# Patient Record
Sex: FEMALE | Race: WHITE | NOT HISPANIC OR LATINO | Employment: FULL TIME | URBAN - METROPOLITAN AREA
[De-identification: names, ages, dates, MRNs, and addresses within clinical notes are randomized per-mention and may not be internally consistent; named-entity substitution may affect disease eponyms.]

---

## 2017-06-05 ENCOUNTER — OFFICE VISIT (OUTPATIENT)
Dept: OBSTETRICS AND GYNECOLOGY | Facility: CLINIC | Age: 37
End: 2017-06-05
Attending: OBSTETRICS & GYNECOLOGY
Payer: COMMERCIAL

## 2017-06-05 VITALS
WEIGHT: 149.5 LBS | DIASTOLIC BLOOD PRESSURE: 70 MMHG | BODY MASS INDEX: 25.52 KG/M2 | HEIGHT: 64 IN | SYSTOLIC BLOOD PRESSURE: 98 MMHG

## 2017-06-05 DIAGNOSIS — N94.10 DYSPAREUNIA DUE TO NON-PSYCHOGENIC CAUSE IN FEMALE: Primary | ICD-10-CM

## 2017-06-05 PROCEDURE — 99999 PR PBB SHADOW E&M-EST. PATIENT-LVL II: CPT | Mod: PBBFAC,,, | Performed by: OBSTETRICS & GYNECOLOGY

## 2017-06-05 PROCEDURE — 99204 OFFICE O/P NEW MOD 45 MIN: CPT | Mod: S$GLB,,, | Performed by: OBSTETRICS & GYNECOLOGY

## 2017-06-05 RX ORDER — AMOXICILLIN AND CLAVULANATE POTASSIUM 875; 125 MG/1; MG/1
TABLET, FILM COATED ORAL
Refills: 0 | COMMUNITY
Start: 2017-05-31 | End: 2017-06-15

## 2017-06-05 RX ORDER — DEXTROAMPHETAMINE SACCHARATE, AMPHETAMINE ASPARTATE, DEXTROAMPHETAMINE SULFATE AND AMPHETAMINE SULFATE 7.5; 7.5; 7.5; 7.5 MG/1; MG/1; MG/1; MG/1
30 TABLET ORAL
COMMUNITY
Start: 2016-09-16 | End: 2018-08-30

## 2017-06-05 NOTE — PROGRESS NOTES
Subjective:       Patient ID: Cynthia Dailey is a 36 y.o. female.    Chief Complaint:  Pelvic Pain (hurts to wear tampons, sexual disfuction)      History of Present Illness  HPI  Pt is 36 y.o. with Patient's last menstrual period was 2017. here for further evaluation of her pain.  Sent by Stefanie Cobb.  [place] [facility] [place] reviewed.  Since the surgery, she has had terrible pain with intercourse.  Can't even wear a tampon without pain.  And finally, cannot achieve an orgasm.  It is easier for her to urinate sitting upright.    Pt can insert the tampon into the vagina.  But when she does, it pushes up on her bladder to where she can't urinate.    Does also have vaginal dryness.  Does not use lubricant.    Previously, was able to have an orgasm.  She has tried to mastrubate but could not achieve an orgasm.  No arousal either.    GYN & OB History  Patient's last menstrual period was 2017.   Date of Last Pap: No result found    OB History    Para Term  AB Living   0 0 0 0 0 0   SAB TAB Ectopic Multiple Live Births   0 0 0 0 0             Review of Systems  Review of Systems   Constitutional: Negative for activity change, appetite change and fatigue.   HENT: Negative.  Negative for tinnitus.    Eyes: Negative.    Respiratory: Negative for cough and shortness of breath.    Cardiovascular: Negative for chest pain and palpitations.   Gastrointestinal: Negative.  Negative for abdominal pain, blood in stool, constipation, diarrhea and nausea.   Endocrine: Negative.  Negative for hot flashes.   Genitourinary: Positive for dyspareunia. Negative for dysuria, frequency, menstrual problem, pelvic pain, vaginal discharge, dysmenorrhea, urinary incontinence and postcoital bleeding.   Musculoskeletal: Negative for back pain and joint swelling.   Skin:  Negative for rash.   Neurological: Negative.  Negative for headaches.   Hematological: Negative.  Does not bruise/bleed easily.    Psychiatric/Behavioral: The patient is not nervous/anxious.    Breast: negative.  Negative for breast mass, nipple discharge and skin changes          Objective:    Physical Exam:   Constitutional: She is oriented to person, place, and time. She appears well-developed and well-nourished. No distress.    HENT:   Head: Normocephalic and atraumatic.    Eyes: Conjunctivae and lids are normal. Pupils are equal, round, and reactive to light.    Neck: Normal range of motion and full passive range of motion without pain. Neck supple.    Cardiovascular: Normal rate and regular rhythm.  Exam reveals no edema.     Pulmonary/Chest: Effort normal and breath sounds normal. She has no wheezes.        Abdominal: Soft. Normal appearance and bowel sounds are normal. She exhibits no distension. There is no tenderness. There is no rebound and no guarding.     Genitourinary: Uterus normal. Pelvic exam was performed with patient supine. There is no tenderness or lesion on the right labia. There is no tenderness or lesion on the left labia. Uterus is not deviated, not fixed and not hosting fibroids. Cervix is normal. Right adnexum displays no mass and no tenderness. Left adnexum displays no mass and no tenderness. There is tenderness in the vagina. No rectocele, cystocele or unspecified prolapse of vaginal walls in the vagina. No vaginal discharge found. Labial bartholins normal.Cervix exhibits no motion tenderness and no discharge.   Genitourinary Comments: Tenderness of left ischial spine.  Loss of left side anal wink.  Normal rectal tone.  Vaginal shelf beyond the hymenal ring.           Musculoskeletal: Normal range of motion and moves all extremeties.       Neurological: She is alert and oriented to person, place, and time. She has normal strength.    Skin: Skin is warm and dry.    Psychiatric: She has a normal mood and affect. Her speech is normal and behavior is normal. Thought content normal. Her mood appears not anxious. She  does not exhibit a depressed mood. She expresses no suicidal plans and no homicidal plans.          Assessment:        1. Dyspareunia due to non-psychogenic cause in female                Plan:      Cynthia was seen today for pelvic pain.    Diagnoses and all orders for this visit:    Dyspareunia due to non-psychogenic cause in female  -     LIDOCAINE 2 %, VALIUM 5 MG, BACLOFEN 4 % SUPPOSITORY; Place 1 suppository vaginally 2 (two) times daily.  We had a long discussion about the source of her discomfort with intercourse.  Likely due to the shelf posteriorly (after the rectopexy).  Also has some pudenal neuralgia.  She has tried pelvic floor PT without much help.  We discussed how to use vaginal dilators to help her stretch and desensitize her pelvis.  Explained how she can engage her partner to help.  Also given some vaginal valium.  F/u 3 months for a pain re-assessment.

## 2017-06-15 ENCOUNTER — OFFICE VISIT (OUTPATIENT)
Dept: FAMILY MEDICINE | Facility: CLINIC | Age: 37
End: 2017-06-15
Payer: COMMERCIAL

## 2017-06-15 VITALS
HEIGHT: 64 IN | SYSTOLIC BLOOD PRESSURE: 98 MMHG | BODY MASS INDEX: 25.14 KG/M2 | DIASTOLIC BLOOD PRESSURE: 58 MMHG | WEIGHT: 147.25 LBS | TEMPERATURE: 98 F

## 2017-06-15 DIAGNOSIS — K59.01 CONSTIPATION BY DELAYED COLONIC TRANSIT: ICD-10-CM

## 2017-06-15 DIAGNOSIS — R10.9 ABDOMINAL CRAMPS: ICD-10-CM

## 2017-06-15 DIAGNOSIS — Q43.8 REDUNDANT COLON: Primary | ICD-10-CM

## 2017-06-15 PROCEDURE — 99999 PR PBB SHADOW E&M-EST. PATIENT-LVL III: CPT | Mod: PBBFAC,,, | Performed by: FAMILY MEDICINE

## 2017-06-15 PROCEDURE — 99214 OFFICE O/P EST MOD 30 MIN: CPT | Mod: S$GLB,,, | Performed by: FAMILY MEDICINE

## 2017-06-15 RX ORDER — BACLOFEN 10 MG/1
10 TABLET ORAL DAILY
COMMUNITY
End: 2017-09-18

## 2017-06-15 RX ORDER — DICYCLOMINE HYDROCHLORIDE 10 MG/1
10 CAPSULE ORAL 3 TIMES DAILY PRN
Qty: 40 CAPSULE | Refills: 0 | Status: SHIPPED | OUTPATIENT
Start: 2017-06-15 | End: 2017-07-15

## 2017-06-15 NOTE — PROGRESS NOTES
Subjective:       Patient ID: Cynthia Dailey is a 36 y.o. female.    Chief Complaint: Abdominal Pain (Abd pain, varies each side, x 1 wk. )    She is complaining of moderate to severe abdominal cramping pain for the past several days.  Some of the pain is in the right lower quadrant but some is in the left middle and left lower quadrant.  She has a complicated history.  She has a redundant colon and had rectal surgery for prolapsed rectum.  She feels that she is had increased problems with her bowels since the surgery.  She had severe constipation with bloating and distention.  She tried multiple laxatives including Dulcolax and MiraLAX and magnesium citrate.  There was initially not much result but then she had copious bowel movements starting last week.  She has not had hardly any bowel movement since Sunday but has had decreased appetite.  There has been no fever or weight loss.  She has also been having pelvic and vaginal pain and dyspareunia and had an evaluation 10 days ago.  She is seeing Dahiana Boudoin and for depression.  She had a recent ketamine infusion with good result.      Abdominal Pain   This is a recurrent problem. The current episode started 1 to 4 weeks ago. The onset quality is undetermined. The problem occurs constantly. The problem has been waxing and waning. The pain is located in the LLQ, RLQ, right flank and rectum. The pain is at a severity of 9/10. The pain is severe. The quality of the pain is aching, cramping, sharp and tearing. Associated symptoms include anorexia, constipation, myalgias and nausea. Pertinent negatives include no arthralgias, belching, diarrhea, dysuria, fever, flatus, frequency, headaches, hematochezia, hematuria, melena, vomiting or weight loss. The pain is aggravated by being still and certain positions. The pain is relieved by nothing. Her past medical history is significant for abdominal surgery, gallstones, GERD and pancreatitis. There is no history of colon cancer,  "Crohn's disease, irritable bowel syndrome, PUD or ulcerative colitis. Patient's medical history includes UTI. Patient's medical history does not include kidney stones.     Review of Systems   Constitutional: Positive for appetite change. Negative for fever, unexpected weight change and weight loss.   Gastrointestinal: Positive for abdominal pain, anorexia, constipation and nausea. Negative for blood in stool, diarrhea, flatus, hematochezia, melena and vomiting.   Genitourinary: Positive for pelvic pain and vaginal pain. Negative for dysuria, frequency and hematuria.   Musculoskeletal: Positive for myalgias. Negative for arthralgias.   Neurological: Negative for headaches.       Objective:     Blood pressure (!) 98/58, temperature 98.3 °F (36.8 °C), temperature source Oral, height 5' 4" (1.626 m), weight 66.8 kg (147 lb 4.3 oz), last menstrual period 05/30/2017.      Physical Exam   Constitutional: She appears well-developed and well-nourished. She appears distressed.   She is unable to sit up straight because of her abdominal pain   Cardiovascular: Normal rate and regular rhythm.    Pulmonary/Chest: Effort normal and breath sounds normal. No respiratory distress.   Abdominal: Soft.   Active bowel sounds.  She has some epigastric tenderness but mostly right lower quadrant and left lower quadrant tenderness.  There is no distention or mass.  Hollow sounds to percussion.  No CVA tenderness   Neurological: She is alert.   Psychiatric:   Tearful at times       Assessment:       1. Redundant colon    2. Constipation by delayed colonic transit    3. Abdominal cramps        Plan:       Abdominal CT scan without contrast.  She has a history of anaphylaxis due to IV contrast and there is some mention of a problem with oral contrast.  Bentyl 10 mg 3 times a day when necessary abdominal cramps.  Colon and rectal consult with Dr. Hernandes.  Call me back if she is having trouble controlling her symptoms.     "

## 2017-06-20 ENCOUNTER — HOSPITAL ENCOUNTER (OUTPATIENT)
Dept: RADIOLOGY | Facility: HOSPITAL | Age: 37
Discharge: HOME | End: 2017-06-20
Attending: FAMILY MEDICINE
Payer: COMMERCIAL

## 2017-06-20 ENCOUNTER — TELEPHONE (OUTPATIENT)
Dept: FAMILY MEDICINE | Facility: CLINIC | Age: 37
End: 2017-06-20

## 2017-06-20 DIAGNOSIS — Q43.8 REDUNDANT COLON: ICD-10-CM

## 2017-06-20 PROCEDURE — 74176 CT ABD & PELVIS W/O CONTRAST: CPT | Mod: TC,PO

## 2017-06-20 PROCEDURE — 74176 CT ABD & PELVIS W/O CONTRAST: CPT | Mod: 26,,, | Performed by: RADIOLOGY

## 2017-06-20 NOTE — TELEPHONE ENCOUNTER
----- Message from Drake Phelps sent at 6/20/2017  2:41 PM CDT -----  Contact: self   Placed call to pod, patient want to speak with a nurse regarding test results please call back at [phone]

## 2017-06-20 NOTE — TELEPHONE ENCOUNTER
Pt read results on [facility] and called to schedule an appointment to discuss further. Pt appt scheduled

## 2017-06-21 ENCOUNTER — OFFICE VISIT (OUTPATIENT)
Dept: FAMILY MEDICINE | Facility: CLINIC | Age: 37
End: 2017-06-21
Payer: COMMERCIAL

## 2017-06-21 VITALS
DIASTOLIC BLOOD PRESSURE: 75 MMHG | OXYGEN SATURATION: 99 % | BODY MASS INDEX: 25.2 KG/M2 | SYSTOLIC BLOOD PRESSURE: 118 MMHG | TEMPERATURE: 98 F | HEIGHT: 64 IN | WEIGHT: 147.63 LBS | HEART RATE: 57 BPM

## 2017-06-21 DIAGNOSIS — M54.50 RIGHT-SIDED LOW BACK PAIN WITHOUT SCIATICA, UNSPECIFIED CHRONICITY: ICD-10-CM

## 2017-06-21 DIAGNOSIS — M87.052 AVASCULAR NECROSIS OF BONES OF BOTH HIPS: ICD-10-CM

## 2017-06-21 DIAGNOSIS — K59.00 CONSTIPATION, UNSPECIFIED CONSTIPATION TYPE: Primary | ICD-10-CM

## 2017-06-21 DIAGNOSIS — M87.051 AVASCULAR NECROSIS OF BONES OF BOTH HIPS: ICD-10-CM

## 2017-06-21 PROCEDURE — 99999 PR PBB SHADOW E&M-EST. PATIENT-LVL III: CPT | Mod: PBBFAC,,, | Performed by: FAMILY MEDICINE

## 2017-06-21 PROCEDURE — 99214 OFFICE O/P EST MOD 30 MIN: CPT | Mod: S$GLB,,, | Performed by: FAMILY MEDICINE

## 2017-06-21 RX ORDER — NAPROXEN 500 MG/1
500 TABLET ORAL 2 TIMES DAILY PRN
Qty: 40 TABLET | Refills: 2 | Status: SHIPPED | OUTPATIENT
Start: 2017-06-21 | End: 2017-07-21

## 2017-06-21 RX ORDER — POLYETHYLENE GLYCOL 3350 17 G/17G
25 POWDER, FOR SOLUTION ORAL DAILY
Qty: 1530 G | Refills: 5 | Status: SHIPPED | OUTPATIENT
Start: 2017-06-21 | End: 2018-08-30

## 2017-06-21 NOTE — PROGRESS NOTES
"Subjective:       Patient ID: Cynthia Dailey is a 36 y.o. female.    Chief Complaint: CT scan Results    She is here for follow-up of severe constipation from a redundant colon.  She was having significant abdominal cramps last week.  They have improved with the Bentyl.  She is having a bowel movement every day to every other day.  They are formed.  Her abdominal CT scan does show persistent large amount of stool as of yesterday.  She also complains of right iliac crest pain with right sacral pain and some left lower quadrant pain.  The CT scan did show bilateral avascular necrosis of both femoral necks.  She relates previous marathon running and had to stop because of hip pain.  She does relate the back and pelvic pain worse with certain positions and movements.      Review of Systems   Constitutional: Negative for fever and unexpected weight change.   Respiratory: Negative for shortness of breath and stridor.    Gastrointestinal: Positive for abdominal pain and constipation. Negative for diarrhea and nausea.       Objective:     Blood pressure 118/75, pulse (!) 57, temperature 97.9 °F (36.6 °C), temperature source Oral, height 5' 4" (1.626 m), weight 67 kg (147 lb 9.6 oz), last menstrual period 05/30/2017, SpO2 99 %.      Physical Exam   Constitutional: She appears well-developed and well-nourished. No distress (significantly more comfortable than last week.).   Pulmonary/Chest: Effort normal and breath sounds normal.   Abdominal: Soft. Bowel sounds are normal. She exhibits no distension and no mass. There is no tenderness.   Musculoskeletal:   She can flex her lumbar spine to 100°.  Mild pain.  More pain with left side bend.  She does have mild lumbosacral tenderness.  Hip range of motion is very good but she does have pain with full abduction.  There is also some pain with internal rotation.  She is tender over the right iliac crest.   Neurological: She is alert.   Psychiatric: She has a normal mood and affect.    "    Assessment:       1. Constipation, unspecified constipation type    2. Avascular necrosis of bones of both hips    3. Right-sided low back pain without sciatica, unspecified chronicity        Plan:       Naprosyn for musculoskeletal pain.  Increase MiraLAX to 1.5 Daily.  She does have a consultation appointment with Dr. Hernandes.  I will also have her consult with orthopedics.

## 2017-06-26 ENCOUNTER — TELEPHONE (OUTPATIENT)
Dept: ORTHOPEDICS | Facility: CLINIC | Age: 37
End: 2017-06-26

## 2017-06-26 NOTE — TELEPHONE ENCOUNTER
----- Message from Pritesh Cuadra MD sent at 6/25/2017  8:22 PM CDT -----  This patient needs to see Dr. Thompson. Not me.

## 2017-06-27 DIAGNOSIS — M25.551 BILATERAL HIP PAIN: Primary | ICD-10-CM

## 2017-06-27 DIAGNOSIS — M25.552 BILATERAL HIP PAIN: Primary | ICD-10-CM

## 2017-06-27 RX ORDER — OMEPRAZOLE 40 MG/1
CAPSULE, DELAYED RELEASE ORAL
Qty: 90 CAPSULE | Refills: 3 | Status: SHIPPED | OUTPATIENT
Start: 2017-06-27 | End: 2018-02-22 | Stop reason: SDUPTHER

## 2017-06-29 ENCOUNTER — OFFICE VISIT (OUTPATIENT)
Dept: ORTHOPEDICS | Facility: CLINIC | Age: 37
End: 2017-06-29
Payer: COMMERCIAL

## 2017-06-29 ENCOUNTER — HOSPITAL ENCOUNTER (OUTPATIENT)
Dept: RADIOLOGY | Facility: HOSPITAL | Age: 37
Discharge: HOME | End: 2017-06-29
Attending: ORTHOPAEDIC SURGERY
Payer: COMMERCIAL

## 2017-06-29 VITALS — BODY MASS INDEX: 25.1 KG/M2 | HEIGHT: 64 IN | WEIGHT: 147 LBS

## 2017-06-29 DIAGNOSIS — M25.552 BILATERAL HIP PAIN: ICD-10-CM

## 2017-06-29 DIAGNOSIS — M25.551 BILATERAL HIP PAIN: ICD-10-CM

## 2017-06-29 DIAGNOSIS — M87.052 AVASCULAR NECROSIS OF BONES OF BOTH HIPS: Primary | ICD-10-CM

## 2017-06-29 DIAGNOSIS — M87.051 AVASCULAR NECROSIS OF BONES OF BOTH HIPS: Primary | ICD-10-CM

## 2017-06-29 PROCEDURE — 99999 PR PBB SHADOW E&M-EST. PATIENT-LVL II: CPT | Mod: PBBFAC,,, | Performed by: ORTHOPAEDIC SURGERY

## 2017-06-29 PROCEDURE — 99243 OFF/OP CNSLTJ NEW/EST LOW 30: CPT | Mod: S$GLB,,, | Performed by: ORTHOPAEDIC SURGERY

## 2017-06-29 NOTE — LETTER
June 29, 2017      Praveen Saldana MD  [address]  [facility] [address]           [facility] [address] [facility] [address] [place] [address]  Phone: [phone]          Patient: Cynthia Dailey   MR Number: [id number]   YOB: 1980   Date of Visit: 6/29/2017       Dear Dr. Praveen Saldana:    Thank you for referring Cynthia Dailey to me for evaluation. Attached you will find relevant portions of my assessment and plan of care.    If you have questions, please do not hesitate to call me. I look forward to following Cynthia Dailey along with you.    Sincerely,    Ean Thompson MD    Enclosure  CC:  No Recipients    If you would like to receive this communication electronically, please contact [email or web address] or [phone] to request more information on [facility] access.    For providers and/or their staff who would like to refer a patient to [facility], please contact us through our one-stop-shop provider referral line, [facility] , at [phone].    If you feel you have received this communication in error or would no longer like to receive these types of communications, please e-mail [email or web address]

## 2017-06-29 NOTE — PROGRESS NOTES
This is a 36-year-old female seen as a consult from Dr. Kramer complaining of back and hip pain she's had now for years.  She recently had a CAT scan which showed avascular necrosis of bilateral femoral heads.  She also has back pain as well as: Pain she has a long-standing history of problems with: Does have surgery in the past.  She is very upset today and crying in pain.  She wants some formal relief.*    Exam shows that she is tender about the hips no signs of infection internal and extraocular joint hips reproduces her symptoms.*    CT scan shows avascular necrosis    Assessment AVN bilateral femoral heads*    Plan MRI bilateral hips follow-up to discuss the possibility of core decompression    Further History  Aching pain  Worse with activity  Relieved with rest  No other associated symptoms  No other radiation    Further Exam  Alert and oriented  Pleasant  Contralateral limb has appropriate range of motion for age and condition  Contralateral limb has appropriate strength for age and condition  Contralateral limb has appropriate stability  for age and condition  No adenopathy  Pulses are appropriate for current condition  Skin is intact        Chief Complaint    Chief Complaint   Patient presents with    Right Hip - Pain    Left Hip - Pain    Hip Pain     bialt hip        HPI  Cynthia Dailey is a 36 y.o.  female who presents with       Past Medical History  Past Medical History:   Diagnosis Date    ADD (attention deficit disorder)     Anxiety     Asthma     Corns and callosities     Depression     GERD (gastroesophageal reflux disease)     Rectal prolapse     Wears glasses        Past Surgical History  Past Surgical History:   Procedure Laterality Date    CHOLECYSTECTOMY      COLON SURGERY  Jan 2015    Rectal Prolaps    MANDIBLE FRACTURE SURGERY         Medications  Current Outpatient Prescriptions   Medication Sig    albuterol (PROVENTIL) 2.5 mg /3 mL (0.083 %) nebulizer solution Take 3 mLs (2.5  mg total) by nebulization every 4 (four) hours as needed for Wheezing.    baclofen (LIORESAL) 10 MG tablet Take 10 mg by mouth once daily.    dextroamphetamine-amphetamine (ADDERALL) 30 mg Tab Take 30 mg by mouth 5 (five) times daily.     dicyclomine (BENTYL) 10 MG capsule Take 1 capsule (10 mg total) by mouth 3 (three) times daily as needed (abdominal cramps).    LIDOCAINE 2 %, VALIUM 5 MG, BACLOFEN 4 % SUPPOSITORY Place 1 suppository vaginally 2 (two) times daily.    naproxen (NAPROSYN) 500 MG tablet Take 1 tablet (500 mg total) by mouth 2 (two) times daily as needed.    omeprazole (PRILOSEC) 40 MG capsule Take 1 capsule by mouth  once a day    polyethylene glycol (GLYCOLAX) 17 gram/dose powder Take 25 g by mouth once daily.    QVAR 40 mcg/actuation Aero Inhale 2 puffs into the  lungs once daily    sodium chloride 0.9% 0.9 % SolP 50 mL with ketamine 100 mg/mL Soln 10 mg/mL Inject 2,267 mcg/kg/min into the vein every 30 days.    triamcinolone acetonide 0.1% (KENALOG) 0.1 % cream Apply topically 2 (two) times daily.    vitamin D 1000 units Tab Take 1,000 Units by mouth once daily.      No current facility-administered medications for this visit.        Allergies  Review of patient's allergies indicates:   Allergen Reactions    Iodinated contrast media - oral and iv dye Anaphylaxis    Ciprofloxacin Rash       Family History  Family History   Problem Relation Age of Onset    Cancer Maternal Grandmother     Cancer Paternal Grandmother     Clotting disorder Neg Hx     Diabetes Neg Hx     Dislocations Neg Hx     Osteoporosis Neg Hx     Psoriasis Neg Hx     Rashes / Skin problems Neg Hx     Severe sprains Neg Hx     Ulcerative colitis Neg Hx        Social History  Social History     Social History    Marital status: Single     Spouse name: N/A    Number of children: N/A    Years of education: N/A     Occupational History    Not on file.     Social History Main Topics    Smoking status: Former  Smoker     Packs/day: 0.75     Years: 8.00     Types: Cigarettes     Quit date: 11/20/2006    Smokeless tobacco: Never Used    Alcohol use 4.2 oz/week     7 Glasses of wine per week    Drug use:      Types: Benzodiazepines, Marijuana    Sexual activity: Yes     Birth control/ protection: Condom     Other Topics Concern    Not on file     Social History Narrative    No narrative on file               Review of Systems     Constitutional: Negative    HENT: Negative  Eyes: Negative  Respiratory: Negative  Cardiovascular: Negative  Musculoskeletal: HPI  Skin: Negative  Neurological: Negative  Hematological: Negative  Endocrine: Negative                 Physical Exam    There were no vitals filed for this visit.  Body mass index is 25.23 kg/m².  Physical Examination:     General appearance -  well appearing, and in no distress  Mental status - awake  Neck - supple  Chest -  symmetric air entry  Heart - normal rate   Abdomen - soft      Assessment     1. Avascular necrosis of bones of both hips    2. Bilateral hip pain          Plan

## 2017-07-12 ENCOUNTER — OFFICE VISIT (OUTPATIENT)
Dept: SURGERY | Facility: CLINIC | Age: 37
End: 2017-07-12
Payer: COMMERCIAL

## 2017-07-12 VITALS
HEART RATE: 76 BPM | HEIGHT: 64 IN | SYSTOLIC BLOOD PRESSURE: 140 MMHG | DIASTOLIC BLOOD PRESSURE: 76 MMHG | WEIGHT: 151.25 LBS | BODY MASS INDEX: 25.82 KG/M2

## 2017-07-12 DIAGNOSIS — K59.00 CONSTIPATION, UNSPECIFIED CONSTIPATION TYPE: ICD-10-CM

## 2017-07-12 DIAGNOSIS — R10.9 ABDOMINAL PAIN, UNSPECIFIED LOCATION: Primary | ICD-10-CM

## 2017-07-12 DIAGNOSIS — K59.04 CHRONIC IDIOPATHIC CONSTIPATION: Primary | ICD-10-CM

## 2017-07-12 PROCEDURE — 99214 OFFICE O/P EST MOD 30 MIN: CPT | Mod: S$GLB,,, | Performed by: COLON & RECTAL SURGERY

## 2017-07-12 PROCEDURE — 99999 PR PBB SHADOW E&M-EST. PATIENT-LVL II: CPT | Mod: PBBFAC,,, | Performed by: COLON & RECTAL SURGERY

## 2017-07-12 NOTE — PROGRESS NOTES
"Chronic constipation and abdominal pain since rectopexy.  Reports unable to take linzess (non sweating)or colace (vegan).    Nerve injury per pt - unable to orgasm since surgery.  BM q 9 days if no miralax.  Takes miralax 4 x week to have BM.     Able to urinate.    No recurrence of prolapse.      Past Medical History:   Diagnosis Date    ADD (attention deficit disorder)     Anxiety     Asthma     Corns and callosities     Depression     GERD (gastroesophageal reflux disease)     Rectal prolapse     Wears glasses      Past Surgical History:   Procedure Laterality Date    CHOLECYSTECTOMY      COLON SURGERY  Jan 2015    Rectal Prolaps    MANDIBLE FRACTURE SURGERY       Review of patient's allergies indicates:   Allergen Reactions    Iodinated contrast- oral and iv dye Anaphylaxis    Ciprofloxacin Rash     Family History   Problem Relation Age of Onset    Cancer Maternal Grandmother     Cancer Paternal Grandmother     Clotting disorder Neg Hx     Diabetes Neg Hx     Dislocations Neg Hx     Osteoporosis Neg Hx     Psoriasis Neg Hx     Rashes / Skin problems Neg Hx     Severe sprains Neg Hx     Ulcerative colitis Neg Hx      Social History     Social History    Marital status: Single     Spouse name: N/A    Number of children: N/A    Years of education: N/A     Occupational History    Not on file.     Social History Main Topics    Smoking status: Former Smoker     Packs/day: 0.75     Years: 8.00     Types: Cigarettes     Quit date: 11/20/2006    Smokeless tobacco: Never Used    Alcohol use 4.2 oz/week     7 Glasses of wine per week    Drug use:      Types: Benzodiazepines, Marijuana    Sexual activity: Yes     Birth control/ protection: Condom     Other Topics Concern    Not on file     Social History Narrative    No narrative on file       Young female in NAD  BP (!) 140/76   Pulse 76   Ht 5' 4.02" (1.626 m)   Wt 68.6 kg (151 lb 3.8 oz)   BMI 25.95 kg/m²   Skin anicteric  AT NC " EOMI  Neck supple  Abdomen soft NT ND.  No masses.  No organomegaly  Rectal   Nl appearing anus  Anal wink elicited on both sides of anus  XIANG nl tone, good squeeze and nl relaxation of pelvic floor with Valsalva    Assessment  1.  Rectal prolapse - no recurrence since rectopexy.    2.  Chronic constipation since rectopexy   3.  severe abdominal pain since rectopexy    Recommend  Barium enema to assess rectopexy, rectosigmoid junction  Referral to Dr Cortes for constipation  Increase fluid intake, high fiber diet.    OV 4 weeks

## 2017-07-19 ENCOUNTER — TELEPHONE (OUTPATIENT)
Dept: GASTROENTEROLOGY | Facility: CLINIC | Age: 37
End: 2017-07-19

## 2017-07-19 NOTE — TELEPHONE ENCOUNTER
Attempted to contact patient without success.    Patient was referred by Dr. Hernandes to see Dr. Cortes, This call was regarding scheduling a new patient visit.    Unable to leave a voicemail, voicemail was full.

## 2017-07-26 ENCOUNTER — TELEPHONE (OUTPATIENT)
Dept: GASTROENTEROLOGY | Facility: CLINIC | Age: 37
End: 2017-07-26

## 2017-07-26 NOTE — TELEPHONE ENCOUNTER
Attempted to contact patient without success x2.    This call is regarding to schedule a new patient visit. Will message Dr. Hernandes and let him know, We have reached out and no response.    Left message for patient to return call to the clinic.

## 2017-07-27 ENCOUNTER — PATIENT MESSAGE (OUTPATIENT)
Dept: GASTROENTEROLOGY | Facility: CLINIC | Age: 37
End: 2017-07-27

## 2017-07-27 ENCOUNTER — TELEPHONE (OUTPATIENT)
Dept: GASTROENTEROLOGY | Facility: CLINIC | Age: 37
End: 2017-07-27

## 2017-07-27 NOTE — TELEPHONE ENCOUNTER
Spoke with patient about becoming a new patient.    Patient is scheduled for September and will be placed on the wait list. Patient will be called in if any cancellations.     Appt Slipped mailed.

## 2017-08-09 ENCOUNTER — TELEPHONE (OUTPATIENT)
Dept: GASTROENTEROLOGY | Facility: CLINIC | Age: 37
End: 2017-08-09

## 2017-08-09 ENCOUNTER — OFFICE VISIT (OUTPATIENT)
Dept: SURGERY | Facility: CLINIC | Age: 37
End: 2017-08-09
Payer: COMMERCIAL

## 2017-08-09 VITALS
BODY MASS INDEX: 25.56 KG/M2 | DIASTOLIC BLOOD PRESSURE: 76 MMHG | HEIGHT: 64 IN | WEIGHT: 149.69 LBS | SYSTOLIC BLOOD PRESSURE: 110 MMHG | HEART RATE: 75 BPM

## 2017-08-09 DIAGNOSIS — K59.04 CHRONIC IDIOPATHIC CONSTIPATION: ICD-10-CM

## 2017-08-09 DIAGNOSIS — K59.02 CONSTIPATION DUE TO OUTLET DYSFUNCTION: Primary | ICD-10-CM

## 2017-08-09 DIAGNOSIS — K59.00 CONSTIPATION, UNSPECIFIED CONSTIPATION TYPE: Primary | ICD-10-CM

## 2017-08-09 PROCEDURE — 3008F BODY MASS INDEX DOCD: CPT | Mod: S$GLB,,, | Performed by: COLON & RECTAL SURGERY

## 2017-08-09 PROCEDURE — 99213 OFFICE O/P EST LOW 20 MIN: CPT | Mod: S$GLB,,, | Performed by: COLON & RECTAL SURGERY

## 2017-08-09 PROCEDURE — 99999 PR PBB SHADOW E&M-EST. PATIENT-LVL III: CPT | Mod: PBBFAC,,, | Performed by: COLON & RECTAL SURGERY

## 2017-08-09 NOTE — PATIENT INSTRUCTIONS
Clear liquid diet for 3 days  MiraLAX 4-6 glasses per day ×3 days  When effluent is clear, start full liquid diet and MiraLAX 2 glasses per day  Emilys study  CT scan abdomen and pelvis with non-iodine based rectal contrast

## 2017-08-09 NOTE — TELEPHONE ENCOUNTER
Spoke with patient, patient had to reschedule appt on 9/26 due to school.    Patient rescheduled for 9/18 appt slip mailed.

## 2017-08-09 NOTE — TELEPHONE ENCOUNTER
----- Message from Peg Olivera sent at 8/9/2017 10:04 AM CDT -----  Contact: self - [phone]  nicole giraldos appt for gi dysmotility - please call patient at

## 2017-08-09 NOTE — Clinical Note
Julius Gimenez.  Cynthia continues to suffer from abd pain and severe constipation.  I have ordered a repeat CT scan with rectal contrast as well as a colonic transit study.  Also I recommended that we perform a bowel cleansing prep and get her back to empty so to speak and then try twice daily Miralax.  I will keep you in the loop.  Thanks Ray

## 2017-08-09 NOTE — PROGRESS NOTES
Patient is seen in follow-up of her constipation following rectopexy.  She was scheduled for CT scan of abdomen and pelvis with rectal contrast but this was not performed.  Patient reports that her MiraLAX has helped with having bowel movements but that she has suffered severe cramps after taking it.  No vomiting.    A long discussion with the patient.  I have recommended that we repeat a sits marker test read in addition I have recommended CT scan with rectal contrast and I am concerned that there may be transition point at the rectopexy site    In regards to her symptomatology I have recommended that we place her on a clear liquid diet for 3 days and that we do a slow bowel prep using MiraLAX 4-6 glasses per day to get cleaned out.  With then recommend a full liquid diet and MiraLAX 2 doses per day area this bowel prep will hopefully reduce the column of stool in the pain that the patient experiences    She will return to the office in 2-3 weeks after her CT scan and sits marker study are completed.

## 2017-08-11 ENCOUNTER — PATIENT MESSAGE (OUTPATIENT)
Dept: SURGERY | Facility: CLINIC | Age: 37
End: 2017-08-11

## 2017-08-14 ENCOUNTER — TELEPHONE (OUTPATIENT)
Dept: SURGERY | Facility: CLINIC | Age: 37
End: 2017-08-14

## 2017-08-14 DIAGNOSIS — K59.00 CONSTIPATION, UNSPECIFIED CONSTIPATION TYPE: Primary | ICD-10-CM

## 2017-08-14 NOTE — TELEPHONE ENCOUNTER
Pt cancelled the ct. She says it was scheduled incorrectly. Told pt I spoke to the tech last week about using the contrast without iodine. She says they knew nothing about that. Tried to reschedule but that time was gone. She says she starts school Wed. And she has to start eating. She is unable to concentrate. She says the next time she can schedule anything is in Dec. Told her to call me if something comes up and she has time to schedule.

## 2017-08-29 ENCOUNTER — TELEPHONE (OUTPATIENT)
Dept: SURGERY | Facility: CLINIC | Age: 37
End: 2017-08-29

## 2017-08-29 NOTE — TELEPHONE ENCOUNTER
----- Message from PRISCA Hernandes MD sent at 8/26/2017  2:18 PM CDT -----  Peg please call pt and let me know how she is doing and what her status is with her CT scan and sitz marker study.  Thanks  D

## 2017-09-18 ENCOUNTER — TELEPHONE (OUTPATIENT)
Dept: ENDOSCOPY | Facility: HOSPITAL | Age: 37
End: 2017-09-18

## 2017-09-18 ENCOUNTER — LAB VISIT (OUTPATIENT)
Dept: LAB | Facility: HOSPITAL | Age: 37
End: 2017-09-18
Attending: INTERNAL MEDICINE
Payer: COMMERCIAL

## 2017-09-18 ENCOUNTER — OFFICE VISIT (OUTPATIENT)
Dept: GASTROENTEROLOGY | Facility: CLINIC | Age: 37
End: 2017-09-18
Payer: COMMERCIAL

## 2017-09-18 VITALS
HEART RATE: 66 BPM | SYSTOLIC BLOOD PRESSURE: 107 MMHG | HEIGHT: 64 IN | BODY MASS INDEX: 26.12 KG/M2 | WEIGHT: 153 LBS | DIASTOLIC BLOOD PRESSURE: 74 MMHG

## 2017-09-18 DIAGNOSIS — F41.9 ANXIETY: ICD-10-CM

## 2017-09-18 DIAGNOSIS — R14.0 BLOATING: ICD-10-CM

## 2017-09-18 DIAGNOSIS — R10.9 ABDOMINAL PAIN, UNSPECIFIED LOCATION: ICD-10-CM

## 2017-09-18 DIAGNOSIS — K59.00 CONSTIPATION, UNSPECIFIED CONSTIPATION TYPE: ICD-10-CM

## 2017-09-18 DIAGNOSIS — R10.9 ABDOMINAL PAIN, UNSPECIFIED LOCATION: Primary | ICD-10-CM

## 2017-09-18 DIAGNOSIS — Z12.11 SPECIAL SCREENING FOR MALIGNANT NEOPLASMS, COLON: Primary | ICD-10-CM

## 2017-09-18 DIAGNOSIS — F32.A DEPRESSION, UNSPECIFIED DEPRESSION TYPE: ICD-10-CM

## 2017-09-18 DIAGNOSIS — R68.81 EARLY SATIETY: ICD-10-CM

## 2017-09-18 LAB
ALBUMIN SERPL BCP-MCNC: 4.2 G/DL
ALP SERPL-CCNC: 80 U/L
ALT SERPL W/O P-5'-P-CCNC: 11 U/L
ANION GAP SERPL CALC-SCNC: 14 MMOL/L
AST SERPL-CCNC: 16 U/L
BASOPHILS # BLD AUTO: 0.06 K/UL
BASOPHILS NFR BLD: 0.8 %
BILIRUB SERPL-MCNC: 0.7 MG/DL
BUN SERPL-MCNC: 10 MG/DL
CALCIUM SERPL-MCNC: 9.4 MG/DL
CHLORIDE SERPL-SCNC: 105 MMOL/L
CO2 SERPL-SCNC: 22 MMOL/L
CREAT SERPL-MCNC: 0.8 MG/DL
DIFFERENTIAL METHOD: ABNORMAL
EOSINOPHIL # BLD AUTO: 0.1 K/UL
EOSINOPHIL NFR BLD: 1.4 %
ERYTHROCYTE [DISTWIDTH] IN BLOOD BY AUTOMATED COUNT: 12.8 %
EST. GFR  (AFRICAN AMERICAN): >60 ML/MIN/1.73 M^2
EST. GFR  (NON AFRICAN AMERICAN): >60 ML/MIN/1.73 M^2
GLUCOSE SERPL-MCNC: 80 MG/DL
HCT VFR BLD AUTO: 42.7 %
HGB BLD-MCNC: 14.6 G/DL
IGA SERPL-MCNC: 196 MG/DL
LYMPHOCYTES # BLD AUTO: 3 K/UL
LYMPHOCYTES NFR BLD: 42.4 %
MCH RBC QN AUTO: 32.3 PG
MCHC RBC AUTO-ENTMCNC: 34.2 G/DL
MCV RBC AUTO: 95 FL
MONOCYTES # BLD AUTO: 0.4 K/UL
MONOCYTES NFR BLD: 5 %
NEUTROPHILS # BLD AUTO: 3.6 K/UL
NEUTROPHILS NFR BLD: 50.4 %
PLATELET # BLD AUTO: 239 K/UL
PMV BLD AUTO: 10.6 FL
POTASSIUM SERPL-SCNC: 3.9 MMOL/L
PROT SERPL-MCNC: 7.5 G/DL
RBC # BLD AUTO: 4.52 M/UL
SODIUM SERPL-SCNC: 141 MMOL/L
T4 FREE SERPL-MCNC: 0.95 NG/DL
TSH SERPL DL<=0.005 MIU/L-ACNC: 2.67 UIU/ML
WBC # BLD AUTO: 7.07 K/UL

## 2017-09-18 PROCEDURE — 82784 ASSAY IGA/IGD/IGG/IGM EACH: CPT

## 2017-09-18 PROCEDURE — 80053 COMPREHEN METABOLIC PANEL: CPT

## 2017-09-18 PROCEDURE — 84439 ASSAY OF FREE THYROXINE: CPT

## 2017-09-18 PROCEDURE — 85025 COMPLETE CBC W/AUTO DIFF WBC: CPT

## 2017-09-18 PROCEDURE — 99244 OFF/OP CNSLTJ NEW/EST MOD 40: CPT | Mod: S$GLB,,, | Performed by: INTERNAL MEDICINE

## 2017-09-18 PROCEDURE — 99999 PR PBB SHADOW E&M-EST. PATIENT-LVL III: CPT | Mod: PBBFAC,,, | Performed by: INTERNAL MEDICINE

## 2017-09-18 PROCEDURE — 84443 ASSAY THYROID STIM HORMONE: CPT

## 2017-09-18 PROCEDURE — 83516 IMMUNOASSAY NONANTIBODY: CPT

## 2017-09-18 PROCEDURE — 36415 COLL VENOUS BLD VENIPUNCTURE: CPT

## 2017-09-18 RX ORDER — LORAZEPAM 1 MG/1
TABLET ORAL EVERY 6 HOURS PRN
COMMUNITY
End: 2020-12-07

## 2017-09-18 RX ORDER — POLYETHYLENE GLYCOL 3350, SODIUM SULFATE ANHYDROUS, SODIUM BICARBONATE, SODIUM CHLORIDE, POTASSIUM CHLORIDE 236; 22.74; 6.74; 5.86; 2.97 G/4L; G/4L; G/4L; G/4L; G/4L
4 POWDER, FOR SOLUTION ORAL ONCE
Qty: 4000 ML | Refills: 0 | Status: SHIPPED | OUTPATIENT
Start: 2017-09-18 | End: 2017-09-18

## 2017-09-18 NOTE — LETTER
September 21, 2017      PRISCA Hernandes MD  [address] [facility] [address]           [facility]  [address]  [facility] [address]  Phone: [phone]  Fax: [phone]          Patient: Cynthia Dailey   MR Number: [id number]   YOB: 1980   Date of Visit: 9/18/2017       Dear Dr. PRISCA Hernandes:    Thank you for referring Cynthia Dailey to me for evaluation. Attached you will find relevant portions of my assessment and plan of care.    If you have questions, please do not hesitate to call me. I look forward to following Cynthia Dailey along with you.    Sincerely,        Enclosure  CC:  No Recipients    If you would like to receive this communication electronically, please contact [email or web address] or [phone] to request more information on [employer] Link access.    For providers and/or their staff who would like to refer a patient to [facility], please contact us through our one-stop-shop provider referral line, Angie Kaminski, at [phone].    If you feel you have received this communication in error or would no longer like to receive these types of communications, please e-mail [email or web address]

## 2017-09-20 LAB — TTG IGA SER IA-ACNC: 4 UNITS

## 2017-09-22 NOTE — PROGRESS NOTES
[facility] Consultation Note    Reason for Consult:    Chief Complaint   Patient presents with    Heartburn    Abdominal Pain    Gas    Bloated    Constipation    Rectal Bleeding         PCP:   Praveen Saldana   [address] [facility] [address]    Referring MD:  PRISCA Hernandes Md  [address]  [place] [address]      HPI:  Cynthia Dailey is a 37 y.o. female here for evaluation of the following GI problems:    GERD.  Patient reports heartburn and regurgitation of acidic liquids.  Controlled prilosec 40mg daily.  Symtoms started in 2008.      Nausea. Occasional nausea, no emesis.      Early satiety.  Feels full after eating small meals or prior to eating.       Abdominal pain.  Reports dull and sharp abdominal discomfort.  Located all over the abdomen.    Started 2015.   Occurs almost daily.  In almost constant pain.    Last hours   Worse with fiber, stimulant laxatives.    Improves with BMs.   Nocturnal pain: yes  Some improvement with Bentyl.    Never tired  Levsin, Levbid, IBgurd.   Using narcotic pain medication: no      Gas and bloating. Patient reports bothersome gas and bloating with abdominal distension.  Avoids milk product.  Avoids artificial sugars.      Constipation.  Patient reports lumpy and hard, difficult to pass stools.  Frequent straining during defecation.  Sensation of anorectal blockage.  Never feels like has to go to the bathroom. Has BM 1-2 times per day with miralax. Takes about 1.5 miralax daily.   Symptoms started 1/2015 after rectopexy for rectal prolapse.  Previously had daily BMs.  No BM for a 1.5 when stopped miralax.    Had severe constipation due to pain medication about 10 years ago.  Symptoms resolved.  No problems with constiapation prior to surgery. Rectal prolapse noted about 8 months prior to surgery.     Unable to take Linzess due to inability to sweat  Never tried amitiza in the past   Rectal maneuvers to facilitate  defecation:no    Problems in early childhood: no  History of vaginal delivery: no      Seen by ASHLEY Monte for 6 months without improvement.      Assessment:      Feel that pt has obtained maximal benefit from PT services at this time.  Her issues seem to be stemming from poor distal colon motility and not with outlet dysfunction- she reports no difficulty passing stool once she has the urge to go.                BRBPR. Reports occasional blood on tissues paper.       Denies dysphagia, vomiting, diarrhea, melena, weight loss.       Anxiety, depression, ADD. Taking ketamine infusions for depression.  Tried various antidepressants in the past.  Ativan for anxiety. Followed by a psychiatrist in [place] every three months. Did not like to take antidepressants due to side effects.  Previously followed by psychologist.  Works full time, thirs year at [employer]. Gi problems are causing a lot of anxiety.  Feels that surgery ruined her life.     Unable to orgasm since surgery. Followed by Dr. Patel, who specialized in pelvic floor dysfunction.       Denies dysphagia, GERD, nausea, vomiting, early satiety, abdominal pain, bloating, diarrhea, constipation, BRBPR, melena, weight loss.       Previous Studies:   Colon 11/2014: Redundant colon. Hemorrhoids.   Ct 6/20/17: Lg amt of stool in colon. Sm presacral fluid collection unchanged since 11/2015.  L ovarian cyst.  Avasc necrosis of femur b/l.    Defec 1/7/2016: Minimal contraction of distal colon. Evacuation w repeat straining. + dyssynergia.   Celiac labs - 2009  HA/B/C negative    ROS:  ROS   Constitutional: No fevers, no chills, no night sweats, no weight loss  ENT: + congestion, + rhinorrhea, + chronic sinus problems  CV: No chest pain, no palpitations  Pulm: No cough, no shortness of breath  Ophtho: No blurry vision, no eye redness  GI: see HPI  Derm: No rash  Heme: No lymphadenopathy, + bruising  MSK: No arthritis, no joint swelling, + Raynauds  : No  dysuria, no frequent urination, no blood in urine  Endo: No hot or cold intolerance  Neuro: No dizziness, no syncope, no seizure  Psych: + anxiety, + depression    Medical History:   Past Medical History:   Diagnosis Date    ADD (attention deficit disorder)     Anxiety     Asthma     Corns and callosities     Depression     Dislocated jaw     GERD (gastroesophageal reflux disease)     Rectal prolapse     Wears glasses         Surgical History:   Past Surgical History:   Procedure Laterality Date    CHOLECYSTECTOMY      COLON SURGERY  Jan 2015    Rectal Prolaps    MANDIBLE FRACTURE SURGERY      RECTAL PROLAPSE REPAIR, RECTOPEXY          Family History:   Family History   Problem Relation Age of Onset    Cancer Maternal Grandmother     Cancer Paternal Grandmother     Clotting disorder Neg Hx     Diabetes Neg Hx     Dislocations Neg Hx     Osteoporosis Neg Hx     Psoriasis Neg Hx     Rashes / Skin problems Neg Hx     Severe sprains Neg Hx     Ulcerative colitis Neg Hx     Celiac disease Neg Hx     Cirrhosis Neg Hx     Colon cancer Neg Hx     Colon polyps Neg Hx     Crohn's disease Neg Hx     Cystic fibrosis Neg Hx     Esophageal cancer Neg Hx     Hemochromatosis Neg Hx     Inflammatory bowel disease Neg Hx     Irritable bowel syndrome Neg Hx     Liver cancer Neg Hx     Liver disease Neg Hx     Stomach cancer Neg Hx     Rectal cancer Neg Hx     Lymphoma Neg Hx     Fausto's disease Neg Hx     Tuberculosis Neg Hx     Scleroderma Neg Hx     Multiple sclerosis Neg Hx     Melanoma Neg Hx     Lupus Neg Hx     Rheum arthritis Neg Hx         Social History:   Social History     Social History    Marital status: Single     Spouse name: N/A    Number of children: N/A    Years of education: N/A     Social History Main Topics    Smoking status: Former Smoker     Packs/day: 0.75     Years: 8.00     Types: Cigarettes     Quit date: 11/20/2006    Smokeless tobacco: Never Used     "Alcohol use 4.2 oz/week     7 Glasses of wine per week    Drug use:      Types: Benzodiazepines, Marijuana    Sexual activity: Yes     Birth control/ protection: Condom     Other Topics Concern    None     Social History Narrative    None        Review of patient's allergies indicates:   Allergen Reactions    Iodinated contrast- oral and iv dye Anaphylaxis    Ciprofloxacin Rash       Current Outpatient Prescriptions   Medication Sig Dispense Refill    albuterol (PROVENTIL) 2.5 mg /3 mL (0.083 %) nebulizer solution Take 3 mLs (2.5 mg total) by nebulization every 4 (four) hours as needed for Wheezing. 1 Box 2    dextroamphetamine-amphetamine (ADDERALL) 30 mg Tab Take 30 mg by mouth 5 (five) times daily.       omeprazole (PRILOSEC) 40 MG capsule Take 1 capsule by mouth  once a day 90 capsule 3    polyethylene glycol (GLYCOLAX) 17 gram/dose powder Take 25 g by mouth once daily. 1530 g 5    QVAR 40 mcg/actuation Aero Inhale 2 puffs into the  lungs once daily 17.4 each 12    sodium chloride 0.9% 0.9 % SolP 50 mL with ketamine 100 mg/mL Soln 10 mg/mL Inject 2,267 mcg/kg/min into the vein every 30 days.      vitamin D 1000 units Tab Take 1,000 Units by mouth once daily.       lorazepam (ATIVAN) 1 MG tablet Take 1 mg by mouth every 6 (six) hours as needed for Anxiety.       No current facility-administered medications for this visit.         Objective Findings:  Vital Signs:  /74   Pulse 66   Ht 5' 4" (1.626 m)   Wt 69.4 kg (153 lb)   LMP 08/30/2017   BMI 26.26 kg/m²   Body mass index is 26.26 kg/m².    Physical Exam:  General appearance: alert, cooperative, no distress  HENT: Normocephalic, atraumatic, neck symmetrical, no nasal discharge  Eyes: conjunctivae/corneas clear, PERRL, EOM's intact  Lungs: clear to auscultation bilaterally, no dullness to percussion bilaterally  Heart: regular rate and rhythm without rub; no displacement of the PMI  Abdomen: soft, non-tender; bowel sounds normoactive; no " organomegaly  Extremities: extremities symmetric; no clubbing, cyanosis, or edema  Integument: Skin color, texture, turgor normal; no rashes; hair distrubution normal  Neurologic: Alert and oriented X 3, normal strength, normal coordination and gait  Psychiatric: no pressured speech; anxious and depressed affect, tearful during apt, no evidence of impaired cognition    Labs:  Lab Results   Component Value Date    WBC 7.07 09/18/2017    HGB 14.6 09/18/2017    HCT 42.7 09/18/2017    MCV 95 09/18/2017     09/18/2017     No results found for: FERRITIN  Lab Results   Component Value Date     09/18/2017    K 3.9 09/18/2017     09/18/2017    CO2 22 (L) 09/18/2017    GLU 80 09/18/2017    BUN 10 09/18/2017    CREATININE 0.8 09/18/2017    CALCIUM 9.4 09/18/2017    PROT 7.5 09/18/2017    ALBUMIN 4.2 09/18/2017    BILITOT 0.7 09/18/2017    ALKPHOS 80 09/18/2017    AST 16 09/18/2017    ALT 11 09/18/2017     Lab Results   Component Value Date    TSH 2.665 09/18/2017     Lab Results   Component Value Date    SEDRATE 2 12/27/2008     No results found for: CRP  No results found for: LABA1C, HGBA1C        Assessment and Plan:  Cynthia Dailey is a 37 y.o. female with MDD, anxiety, ADD, asthma, cholecystectomy, rectopexy for rectal prolapse 2015 here for second opinion regarding the following problems:    GERD and regurgitation.  -Cont prilosec 40mg daily     Early satiety. Occasional nausea, no emesis.      Diffuse abdominal pain that improves with BMs. IBS-C  -Bentyl prn  -Will consider Levsin, Levbid, IBgurd.   -EGD w G/D bx (currently consumes gluten)  -Colonoscopy   -start IBguard prn     Gas and bloating.   -Avoids milk product and artificial sugars.   -Discussed the role of diet in management of functional bowel disease.  Priovided handout re low FODMOP diet    Constipation.  Symptoms started 1/2015 while on pain medication after rectopexy for rectal prolapse.   -Unable to take Linzess due to inability to  sweat  -Never tried amitiza  -Seen by ASHLEY Monte, who feels that her issues seem to be stemming from poor distal colon motility and not with outlet dysfunction   -Check anorectal manometry to assess for dyssynergia  -Check sitz markers as ordered by Dr. Hernandes   -Cont 1.5 miralax daily    -Check labs   -Dr. Hernandes ordered CT w rectal contrast to r/o transition point at the rectopexy site  -Will consider acupuncture     BRBPR   -Colonoscopy        Anxiety, depression, ADD. GI symptoms have a significant negative  impact on her QOL.   -Followed by a psychiatrist in [place] every three months. Getting ketamine infusions for depression. Tried various antidepressants in the past.   Did not like to take antidepressants due to side effects.  -On ativan for anxiety.   -Discussed the benefit of psychosocial therapy in management of functional GI disorders.  PT feels that she does not have time to do it due to school and work.  Previously followed by psychologist.  Will continue to discuss at next visit.   -Discussed the role of stress reduction in management of functional GI disorders.  PT will consider midnfulness, yoga, excercise.     Pudenal neuralgia. Anorgasmia since rectopexy. Followed by Dr. Patel likely due to the shelf posteriorly (after the rectopexy).  No improvement w pelvic floor PT.       Return in about 3 months (around 12/18/2017).    1. Abdominal pain, unspecified location    2. Constipation, unspecified constipation type    3. Bloating    4. Early satiety    5. Anxiety    6. Depression, unspecified depression type          Order summary:  Orders Placed This Encounter    Tissue transglutaminase, IgA    IgA    CBC auto differential    Comprehensive metabolic panel    TSH    T4, free    Manometry Anal    Case request GI: Colonoscopy, ESOPHAGOGASTRODUODENOSCOPY (EGD)         Thank you so much for allowing me to participate in the care of Cynthia Cortes MD

## 2017-10-02 ENCOUNTER — HOSPITAL ENCOUNTER (OUTPATIENT)
Dept: RADIOLOGY | Facility: HOSPITAL | Age: 37
Discharge: HOME | End: 2017-10-02
Attending: COLON & RECTAL SURGERY
Payer: COMMERCIAL

## 2017-10-02 DIAGNOSIS — K59.00 CONSTIPATION, UNSPECIFIED CONSTIPATION TYPE: ICD-10-CM

## 2017-10-02 PROCEDURE — 74000 XR ABDOMEN AP 1 VIEW: CPT | Mod: 26,,, | Performed by: RADIOLOGY

## 2017-10-02 PROCEDURE — 74000 XR ABDOMEN AP 1 VIEW: CPT | Mod: TC,PO

## 2017-10-04 ENCOUNTER — HOSPITAL ENCOUNTER (OUTPATIENT)
Dept: RADIOLOGY | Facility: HOSPITAL | Age: 37
Discharge: HOME | End: 2017-10-04
Attending: COLON & RECTAL SURGERY
Payer: COMMERCIAL

## 2017-10-04 DIAGNOSIS — K59.00 CONSTIPATION, UNSPECIFIED CONSTIPATION TYPE: ICD-10-CM

## 2017-10-04 PROCEDURE — 74000 XR ABDOMEN AP 1 VIEW: CPT | Mod: TC,PO

## 2017-10-04 PROCEDURE — 74000 XR ABDOMEN AP 1 VIEW: CPT | Mod: 26,,, | Performed by: RADIOLOGY

## 2017-10-06 ENCOUNTER — HOSPITAL ENCOUNTER (OUTPATIENT)
Dept: RADIOLOGY | Facility: HOSPITAL | Age: 37
Discharge: HOME | End: 2017-10-06
Attending: COLON & RECTAL SURGERY
Payer: COMMERCIAL

## 2017-10-06 DIAGNOSIS — K59.00 CONSTIPATION, UNSPECIFIED CONSTIPATION TYPE: ICD-10-CM

## 2017-10-06 PROCEDURE — 74000 XR ABDOMEN AP 1 VIEW: CPT | Mod: 26,,, | Performed by: RADIOLOGY

## 2017-10-06 PROCEDURE — 74000 XR ABDOMEN AP 1 VIEW: CPT | Mod: TC,PO

## 2017-10-11 ENCOUNTER — TELEPHONE (OUTPATIENT)
Dept: GASTROENTEROLOGY | Facility: CLINIC | Age: 37
End: 2017-10-11

## 2017-10-12 ENCOUNTER — ANESTHESIA EVENT (OUTPATIENT)
Dept: ENDOSCOPY | Facility: HOSPITAL | Age: 37
End: 2017-10-12
Payer: COMMERCIAL

## 2017-10-12 ENCOUNTER — ANESTHESIA (OUTPATIENT)
Dept: ENDOSCOPY | Facility: HOSPITAL | Age: 37
End: 2017-10-12
Payer: COMMERCIAL

## 2017-10-12 ENCOUNTER — TELEPHONE (OUTPATIENT)
Dept: GASTROENTEROLOGY | Facility: CLINIC | Age: 37
End: 2017-10-12

## 2017-10-12 ENCOUNTER — SURGERY (OUTPATIENT)
Age: 37
End: 2017-10-12

## 2017-10-12 ENCOUNTER — HOSPITAL ENCOUNTER (OUTPATIENT)
Facility: HOSPITAL | Age: 37
Discharge: HOME | End: 2017-10-12
Attending: INTERNAL MEDICINE | Admitting: INTERNAL MEDICINE
Payer: COMMERCIAL

## 2017-10-12 VITALS
BODY MASS INDEX: 25.61 KG/M2 | RESPIRATION RATE: 16 BRPM | OXYGEN SATURATION: 99 % | HEART RATE: 63 BPM | HEIGHT: 64 IN | TEMPERATURE: 99 F | DIASTOLIC BLOOD PRESSURE: 63 MMHG | SYSTOLIC BLOOD PRESSURE: 107 MMHG | WEIGHT: 150 LBS

## 2017-10-12 VITALS — RESPIRATION RATE: 19 BRPM

## 2017-10-12 DIAGNOSIS — K62.5 BRBPR (BRIGHT RED BLOOD PER RECTUM): Primary | ICD-10-CM

## 2017-10-12 PROCEDURE — 37000008 HC ANESTHESIA 1ST 15 MINUTES: Performed by: INTERNAL MEDICINE

## 2017-10-12 PROCEDURE — 27201012 HC FORCEPS, HOT/COLD, DISP: Performed by: INTERNAL MEDICINE

## 2017-10-12 PROCEDURE — 43239 EGD BIOPSY SINGLE/MULTIPLE: CPT | Performed by: INTERNAL MEDICINE

## 2017-10-12 PROCEDURE — 43239 EGD BIOPSY SINGLE/MULTIPLE: CPT | Mod: ,,, | Performed by: INTERNAL MEDICINE

## 2017-10-12 PROCEDURE — 25000003 PHARM REV CODE 250: Performed by: INTERNAL MEDICINE

## 2017-10-12 PROCEDURE — 88305 TISSUE EXAM BY PATHOLOGIST: CPT | Performed by: PATHOLOGY

## 2017-10-12 PROCEDURE — 45378 DIAGNOSTIC COLONOSCOPY: CPT | Mod: 51,,, | Performed by: INTERNAL MEDICINE

## 2017-10-12 PROCEDURE — D9220A PRA ANESTHESIA: Mod: ANES,,, | Performed by: ANESTHESIOLOGY

## 2017-10-12 PROCEDURE — 45380 COLONOSCOPY AND BIOPSY: CPT | Performed by: INTERNAL MEDICINE

## 2017-10-12 PROCEDURE — D9220A PRA ANESTHESIA: Mod: CRNA,,, | Performed by: NURSE ANESTHETIST, CERTIFIED REGISTERED

## 2017-10-12 PROCEDURE — 88305 TISSUE EXAM BY PATHOLOGIST: CPT | Mod: 26,,, | Performed by: PATHOLOGY

## 2017-10-12 PROCEDURE — 63600175 PHARM REV CODE 636 W HCPCS: Performed by: NURSE ANESTHETIST, CERTIFIED REGISTERED

## 2017-10-12 PROCEDURE — 37000009 HC ANESTHESIA EA ADD 15 MINS: Performed by: INTERNAL MEDICINE

## 2017-10-12 RX ORDER — PROPOFOL 10 MG/ML
VIAL (ML) INTRAVENOUS
Status: DISCONTINUED | OUTPATIENT
Start: 2017-10-12 | End: 2017-10-12

## 2017-10-12 RX ORDER — SODIUM CHLORIDE 9 MG/ML
INJECTION, SOLUTION INTRAVENOUS CONTINUOUS
Status: DISCONTINUED | OUTPATIENT
Start: 2017-10-12 | End: 2017-10-12 | Stop reason: HOSPADM

## 2017-10-12 RX ORDER — LIDOCAINE HCL/PF 100 MG/5ML
SYRINGE (ML) INTRAVENOUS
Status: DISCONTINUED | OUTPATIENT
Start: 2017-10-12 | End: 2017-10-12

## 2017-10-12 RX ADMIN — PROPOFOL 50 MG: 10 INJECTION, EMULSION INTRAVENOUS at 10:10

## 2017-10-12 RX ADMIN — LIDOCAINE HYDROCHLORIDE 75 MG: 20 INJECTION, SOLUTION INTRAVENOUS at 10:10

## 2017-10-12 RX ADMIN — SODIUM CHLORIDE: 0.9 INJECTION, SOLUTION INTRAVENOUS at 09:10

## 2017-10-12 RX ADMIN — PROPOFOL 100 MG: 10 INJECTION, EMULSION INTRAVENOUS at 10:10

## 2017-10-12 RX ADMIN — PROPOFOL 5 MG: 10 INJECTION, EMULSION INTRAVENOUS at 10:10

## 2017-10-12 NOTE — DISCHARGE INSTRUCTIONS

## 2017-10-12 NOTE — ANESTHESIA PREPROCEDURE EVALUATION
10/12/2017  Cynthia Dailey is a 37 y.o., female.  Pre-operative evaluation for ESOPHAGOGASTRODUODENOSCOPY (EGD) (N/A), Colonoscopy (N/A)    Chief Complaint:abd pain    PMH:   ADD (attention deficit disorder)    Anxiety    Asthma, controlled, last episode 6 weeks ago    Depression    GERD (gastroesophageal reflux disease)    Rectal prolapse         Past Surgical History:   Procedure Laterality Date    CHOLECYSTECTOMY      COLON SURGERY  2015    Rectal Prolaps    MANDIBLE FRACTURE SURGERY      RECTAL PROLAPSE REPAIR, RECTOPEXY           Vital Signs Range (Last 24H):         CBC:     Recent Labs  Lab 17  1225   WBC 7.07   RBC 4.52   HGB 14.6   HCT 42.7      MCV 95   MCH 32.3*   MCHC 34.2       CMP:   Recent Labs  Lab 17  1225      K 3.9      CO2 22*   BUN 10   CREATININE 0.8   GLU 80   CALCIUM 9.4   ALBUMIN 4.2   PROT 7.5   ALKPHOS 80   ALT 11   AST 16   BILITOT 0.7       INR:  No results for input(s): INR, PROTIME, APTT in the last 720 hours.    Invalid input(s): PT      Diagnostic Studies:      EKD Echo:  Anesthesia Evaluation         Review of Systems      Physical Exam  General:  Well nourished    Airway/Jaw/Neck:  Airway Findings: Mouth Opening: Normal Tongue: Normal  General Airway Assessment: Good  Mallampati: I  TM Distance: Normal, at least 6 cm  Jaw/Neck Findings:  Neck ROM: Normal ROM      Dental:  Dental Findings: In tact   Chest/Lungs:  Chest/Lungs Findings: Clear to auscultation, Normal Respiratory Rate     Heart/Vascular:  Heart Findings: Rate: Normal  Rhythm: Regular Rhythm  Sounds: Normal        Mental Status:  Mental Status Findings:  Cooperative, Alert and Oriented         Anesthesia Plan  Type of Anesthesia, risks & benefits discussed:  Anesthesia Type:  general  Patient's Preference:   Intra-op Monitoring Plan:   Intra-op Monitoring Plan  Comments:   Post Op Pain Control Plan:   Post Op Pain Control Plan Comments:   Induction:   IV  Beta Blocker:  Patient is not currently on a Beta-Blocker (No further documentation required).       Informed Consent: Patient understands risks and agrees with Anesthesia plan.  Questions answered. Anesthesia consent signed with patient.  ASA Score: 2     Day of Surgery Review of History & Physical:    H&P update referred to the surgeon.         Ready For Surgery From Anesthesia Perspective.

## 2017-10-12 NOTE — ANESTHESIA POSTPROCEDURE EVALUATION
"Anesthesia Post Evaluation    Patient: Cynthia Dailey    Procedure(s) Performed: Procedure(s) (LRB):  ESOPHAGOGASTRODUODENOSCOPY (EGD) (N/A)  Colonoscopy (N/A)    Final Anesthesia Type: general  Patient location during evaluation: PACU  Patient participation: Yes- Able to Participate  Level of consciousness: awake and alert and oriented  Post-procedure vital signs: reviewed and stable  Pain management: adequate  Airway patency: patent  PONV status at discharge: No PONV  Anesthetic complications: no      Cardiovascular status: hemodynamically stable  Respiratory status: unassisted  Hydration status: euvolemic  Follow-up not needed.        Visit Vitals  BP (!) 114/58   Pulse 75   Temp 37 °C (98.6 °F)   Resp 16   Ht 5' 4" (1.626 m)   Wt 68 kg (150 lb)   LMP 10/02/2017   SpO2 99%   Breastfeeding? No   BMI 25.75 kg/m²       Pain/Tayla Score: Pain Assessment Performed: Yes (10/12/2017  9:25 AM)  Presence of Pain: denies (10/12/2017 11:00 AM)  Tayla Score: 10 (10/12/2017 11:00 AM)      "

## 2017-10-12 NOTE — H&P
Short Stay Endoscopy History and Physical    PCP - Praveen Saldana MD    Procedure - Colonoscopy + EGD  ASA - per anesthesia  Mallampati - per anesthesia  History of Anesthesia problems - no  Family history Anesthesia problems - no   Plan of anesthesia - General / MAC    HPI:  This is a 37 y.o. female here for evaluation of : rectal bleeding, abdominal pain and constipation.    EGD for :  GERD, bloating, abdominal pain.      ROS:  Constitutional: No fevers, chills, No weight loss  CV: No chest pain  Pulm: No cough, No shortness of breath  GI: see HPI  Derm: No rash    Medical History:  has a past medical history of ADD (attention deficit disorder); Anxiety; Asthma; Corns and callosities; Depression; Dislocated jaw; GERD (gastroesophageal reflux disease); Rectal prolapse; and Wears glasses.    Surgical History:  has a past surgical history that includes Cholecystectomy; Mandible fracture surgery; Colon surgery (Jan 2015); and Rectal prolapse repair, rectopexy.    Family History: family history includes Cancer in her maternal grandmother and paternal grandmother.. Otherwise no colon cancer, inflammatory bowel disease, or GI malignancies.    Social History:  reports that she quit smoking about 10 years ago. Her smoking use included Cigarettes. She has a 6.00 pack-year smoking history. She has never used smokeless tobacco. She reports that she drinks about 4.2 oz of alcohol per week . She reports that she uses drugs, including Benzodiazepines and Marijuana.    Review of patient's allergies indicates:   Allergen Reactions    Iodinated contrast- oral and iv dye Anaphylaxis    Ciprofloxacin Rash       Medications:   Prescriptions Prior to Admission   Medication Sig Dispense Refill Last Dose    dextroamphetamine-amphetamine (ADDERALL) 30 mg Tab Take 30 mg by mouth 5 (five) times daily.    Past Week at Unknown time    lorazepam (ATIVAN) 1 MG tablet Take 1 mg by mouth every 6 (six) hours as needed for Anxiety.   Past  Week at Unknown time    omeprazole (PRILOSEC) 40 MG capsule Take 1 capsule by mouth  once a day 90 capsule 3 10/12/2017 at Unknown time    polyethylene glycol (GLYCOLAX) 17 gram/dose powder Take 25 g by mouth once daily. 1530 g 5 Past Week at Unknown time    QVAR 40 mcg/actuation Aero Inhale 2 puffs into the  lungs once daily 17.4 each 12 Past Week at Unknown time    sodium chloride 0.9% 0.9 % SolP 50 mL with ketamine 100 mg/mL Soln 10 mg/mL Inject 2,267 mcg/kg/min into the vein every 30 days.   Past Week at Unknown time    vitamin D 1000 units Tab Take 1,000 Units by mouth once daily.    10/11/2017 at Unknown time    albuterol (PROVENTIL) 2.5 mg /3 mL (0.083 %) nebulizer solution Take 3 mLs (2.5 mg total) by nebulization every 4 (four) hours as needed for Wheezing. 1 Box 2 More than a month at Unknown time         Physical Exam:    Vital Signs:   Vitals:    10/12/17 0933   BP: 114/66   Pulse: 62   Resp: 16   Temp: 97.9 °F (36.6 °C)       General Appearance: Well appearing in no acute distress  Eyes:    No scleral icterus  ENT: Neck supple, Lips, mucosa, and tongue normal  Lungs: CTA bilaterally ; nonlabored  Heart:  S1, S2 normal, no murmurs heard  Abdomen: Soft, non tender, non distended with positive bowel sounds. No hepatosplenomegaly, ascites, or mass.  Extremities: 2+ pulses, no clubbing, cyanosis or edema  Skin: No rash      Labs:  Lab Results   Component Value Date    WBC 7.07 09/18/2017    HGB 14.6 09/18/2017    HCT 42.7 09/18/2017     09/18/2017    ALT 11 09/18/2017    AST 16 09/18/2017     09/18/2017    K 3.9 09/18/2017     09/18/2017    CREATININE 0.8 09/18/2017    BUN 10 09/18/2017    CO2 22 (L) 09/18/2017    TSH 2.665 09/18/2017    INR 1.1 10/06/2010       I have explained the risks and benefits of endoscopy procedures to the patient including but not limited to bleeding, perforation, infection, and death.      Mickey Rubi MD

## 2017-10-12 NOTE — PATIENT INSTRUCTIONS
Discharge Summary/Instructions after an Endoscopic Procedure  Patient Name: Cynthia Dailey  Patient MRN: [id number]  Patient YOB: 1980 Thursday, October 12, 2017  Nerieda Cortes MD  RESTRICTIONS:  During your procedure today, you received medications for sedation.  These   medications may affect your judgment, balance and coordination.  Therefore,   for 24 hours, you have the following restrictions:   - DO NOT drive a car, operate machinery, make legal/financial decisions,   sign important papers or drink alcohol.    ACTIVITY:  The following day: return to full activity including work, except no heavy   lifting, straining or running for 3 days if polyps were removed.  DIET:  Eat and drink normally unless instructed otherwise.     TREATMENT FOR COMMON SIDE EFFECTS:  - Mild abdominal pain, belching, bloating or excessive gas: rest, eat   lightly and use a heating pad.  - Sore Throat: treat with throat lozenges and/or gargle with warm salt   water.  SYMPTOMS TO WATCH FOR AND REPORT TO YOUR PHYSICIAN:  1. Abdominal pain or bloating, other than gas cramps.  2. Chest pain.  3. Back pain.  4. Chills or fever occurring within 24 hours after the procedure.  5. Rectal bleeding, which would show as bright red, maroon, or black stools.   (A tablespoon of blood from the rectum is not serious, especially if   hemorrhoids are present.)  6. Vomiting.  7. Weakness or dizziness.  8. Because air was used during the procedure, expelling large amounts of air   from your rectum or belching is normal.  9. If a bowel prep was taken, you may not have a bowel movement for 1-3   days.  This is normal.  GO DIRECTLY TO THE NEAREST EMERGENCY ROOM IF YOU HAVE ANY OF THE FOLLOWING:      Difficulty breathing  Chills and/or fever over 101 F   Persistent vomiting and/or vomiting blood   Severe abdominal pain   Severe chest pain   Black, tarry stools   Bleeding- more than one tablespoon  Your doctor recommends these additional  instructions:  If any biopsies were taken, your doctors clinic will contact you in 1 to 2   weeks with any results.  We are waiting for your pathology results.   You are being discharged to home.   Resume your previous diet.   Continue your present medications.   The findings and recommendations have been discussed with you.   You have a contact number available for emergencies.  The signs and symptoms   of potential delayed complications were discussed with you.  You may return   to normal activities tomorrow.  Written discharge instructions were   provided to you.  For questions, problems or results please call your physician - Nereida Cortes MD at Work:  [phone].  [facility] [place], EMERGENCY ROOM PHONE NUMBER: [phone]  IF A COMPLICATION OR EMERGENCY SITUATION ARISES AND YOU ARE UNABLE TO REACH   YOUR PHYSICIAN - GO DIRECTLY TO THE EMERGENCY ROOM.  Nereida Cortes MD  10/12/2017 10:25:04 AM  This report has been verified and signed electronically.

## 2017-10-12 NOTE — TRANSFER OF CARE
"Anesthesia Transfer of Care Note    Patient: Cynthia Dailey    Procedure(s) Performed: Procedure(s) (LRB):  ESOPHAGOGASTRODUODENOSCOPY (EGD) (N/A)  Colonoscopy (N/A)    Patient location: PACU    Transport from OR: Transported from OR on room air with adequate spontaneous ventilation    Post pain: adequate analgesia    Post assessment: no apparent anesthetic complications and tolerated procedure well    Post vital signs: stable    Level of consciousness: awake, alert and oriented    Nausea/Vomiting: no nausea/vomiting    Complications: none    Transfer of care protocol was followed      Last vitals:   Visit Vitals  /66   Pulse 62   Temp 36.6 °C (97.9 °F)   Resp 16   Ht 5' 4" (1.626 m)   Wt 68 kg (150 lb)   LMP 10/02/2017   SpO2 100%   Breastfeeding? No   BMI 25.75 kg/m²     "

## 2017-10-12 NOTE — TELEPHONE ENCOUNTER
10/11/2017  7:12 PM    Mrs. Dailey is schedule for an EGD and colonoscopy with Dr. Cortes tomorrow.    She is not tolerating Golytely. At this point she has had 7-8 non-bloody episodes of emesis. She has now put the prep in there freezer to see if this helps but unsure if this will help. She is not sure that she has gotten much down and although she is having more BM than usually stool is not clear at this point in time.    She also states that she had been on Dulcolax for a couple of days and has also continued Miralax.    Other than the emesis, patient denied any shortness of breath, chest pain, or abdominal pain.    Recommendations:  -Try to consume cold prep  -EGD tomorrow +/- colonoscopy (patient asked to keep an eye on her BM just in case they are clear enough by Am)  -Patient concern that she is busy and colon must be done tomorrow because if not she will have to wait till December, I explained that I understood her concern but didn't want her to push herself to the point were she worsened and she needed to go to ED.

## 2017-10-18 ENCOUNTER — TELEPHONE (OUTPATIENT)
Dept: ENDOSCOPY | Facility: HOSPITAL | Age: 37
End: 2017-10-18

## 2017-10-18 NOTE — TELEPHONE ENCOUNTER
Spoke to Cynthia anal rectal manometry scheduled for 11/1/17  at 1:30 with arrival time of 12:30.  Prep instructions given, including NPO two hours prior to procedure start time and completion of two fleet enemas per rectum two hours prior to procedure start time.    Pt verbalizes understanding of above and written instructions given.

## 2017-10-19 ENCOUNTER — TELEPHONE (OUTPATIENT)
Dept: ENDOSCOPY | Facility: HOSPITAL | Age: 37
End: 2017-10-19

## 2017-11-01 ENCOUNTER — SURGERY (OUTPATIENT)
Age: 37
End: 2017-11-01

## 2017-11-01 ENCOUNTER — HOSPITAL ENCOUNTER (OUTPATIENT)
Facility: HOSPITAL | Age: 37
Discharge: HOME | End: 2017-11-01
Attending: INTERNAL MEDICINE | Admitting: INTERNAL MEDICINE
Payer: COMMERCIAL

## 2017-11-01 VITALS
OXYGEN SATURATION: 98 % | WEIGHT: 150 LBS | HEART RATE: 88 BPM | HEIGHT: 64 IN | RESPIRATION RATE: 16 BRPM | DIASTOLIC BLOOD PRESSURE: 84 MMHG | SYSTOLIC BLOOD PRESSURE: 130 MMHG | TEMPERATURE: 98 F | BODY MASS INDEX: 25.61 KG/M2

## 2017-11-01 DIAGNOSIS — K59.00 CONSTIPATION: ICD-10-CM

## 2017-11-01 PROCEDURE — 91122 HC ANORECTAL MANOMETRY: CPT | Performed by: INTERNAL MEDICINE

## 2017-11-01 PROCEDURE — 91122 PR ANAL PRESSURE RECORD: CPT | Mod: 26,,, | Performed by: INTERNAL MEDICINE

## 2017-11-08 ENCOUNTER — TELEPHONE (OUTPATIENT)
Dept: ENDOSCOPY | Facility: HOSPITAL | Age: 37
End: 2017-11-08

## 2017-11-09 ENCOUNTER — TELEPHONE (OUTPATIENT)
Dept: GASTROENTEROLOGY | Facility: CLINIC | Age: 37
End: 2017-11-09

## 2017-11-09 NOTE — TELEPHONE ENCOUNTER
Anorectal manometry   Abnormal study   Normal resting anal sphincter pressure  Normal squeeze  Normal anal relaxation with inadequate rectal pressure, but does not meet criteria for dyssynegia  Normal sensation   Absent RAIR  (Etiologies: possible Hirschsprungs, LAR, full myotomy on analarea, intussusception)    Patient was able to evacuate 50cc balloon.    Please let patient know that anorectal manometry shows:   Abnormal defecatory reflex    I am going to discuss this with Dr. Hernandes     She should follow up with me next available

## 2017-11-10 NOTE — TELEPHONE ENCOUNTER
Spoke with patient and answered her questions she had earlier regarding results.    Patient verbalizes understanding.

## 2017-11-12 ENCOUNTER — NURSE TRIAGE (OUTPATIENT)
Dept: ADMINISTRATIVE | Facility: CLINIC | Age: 37
End: 2017-11-12

## 2017-11-13 ENCOUNTER — TELEPHONE (OUTPATIENT)
Dept: GASTROENTEROLOGY | Facility: CLINIC | Age: 37
End: 2017-11-13

## 2017-11-13 NOTE — TELEPHONE ENCOUNTER
Pls let pt know that I discussed her case with Dr. Hernandes.  It is a very confusing and difficult picture. I would delma to do a flex sig with biopsies of her rectum to rule out Hirschprungs disease (very rare congenital abnormality of the rectum that can cause constipation. This usually happens in children but in

## 2017-11-13 NOTE — TELEPHONE ENCOUNTER
----- Message from PRISCA Hernandes MD sent at 11/12/2017  7:46 AM CST -----  Not sure if this is related to surgery  Prior defecography showed minimal relaxation of pelvic floor/ puborectalis with straining indicative of pelvic outlet obstruction.    Ability to evacuate balloon however argues against both  Hirschsprungs and pelvic dyssynergia so very complex picture  Biopsies may be helpful to sort this out  However, constellation of symptoms - anorgasmia etc - argues for pelvic nerve issue.    Would consider neurology consultation as discussed  John Paul

## 2017-11-13 NOTE — TELEPHONE ENCOUNTER
Reason for Disposition   [1] Difficulty breathing AND [2] not severe    Protocols used: ST SORE THROAT-A-AH

## 2017-11-16 ENCOUNTER — TELEPHONE (OUTPATIENT)
Dept: GASTROENTEROLOGY | Facility: CLINIC | Age: 37
End: 2017-11-16

## 2017-11-16 NOTE — TELEPHONE ENCOUNTER
Spoke with patient and patient was wondering about the consult to neurology?    Patient would like to get everything done before the end of the year.

## 2017-11-17 NOTE — TELEPHONE ENCOUNTER
Please let her know that since this is a little bit complicated I am still trying to find the best neurologist for her.  I got few recommendations and have reached out to the neurologists to see if they feel that they can help her

## 2017-11-27 RX ORDER — ALBUTEROL SULFATE 0.83 MG/ML
2.5 SOLUTION RESPIRATORY (INHALATION) EVERY 4 HOURS PRN
Qty: 1 BOX | Refills: 3 | Status: SHIPPED | OUTPATIENT
Start: 2017-11-27 | End: 2017-11-29 | Stop reason: SDUPTHER

## 2017-11-27 NOTE — TELEPHONE ENCOUNTER
----- Message from Jeff Grijalva sent at 11/27/2017  8:58 AM CST -----  Contact: same  Patient called in and is requesting a refill on her Albuterol 2.5 mg/3 ml.  90 day supply with 3 refills.    [facility] - [address]  [address]  [facility] [address]  Phone: [phone] Fax: [phone]    Patient call back number is [phone]

## 2017-11-28 ENCOUNTER — OFFICE VISIT (OUTPATIENT)
Dept: URGENT CARE | Facility: CLINIC | Age: 37
End: 2017-11-28
Payer: COMMERCIAL

## 2017-11-28 VITALS
DIASTOLIC BLOOD PRESSURE: 80 MMHG | HEIGHT: 64 IN | BODY MASS INDEX: 26.66 KG/M2 | HEART RATE: 102 BPM | SYSTOLIC BLOOD PRESSURE: 113 MMHG | WEIGHT: 156.13 LBS | TEMPERATURE: 100 F | OXYGEN SATURATION: 99 %

## 2017-11-28 DIAGNOSIS — R05.9 COUGH: ICD-10-CM

## 2017-11-28 DIAGNOSIS — J32.0 MAXILLARY SINUSITIS, UNSPECIFIED CHRONICITY: Primary | ICD-10-CM

## 2017-11-28 LAB
CTP QC/QA: YES
FLUAV AG NPH QL: NEGATIVE
FLUBV AG NPH QL: NEGATIVE

## 2017-11-28 PROCEDURE — 99213 OFFICE O/P EST LOW 20 MIN: CPT | Mod: 25,S$GLB,, | Performed by: INTERNAL MEDICINE

## 2017-11-28 PROCEDURE — 87804 INFLUENZA ASSAY W/OPTIC: CPT | Mod: 59,QW,S$GLB, | Performed by: INTERNAL MEDICINE

## 2017-11-28 PROCEDURE — 96372 THER/PROPH/DIAG INJ SC/IM: CPT | Mod: S$GLB,,, | Performed by: INTERNAL MEDICINE

## 2017-11-28 RX ORDER — AMOXICILLIN AND CLAVULANATE POTASSIUM 875; 125 MG/1; MG/1
1 TABLET, FILM COATED ORAL 2 TIMES DAILY
Qty: 20 TABLET | Refills: 0 | Status: SHIPPED | OUTPATIENT
Start: 2017-11-28 | End: 2017-12-04 | Stop reason: ALTCHOICE

## 2017-11-28 RX ORDER — MOMETASONE FUROATE 50 UG/1
2 SPRAY, METERED NASAL DAILY
Qty: 17 G | Refills: 1 | Status: SHIPPED | OUTPATIENT
Start: 2017-11-28 | End: 2018-08-30

## 2017-11-28 RX ORDER — METHYLPREDNISOLONE 4 MG/1
TABLET ORAL
Qty: 1 PACKAGE | Refills: 0 | Status: SHIPPED | OUTPATIENT
Start: 2017-11-28 | End: 2017-12-04 | Stop reason: ALTCHOICE

## 2017-11-28 RX ORDER — BENZONATATE 200 MG/1
200 CAPSULE ORAL 3 TIMES DAILY PRN
Qty: 30 CAPSULE | Refills: 1 | Status: SHIPPED | OUTPATIENT
Start: 2017-11-28 | End: 2017-12-04

## 2017-11-28 RX ORDER — DEXAMETHASONE SODIUM PHOSPHATE 100 MG/10ML
10 INJECTION INTRAMUSCULAR; INTRAVENOUS
Status: COMPLETED | OUTPATIENT
Start: 2017-11-28 | End: 2017-11-28

## 2017-11-28 RX ADMIN — DEXAMETHASONE SODIUM PHOSPHATE 10 MG: 100 INJECTION INTRAMUSCULAR; INTRAVENOUS at 09:11

## 2017-11-28 NOTE — PATIENT INSTRUCTIONS
Sinusitis (Antibiotic Treatment)    The sinuses are air-filled spaces within the bones of the face. They connect to the inside of the nose. Sinusitis is an inflammation of the tissue lining the sinus cavity. Sinus inflammation can occur during a cold. It can also be due to allergies to pollens and other particles in the air. Sinusitis can cause symptoms of sinus congestion and fullness. A sinus infection causes fever, headache and facial pain. There is often green or yellow drainage from the nose or into the back of the throat (post-nasal drip). You have been given antibiotics to treat this condition.  Home care:  · Take the full course of antibiotics as instructed. Do not stop taking them, even if you feel better.  · Drink plenty of water, hot tea, and other liquids. This may help thin mucus. It also may promote sinus drainage.  · Heat may help soothe painful areas of the face. Use a towel soaked in hot water. Or,  the shower and direct the hot spray onto your face. Using a vaporizer along with a menthol rub at night may also help.   · An expectorant containing guaifenesin may help thin the mucus and promote drainage from the sinuses.  · Over-the-counter decongestants may be used unless a similar medicine was prescribed. Nasal sprays work the fastest. Use one that contains phenylephrine or oxymetazoline. First blow the nose gently. Then use the spray. Do not use these medicines more often than directed on the label or symptoms may get worse. You may also use tablets containing pseudoephedrine. Avoid products that combine ingredients, because side effects may be increased. Read labels. You can also ask the pharmacist for help. (NOTE: Persons with high blood pressure should not use decongestants. They can raise blood pressure.)  · Over-the-counter antihistamines may help if allergies contributed to your sinusitis.    · Do not use nasal rinses or irrigation during an acute sinus infection, unless told to by  your health care provider. Rinsing may spread the infection to other sinuses.  · Use acetaminophen or ibuprofen to control pain, unless another pain medicine was prescribed. (If you have chronic liver or kidney disease or ever had a stomach ulcer, talk with your doctor before using these medicines. Aspirin should never be used in anyone under 18 years of age who is ill with a fever. It may cause severe liver damage.)  · Don't smoke. This can worsen symptoms.  Follow-up care  Follow up with your healthcare provider or our staff if you are not improving within the next week.  When to seek medical advice  Call your healthcare provider if any of these occur:  · Facial pain or headache becoming more severe  · Stiff neck  · Unusual drowsiness or confusion  · Swelling of the forehead or eyelids  · Vision problems, including blurred or double vision  · Fever of 100.4ºF (38ºC) or higher, or as directed by your healthcare provider  · Seizure  · Breathing problems  · Symptoms not resolving within 10 days

## 2017-11-28 NOTE — PROGRESS NOTES
"Subjective:       Patient ID: Cynthia Dailey is a 37 y.o. female.    Vitals:  height is 5' 4" (1.626 m) and weight is 70.8 kg (156 lb 1.6 oz). Her oral temperature is 100 °F (37.8 °C). Her blood pressure is 113/80 and her pulse is 102. Her oxygen saturation is 99%.     Chief Complaint: Cough    Pt completed 10 days of Augmentin, the last dose was 3 days ago.  She has a history of cough X 3 weeks.  She got partial relief but now persistent sore throat and ear discomfort.      Cough   This is a new problem. The current episode started in the past 7 days. The problem has been gradually worsening. Associated symptoms include chills, headaches, a sore throat and wheezing. Pertinent negatives include no ear pain, fever, myalgias or shortness of breath. The symptoms are aggravated by animals and lying down.     Review of Systems   Constitution: Positive for chills. Negative for fever and malaise/fatigue.   HENT: Positive for congestion, hoarse voice and sore throat. Negative for ear pain.    Respiratory: Positive for cough, sputum production (sometimes) and wheezing. Negative for shortness of breath.    Musculoskeletal: Negative for myalgias.   Gastrointestinal: Negative for abdominal pain.   Neurological: Positive for headaches.       Objective:      Physical Exam   Constitutional: She is oriented to person, place, and time. She appears well-developed and well-nourished. She is cooperative.  Non-toxic appearance. She does not appear ill. No distress.   Frequently coughing   HENT:   Head: Normocephalic and atraumatic.   Right Ear: Hearing, external ear and ear canal normal. No middle ear effusion.   Left Ear: Hearing, external ear and ear canal normal. A middle ear effusion is present.   Nose: No mucosal edema, rhinorrhea or nasal deformity. No epistaxis. Right sinus exhibits maxillary sinus tenderness. Right sinus exhibits no frontal sinus tenderness. Left sinus exhibits maxillary sinus tenderness. Left sinus exhibits no " frontal sinus tenderness.   Mouth/Throat: Uvula is midline, oropharynx is clear and moist and mucous membranes are normal. No trismus in the jaw. Normal dentition. No uvula swelling. No posterior oropharyngeal erythema.   Eyes: Conjunctivae and lids are normal.   Sclera clear bilat   Neck: Trachea normal, full passive range of motion without pain and phonation normal. Neck supple.   Cardiovascular: Normal rate, regular rhythm, normal heart sounds, intact distal pulses and normal pulses.    Pulmonary/Chest: Effort normal and breath sounds normal. No respiratory distress.   Abdominal: Soft. Normal appearance and bowel sounds are normal.   Musculoskeletal: Normal range of motion. She exhibits no edema or deformity.   Neurological: She is alert and oriented to person, place, and time. She exhibits normal muscle tone. Coordination normal.   Skin: Skin is warm, dry and intact. No rash noted.   Psychiatric: She has a normal mood and affect. Her speech is normal. Cognition and memory are normal.   Nursing note and vitals reviewed.      Assessment:       1. Maxillary sinusitis, unspecified chronicity    2. Cough        Plan:         Maxillary sinusitis, unspecified chronicity  -     amoxicillin-clavulanate 875-125mg (AUGMENTIN) 875-125 mg per tablet; Take 1 tablet by mouth 2 (two) times daily.  Dispense: 20 tablet; Refill: 0  -     dexamethasone injection 10 mg; Inject 1 mL (10 mg total) into the muscle one time.  -     benzonatate (TESSALON) 200 MG capsule; Take 1 capsule (200 mg total) by mouth 3 (three) times daily as needed for Cough.  Dispense: 30 capsule; Refill: 1  -     methylPREDNISolone (MEDROL DOSEPACK) 4 mg tablet; Take all pills on each row once daily  Dispense: 1 Package; Refill: 0  -     mometasone (NASONEX) 50 mcg/actuation nasal spray; 2 sprays by Nasal route once daily.  Dispense: 17 g; Refill: 1  -     POCT Influenza A/B    Cough

## 2017-11-29 RX ORDER — ALBUTEROL SULFATE 0.83 MG/ML
2.5 SOLUTION RESPIRATORY (INHALATION) EVERY 4 HOURS PRN
Qty: 1 BOX | Refills: 11 | Status: SHIPPED | OUTPATIENT
Start: 2017-11-29 | End: 2017-12-04 | Stop reason: SDUPTHER

## 2017-12-01 ENCOUNTER — TELEPHONE (OUTPATIENT)
Dept: URGENT CARE | Facility: CLINIC | Age: 37
End: 2017-12-01

## 2017-12-04 ENCOUNTER — OFFICE VISIT (OUTPATIENT)
Dept: FAMILY MEDICINE | Facility: CLINIC | Age: 37
End: 2017-12-04
Payer: COMMERCIAL

## 2017-12-04 VITALS
TEMPERATURE: 98 F | BODY MASS INDEX: 25.97 KG/M2 | DIASTOLIC BLOOD PRESSURE: 84 MMHG | WEIGHT: 152.13 LBS | HEART RATE: 94 BPM | SYSTOLIC BLOOD PRESSURE: 122 MMHG | RESPIRATION RATE: 18 BRPM | OXYGEN SATURATION: 98 % | HEIGHT: 64 IN

## 2017-12-04 DIAGNOSIS — J45.21 MILD INTERMITTENT ASTHMA WITH EXACERBATION: Primary | ICD-10-CM

## 2017-12-04 DIAGNOSIS — J40 BRONCHITIS: ICD-10-CM

## 2017-12-04 DIAGNOSIS — M87.052 AVASCULAR NECROSIS OF BONES OF BOTH HIPS: ICD-10-CM

## 2017-12-04 DIAGNOSIS — M87.051 AVASCULAR NECROSIS OF BONES OF BOTH HIPS: ICD-10-CM

## 2017-12-04 PROCEDURE — 99999 PR PBB SHADOW E&M-EST. PATIENT-LVL III: CPT | Mod: PBBFAC,,, | Performed by: NURSE PRACTITIONER

## 2017-12-04 PROCEDURE — 99214 OFFICE O/P EST MOD 30 MIN: CPT | Mod: S$GLB,,, | Performed by: NURSE PRACTITIONER

## 2017-12-04 RX ORDER — ALBUTEROL SULFATE 0.83 MG/ML
2.5 SOLUTION RESPIRATORY (INHALATION) EVERY 4 HOURS PRN
Qty: 1 BOX | Refills: 11 | Status: SHIPPED | OUTPATIENT
Start: 2017-12-04 | End: 2023-01-05

## 2017-12-04 RX ORDER — DOXYCYCLINE HYCLATE 100 MG
100 TABLET ORAL EVERY 12 HOURS
Qty: 14 TABLET | Refills: 0 | Status: SHIPPED | OUTPATIENT
Start: 2017-12-04 | End: 2017-12-11

## 2017-12-04 RX ORDER — PROMETHAZINE HYDROCHLORIDE AND DEXTROMETHORPHAN HYDROBROMIDE 6.25; 15 MG/5ML; MG/5ML
5 SYRUP ORAL NIGHTLY PRN
Qty: 118 ML | Refills: 0 | Status: SHIPPED | OUTPATIENT
Start: 2017-12-04 | End: 2017-12-14

## 2017-12-04 NOTE — LETTER
December 4, 2017      [facility] [place]  [address] [place] [address]  Phone: [phone]  Fax: [phone]       Patient: Cynthia Dailey   YOB: 1980  Date of Visit: 12/04/2017    To Whom It May Concern:    Julius Dailey  was at [facility] on 12/04/2017. She may return to  school on 12/11/17 with no restrictions. If you have any questions or concerns, or if I can be of further assistance, please do not hesitate to contact me.    Sincerely,    Kendra Love NP

## 2017-12-05 ENCOUNTER — PATIENT MESSAGE (OUTPATIENT)
Dept: FAMILY MEDICINE | Facility: CLINIC | Age: 37
End: 2017-12-05

## 2017-12-05 NOTE — PROGRESS NOTES
Subjective:       Patient ID: Cynthia Dailey is a 37 y.o. female.    Chief Complaint: Cough (stated that this is 2nd course of antibiotics and steroid); Nasal Congestion; Wheezing; and Fever    Ms. Dailey is a new patient to me. She presents today with complaints of persistent cough. She was treated for sinusitis with 2 rounds of Augmentin, medrol dose pack, nasonex and tessalon pearls. +history avascular necrosis of both hips    Cough   This is a new problem. The current episode started 1 to 4 weeks ago. The problem has been unchanged. The problem occurs every few minutes. The cough is non-productive. Associated symptoms include ear pain, nasal congestion and wheezing. Pertinent negatives include no chest pain, eye redness, fever, hemoptysis, rash, sore throat or shortness of breath. The symptoms are aggravated by dust. She has tried OTC cough suppressant, oral steroids, rest, steroid inhaler and a beta-agonist inhaler for the symptoms. The treatment provided mild relief. Her past medical history is significant for asthma.     Vitals:    12/04/17 1842   BP: 122/84   Pulse: 94   Resp: 18   Temp: 98 °F (36.7 °C)     Review of Systems   Constitutional: Positive for fatigue. Negative for diaphoresis and fever.   HENT: Positive for ear pain. Negative for facial swelling, sore throat and trouble swallowing.    Eyes: Negative.  Negative for discharge and redness.   Respiratory: Positive for cough and wheezing. Negative for hemoptysis and shortness of breath.    Cardiovascular: Negative.  Negative for chest pain and palpitations.   Gastrointestinal: Negative.  Negative for abdominal pain and vomiting.   Genitourinary: Negative.  Negative for difficulty urinating and flank pain.   Musculoskeletal: Negative.  Negative for back pain and neck pain.   Skin: Negative.  Negative for rash and wound.   Neurological: Negative.  Negative for dizziness and light-headedness.   Hematological: Negative.    Psychiatric/Behavioral: Negative.   Negative for confusion. The patient is not nervous/anxious.        Past Medical History:   Diagnosis Date    ADD (attention deficit disorder)     Anxiety     Asthma     Corns and callosities     Depression     Dislocated jaw     GERD (gastroesophageal reflux disease)     Rectal prolapse     Wears glasses      Objective:      Physical Exam   Constitutional: She is oriented to person, place, and time. She does not have a sickly appearance. No distress.   HENT:   Head: Normocephalic.   Right Ear: Hearing normal. Tympanic membrane is bulging.   Left Ear: Hearing normal. Tympanic membrane is bulging.   Nose: Nose normal.   Mouth/Throat: Uvula is midline, oropharynx is clear and moist and mucous membranes are normal.   Eyes: Conjunctivae and lids are normal.   Neck: Trachea normal and normal range of motion. Neck supple. No JVD present. No tracheal deviation present.   Cardiovascular: Normal rate, regular rhythm, S1 normal, S2 normal and normal heart sounds.    Pulmonary/Chest: Effort normal. She has wheezes. She has rhonchi. She exhibits no tenderness.   Abdominal: Normal appearance. She exhibits no distension.   Musculoskeletal: Normal range of motion. She exhibits no edema or deformity.   Neurological: She is alert and oriented to person, place, and time.   Skin: Skin is warm and dry. She is not diaphoretic. No pallor.   Psychiatric: She has a normal mood and affect. Her speech is normal and behavior is normal. Judgment and thought content normal. Cognition and memory are normal.   Nursing note and vitals reviewed.      Assessment:       1. Mild intermittent asthma with exacerbation    2. Bronchitis    3. Avascular necrosis of bones of both hips        Plan:       Mild intermittent asthma with exacerbation  -     doxycycline (VIBRA-TABS) 100 MG tablet; Take 1 tablet (100 mg total) by mouth every 12 (twelve) hours.  Dispense: 14 tablet; Refill: 0  -     albuterol (PROVENTIL) 2.5 mg /3 mL (0.083 %) nebulizer  solution; Take 3 mLs (2.5 mg total) by nebulization every 4 (four) hours as needed for Wheezing.  Dispense: 1 Box; Refill: 11  - Nebs q4h atc  - Qvar compliance     Bronchitis  -     promethazine-dextromethorphan (PROMETHAZINE-DM) 6.25-15 mg/5 mL Syrp; Take 5 mLs by mouth nightly as needed (cough).  Dispense: 118 mL; Refill: 0  -     doxycycline (VIBRA-TABS) 100 MG tablet; Take 1 tablet (100 mg total) by mouth every 12 (twelve) hours.  Dispense: 14 tablet; Refill: 0  -     albuterol (PROVENTIL) 2.5 mg /3 mL (0.083 %) nebulizer solution; Take 3 mLs (2.5 mg total) by nebulization every 4 (four) hours as needed for Wheezing.  Dispense: 1 Box; Refill: 11    Avascular necrosis of bones of both hips   Will avoid further steroids         Return if symptoms worsen or fail to improve.

## 2017-12-06 ENCOUNTER — TELEPHONE (OUTPATIENT)
Dept: GASTROENTEROLOGY | Facility: CLINIC | Age: 37
End: 2017-12-06

## 2017-12-06 NOTE — TELEPHONE ENCOUNTER
Spoke with patient and patient is going to reschedule appointment due to not hearing back from Dr. Cortes about the neurologist.    Will send message to Dr. Cortes and see how to proceed.

## 2017-12-07 ENCOUNTER — TELEPHONE (OUTPATIENT)
Dept: GASTROENTEROLOGY | Facility: CLINIC | Age: 37
End: 2017-12-07

## 2017-12-07 DIAGNOSIS — K59.00 CONSTIPATION, UNSPECIFIED CONSTIPATION TYPE: Primary | ICD-10-CM

## 2017-12-07 DIAGNOSIS — G62.9 NEUROPATHY: ICD-10-CM

## 2017-12-07 NOTE — TELEPHONE ENCOUNTER
Spoke with patient and let her know we are trying to get her in with neurology soon and will get her an appointment with Dr. Cortes after she sees neurology.    Patient verbalizes understanding.

## 2017-12-07 NOTE — TELEPHONE ENCOUNTER
Rosita,   Can you please help me with this pt.     PT has  constipation that started after rectopexy.  She also developed anorgasmia after surgery.  Her anorectal manometry showed absent rectoanal inhibitory reflex RAIR  (Etiologies: possible Hirschsprungs, LAResection, full myotomy of anal area, intussusception).  Otherwise she had normal resting anal sphincter pressure, normal squeeze, normal anal relaxation, normal sensation.  She was able to evacuate 50cc balloon.     I would like to ref her to to neurology for evaluation and possible pudendal EMG.  Two recommended neurologists were Dr.Bridget Leonard and Dr. Praveen Campos.  I send them a msg, but neither responded. Could you check which neurologist is best to evalaute this and arrange an apt w one of them asap     We can also send her to Dr. Keene at [facility]

## 2017-12-07 NOTE — TELEPHONE ENCOUNTER
Plsl let pt know that since I did not receive response from neurology, we are calling them and she should receive an apt w someone soon.  Rosita is taking care of this

## 2018-01-08 ENCOUNTER — TELEPHONE (OUTPATIENT)
Dept: GASTROENTEROLOGY | Facility: CLINIC | Age: 38
End: 2018-01-08

## 2018-01-08 NOTE — TELEPHONE ENCOUNTER
----- Message from Stevenson Ngo RN sent at 1/8/2018  2:40 PM CST -----  Contact: Chalino WADE [facility]  Greetings,    Could someone tell me if either Dr. Stein or Dr. Wade perform pudendal EMGs? I am looking for a provider who is able to complete on behalf of a GI patient who was referred to Neurology. Unfortunately, I have been unable to find any of our providers, [facility] or [facility] who actively performs them. I can be reached at [phone] or [phone]. Thank you.

## 2018-01-09 NOTE — TELEPHONE ENCOUNTER
Spoke with Eve, RN, with neuro who confirmed they are unable to perform pudendal EMGs here.    Spoke to Cynthia, let her know I am reaching out to Dr. Keene's office at [facility] for more information from them.  She verbalizes understanding and appreciates update.

## 2018-02-07 ENCOUNTER — TELEPHONE (OUTPATIENT)
Dept: GASTROENTEROLOGY | Facility: CLINIC | Age: 38
End: 2018-02-07

## 2018-02-07 NOTE — TELEPHONE ENCOUNTER
Spoke with patient and patient called Dr. Keene's office and they have not received the information from Rosita.    Patient was advised I will check on this and help get the information sent over.    Patient verified fax number to the office and it is [phone]

## 2018-02-22 ENCOUNTER — PATIENT MESSAGE (OUTPATIENT)
Dept: FAMILY MEDICINE | Facility: CLINIC | Age: 38
End: 2018-02-22

## 2018-02-22 RX ORDER — ALBUTEROL SULFATE 90 UG/1
2 AEROSOL, METERED RESPIRATORY (INHALATION) EVERY 6 HOURS PRN
Qty: 18 G | Refills: 0 | Status: SHIPPED | OUTPATIENT
Start: 2018-02-22 | End: 2018-08-30

## 2018-02-22 RX ORDER — OMEPRAZOLE 40 MG/1
40 CAPSULE, DELAYED RELEASE ORAL DAILY
Qty: 90 CAPSULE | Refills: 3 | Status: SHIPPED | OUTPATIENT
Start: 2018-02-22 | End: 2018-08-30 | Stop reason: ALTCHOICE

## 2018-02-23 ENCOUNTER — TELEPHONE (OUTPATIENT)
Dept: GASTROENTEROLOGY | Facility: CLINIC | Age: 38
End: 2018-02-23

## 2018-02-23 NOTE — TELEPHONE ENCOUNTER
----- Message from Peg Olivera sent at 2/23/2018 11:18 AM CST -----  Contact: self   nicole - calling re her referral - please call patient at

## 2018-02-23 NOTE — TELEPHONE ENCOUNTER
Spoke with patient and patient reports she never received a call from Dr. Keene's office.    Called and spoke with Dr. Keene's office, resent referral and records to a new fax given by nurse.     Spoke with patient and advised records have been sent and if she did not hear anything by Monday to give us a call.

## 2018-02-28 ENCOUNTER — TELEPHONE (OUTPATIENT)
Dept: GASTROENTEROLOGY | Facility: CLINIC | Age: 38
End: 2018-02-28

## 2018-02-28 NOTE — TELEPHONE ENCOUNTER
Attempted to call patient without success.    Left message for patient to return call to the clinic.    Patient is scheduled with Dr. Keene's office for 8/14

## 2018-03-01 ENCOUNTER — TELEPHONE (OUTPATIENT)
Dept: FAMILY MEDICINE | Facility: CLINIC | Age: 38
End: 2018-03-01

## 2018-03-01 RX ORDER — AZELASTINE HYDROCHLORIDE, FLUTICASONE PROPIONATE 137; 50 UG/1; UG/1
1 SPRAY, METERED NASAL 2 TIMES DAILY PRN
Qty: 23 G | Refills: 12 | Status: SHIPPED | OUTPATIENT
Start: 2018-03-01 | End: 2018-08-30

## 2018-03-01 NOTE — TELEPHONE ENCOUNTER
----- Message from Victor M Hunt sent at 3/1/2018 11:13 AM CST -----  Contact: Wanda with [facility]  Wanda with [employer], [phone], reference number [id number], calling in regards to a prescription received for beclomethasone (QVAR) 40 mcg/actuation Aero. That medication has been discontinued. There's an alternative which is QVAR ready inhaler 40mcg. Please advise. Thanks.

## 2018-03-01 NOTE — TELEPHONE ENCOUNTER
rec'd fax from pharmacy stating the QVAR is not longer being manufactured. Please review and advise

## 2018-04-03 ENCOUNTER — TELEPHONE (OUTPATIENT)
Dept: GASTROENTEROLOGY | Facility: CLINIC | Age: 38
End: 2018-04-03

## 2018-04-03 ENCOUNTER — PATIENT MESSAGE (OUTPATIENT)
Dept: GASTROENTEROLOGY | Facility: CLINIC | Age: 38
End: 2018-04-03

## 2018-04-03 NOTE — TELEPHONE ENCOUNTER
Spoke with patient and patient reports she received a call from Dr. Mark's office ( a pelvic ) about a referral from Dr. Cortes's office.    Patient is not under the impression she needs to see a pelvic  since she has already seen one.    Please advise.

## 2018-04-03 NOTE — TELEPHONE ENCOUNTER
Spoke w pt and clarified that I did not ref her to Dr. Mark.  PT unable to get apt w Dr. Keene until August.  I offered her ref to Dr. Lopez.  I will send her an email with more information about his center.  She will let me know If she wants to be referred.

## 2018-04-03 NOTE — TELEPHONE ENCOUNTER
----- Message from Viviana Oswald sent at 4/3/2018  1:46 PM CDT -----  Contact: Self- [phone]  Sebastian- pt is returning a call to Arabella- states this is her third time calling about the same issue and never received a call back- please call pt back at [phone]

## 2018-08-30 ENCOUNTER — OFFICE VISIT (OUTPATIENT)
Dept: FAMILY MEDICINE | Facility: CLINIC | Age: 38
End: 2018-08-30
Payer: COMMERCIAL

## 2018-08-30 VITALS
HEART RATE: 74 BPM | BODY MASS INDEX: 26.98 KG/M2 | DIASTOLIC BLOOD PRESSURE: 76 MMHG | WEIGHT: 158.06 LBS | HEIGHT: 64 IN | SYSTOLIC BLOOD PRESSURE: 112 MMHG | TEMPERATURE: 99 F

## 2018-08-30 DIAGNOSIS — M87.051 AVASCULAR NECROSIS OF BONES OF BOTH HIPS: ICD-10-CM

## 2018-08-30 DIAGNOSIS — R10.9 ABDOMINAL WALL PAIN IN RIGHT FLANK: ICD-10-CM

## 2018-08-30 DIAGNOSIS — M87.052 AVASCULAR NECROSIS OF BONES OF BOTH HIPS: ICD-10-CM

## 2018-08-30 DIAGNOSIS — K59.00 CONSTIPATION, UNSPECIFIED CONSTIPATION TYPE: Primary | ICD-10-CM

## 2018-08-30 DIAGNOSIS — J45.20 MILD INTERMITTENT CHRONIC ASTHMA WITHOUT COMPLICATION: ICD-10-CM

## 2018-08-30 PROCEDURE — 99214 OFFICE O/P EST MOD 30 MIN: CPT | Mod: S$GLB,,, | Performed by: FAMILY MEDICINE

## 2018-08-30 PROCEDURE — 99999 PR PBB SHADOW E&M-EST. PATIENT-LVL III: CPT | Mod: PBBFAC,,, | Performed by: FAMILY MEDICINE

## 2018-08-30 PROCEDURE — 3008F BODY MASS INDEX DOCD: CPT | Mod: CPTII,S$GLB,, | Performed by: FAMILY MEDICINE

## 2018-08-30 RX ORDER — PANTOPRAZOLE SODIUM 40 MG/1
40 TABLET, DELAYED RELEASE ORAL DAILY
Qty: 90 TABLET | Refills: 3 | Status: SHIPPED | OUTPATIENT
Start: 2018-08-30 | End: 2019-11-26 | Stop reason: SDUPTHER

## 2018-08-30 RX ORDER — PROPRANOLOL HYDROCHLORIDE 40 MG/1
1 TABLET ORAL DAILY
COMMUNITY
Start: 2018-07-06 | End: 2019-05-14

## 2018-08-30 RX ORDER — DEXTROAMPHETAMINE SACCHARATE, AMPHETAMINE ASPARTATE, DEXTROAMPHETAMINE SULFATE AND AMPHETAMINE SULFATE 2.5; 2.5; 2.5; 2.5 MG/1; MG/1; MG/1; MG/1
0.5 TABLET ORAL EVERY OTHER DAY
COMMUNITY
Start: 2018-07-17 | End: 2019-05-14

## 2018-08-30 RX ORDER — FLUTICASONE PROPIONATE 44 UG/1
2 AEROSOL, METERED RESPIRATORY (INHALATION) 2 TIMES DAILY
Qty: 20 G | Refills: 5 | Status: SHIPPED | OUTPATIENT
Start: 2018-08-30 | End: 2020-04-11

## 2018-08-30 RX ORDER — POLYETHYLENE GLYCOL 3350 17 G/17G
25 POWDER, FOR SOLUTION ORAL DAILY
Qty: 1530 G | Refills: 5 | Status: SHIPPED | OUTPATIENT
Start: 2018-08-30 | End: 2019-11-26 | Stop reason: SDUPTHER

## 2018-08-30 NOTE — PROGRESS NOTES
"Subjective:       Patient ID: Cynthia Dailey is a 38 y.o. female.    Chief Complaint: Abdominal Pain    FU Constipation.  She needs a refill of MiraLax.  She was unaware that it was available over the counter but she can get it less expensively through [facility].  She has chronic constipation.  She had a rectopexy for rectal prolapse in 2015 is has had difficulty with her bowels ever sense.  She has seen multiple specialists.  She has been told that she had nerve damage from the surgery.  She has seen a neurologist in [place] and has contemplated out-of-state referrals.  She has done some pelvic floor physical therapy.  Her baseline constipation is every 4 days but she can get it to every 2 days with MiraLax 1.5 capsules daily.  She does have cramping abdominal pain. She also complains of some right flank pain just above her pelvic g.  That can be sharp or dull.  She thinks it is worse after prolonged sitting.  She had a previous slip to rib on the left side.  She is currently going to school as well as working full-time.  She is recently .  Her depression has been controlled with ketamine infusions on a monthly basis.  She sees Dahiana Villaseñor.  Her asthma is stable.  She has allergic rhinitis as well.  She previously liked to use the QVAR intranasally as well as inhalation into the lungs.  She is unable to do that with the current QVAR which is breath activated.      Review of Systems   Constitutional: Negative for fever and unexpected weight change.   Gastrointestinal: Positive for abdominal pain and constipation. Negative for blood in stool.   Musculoskeletal: Positive for arthralgias (Bilateral hip pain. She has a history of avascular necrosis.).       Objective:     Blood pressure 112/76, pulse 74, temperature 98.5 °F (36.9 °C), height 5' 4" (1.626 m), weight 71.7 kg (158 lb 1.1 oz), last menstrual period 07/29/2018.      Physical Exam   Constitutional: She appears well-developed and well-nourished. No " distress.   Cardiovascular: Regular rhythm and normal heart sounds.   Pulmonary/Chest: Effort normal and breath sounds normal.   Abdominal: Soft. Bowel sounds are normal. She exhibits no distension.   She has some mild left lower quadrant tenderness.  She has some right flank tenderness but particularly right lower rib and pelvic rim.  I think that her right flank pain may be related to muscle attachments.   Psychiatric:   Tearful when she discussed all of the trials and tribulations she has gone through over the past 3 years       Assessment:       1. Constipation, unspecified constipation type    2. Avascular necrosis of bones of both hips    3. Mild intermittent chronic asthma without complication    4. Abdominal wall pain in right flank        Plan:       I refilled MiraLax.  Change omeprazole to pantoprazole at her request.  Change QVAR to Flovent.

## 2019-03-21 ENCOUNTER — OFFICE VISIT (OUTPATIENT)
Dept: FAMILY MEDICINE | Facility: CLINIC | Age: 39
End: 2019-03-21
Payer: COMMERCIAL

## 2019-03-21 VITALS
BODY MASS INDEX: 27.27 KG/M2 | SYSTOLIC BLOOD PRESSURE: 118 MMHG | WEIGHT: 159.75 LBS | DIASTOLIC BLOOD PRESSURE: 72 MMHG | OXYGEN SATURATION: 100 % | TEMPERATURE: 98 F | HEART RATE: 64 BPM | HEIGHT: 64 IN

## 2019-03-21 DIAGNOSIS — Z00.00 WELL WOMAN EXAM WITHOUT GYNECOLOGICAL EXAM: ICD-10-CM

## 2019-03-21 DIAGNOSIS — R05.9 COUGH: ICD-10-CM

## 2019-03-21 DIAGNOSIS — R68.89 COLD INTOLERANCE: ICD-10-CM

## 2019-03-21 DIAGNOSIS — R53.83 FATIGUE, UNSPECIFIED TYPE: Primary | ICD-10-CM

## 2019-03-21 PROCEDURE — 3008F PR BODY MASS INDEX (BMI) DOCUMENTED: ICD-10-PCS | Mod: CPTII,S$GLB,, | Performed by: NURSE PRACTITIONER

## 2019-03-21 PROCEDURE — 99214 PR OFFICE/OUTPT VISIT, EST, LEVL IV, 30-39 MIN: ICD-10-PCS | Mod: S$GLB,,, | Performed by: NURSE PRACTITIONER

## 2019-03-21 PROCEDURE — 99999 PR PBB SHADOW E&M-EST. PATIENT-LVL IV: ICD-10-PCS | Mod: PBBFAC,,, | Performed by: NURSE PRACTITIONER

## 2019-03-21 PROCEDURE — 99214 OFFICE O/P EST MOD 30 MIN: CPT | Mod: S$GLB,,, | Performed by: NURSE PRACTITIONER

## 2019-03-21 PROCEDURE — 3008F BODY MASS INDEX DOCD: CPT | Mod: CPTII,S$GLB,, | Performed by: NURSE PRACTITIONER

## 2019-03-21 PROCEDURE — 99999 PR PBB SHADOW E&M-EST. PATIENT-LVL IV: CPT | Mod: PBBFAC,,, | Performed by: NURSE PRACTITIONER

## 2019-03-21 RX ORDER — LORATADINE 10 MG/1
10 TABLET ORAL DAILY
Refills: 0 | COMMUNITY
Start: 2019-03-21 | End: 2019-05-14

## 2019-03-21 RX ORDER — MOMETASONE FUROATE 50 UG/1
2 SPRAY, METERED NASAL DAILY
Qty: 17 G | Refills: 1 | Status: SHIPPED | OUTPATIENT
Start: 2019-03-21 | End: 2021-02-04

## 2019-03-21 NOTE — PROGRESS NOTES
This dictation has been generated using Modal Fluency Dictation some phonetic errors may occur. Please contact author for clarification if needed.     Problem List Items Addressed This Visit     None      Visit Diagnoses     Fatigue, unspecified type    -  Primary    Relevant Orders    CBC auto differential    Comprehensive metabolic panel    TSH    Cough        Cold intolerance        Relevant Orders    TSH    Well woman exam without gynecological exam        Relevant Orders    Lipid panel        Orders Placed This Encounter    CBC auto differential    Comprehensive metabolic panel    TSH    Lipid panel    mometasone (NASONEX) 50 mcg/actuation nasal spray    loratadine (CLARITIN) 10 mg tablet     Fatigue cough cold intol. Needs cbc r/o anemia. cmp check electrolytes liver and renal markers. TSH r/o hypothyroid with cold intol and trouble losing weight.   Cough chronic. Continue gerd med. Consider pnd claritin and nasonex. Follow up if not improved. Chest clear and o2 sat 100% no cxr now.     Follow-up if symptoms worsen or fail to improve.    ________________________________________________________________  ________________________________________________________________      Chief Complaint   Patient presents with    Cough     x2 months    Fatigue    Nasal Congestion     History of present illness  This 38 y.o. presents today for complaint of cough, tired, lethargic, post nasal drip, and sore throat. Symptoms present for 2 months. No meds. No improvement. She is active and has continued current step count of 10-12 k steps per day. Non smoker.   Review of Systems   Constitutional: Positive for malaise/fatigue. Negative for chills, fever and weight loss.   HENT: Positive for congestion and sore throat. Negative for ear pain, nosebleeds and sinus pain.    Eyes: Negative for discharge and redness.   Respiratory: Positive for cough, sputum production and shortness of breath. Negative for wheezing and stridor.     Cardiovascular: Negative for chest pain, palpitations, leg swelling and PND.   Gastrointestinal: Positive for heartburn. Negative for abdominal pain, blood in stool, constipation, diarrhea, melena, nausea and vomiting.   Genitourinary: Negative for dysuria and urgency.   Musculoskeletal: Negative for joint pain and myalgias.   Skin: Negative for itching and rash.   Neurological: Negative for weakness.   Endo/Heme/Allergies: Does not bruise/bleed easily.      Reviewed histories. Pt new to me.    Past Medical History:   Diagnosis Date    ADD (attention deficit disorder)     Anxiety     Asthma     Corns and callosities     Depression     Dislocated jaw     GERD (gastroesophageal reflux disease)     Rectal prolapse     Wears glasses        Past Surgical History:   Procedure Laterality Date    CHOLECYSTECTOMY      COLON SURGERY  Jan 2015    Rectal Prolaps    Colonoscopy N/A 10/12/2017    [facility] ([facility]    COLONOSCOPY      prolapse rectum N/A 11/20/2014    [facility]    ESOPHAGOGASTRODUODENOSCOPY (EGD) N/A 10/12/2017    [facility] END[facility] (4TH FLR)    EXPLORATORY-LAPAROTOMY N/A 1/5/2015    [facility] at [facility] OR    MANDIBLE FRACTURE SURGERY      MANOMETRY-ANORECTAL with balloon expulsion test N/A 11/1/2017    Per[facility] ENDO (4TH FLR)    RECTAL PROLAPSE REPAIR, RECTOPEXY      RECTOPEXY N/A 1/5/2015    Per[facility] at [facility] OR       Family History   Problem Relation Age of Onset    Cancer Maternal Grandmother     Cancer Paternal Grandmother     Clotting disorder Neg Hx     Diabetes Neg Hx     Dislocations Neg Hx     Osteoporosis Neg Hx     Psoriasis Neg Hx     Rashes / Skin problems Neg Hx     Severe sprains Neg Hx     Ulcerative colitis Neg Hx     Celiac disease Neg Hx     Cirrhosis Neg Hx     Colon cancer Neg Hx      Colon polyps Neg Hx     Crohn's disease Neg Hx     Cystic fibrosis Neg Hx     Esophageal cancer Neg Hx     Hemochromatosis Neg Hx     Inflammatory bowel disease Neg Hx     Irritable bowel syndrome Neg Hx     Liver cancer Neg Hx     Liver disease Neg Hx     Stomach cancer Neg Hx     Rectal cancer Neg Hx     Lymphoma Neg Hx     Fausto's disease Neg Hx     Tuberculosis Neg Hx     Scleroderma Neg Hx     Multiple sclerosis Neg Hx     Melanoma Neg Hx     Lupus Neg Hx     Rheum arthritis Neg Hx        Social History     Socioeconomic History    Marital status: Single     Spouse name: None    Number of children: None    Years of education: None    Highest education level: None   Social Needs    Financial resource strain: None    Food insecurity - worry: None    Food insecurity - inability: None    Transportation needs - medical: None    Transportation needs - non-medical: None   Occupational History    None   Tobacco Use    Smoking status: Former Smoker     Packs/day: 0.75     Years: 8.00     Pack years: 6.00     Types: Cigarettes     Last attempt to quit: 2006     Years since quittin.3    Smokeless tobacco: Never Used   Substance and Sexual Activity    Alcohol use: Yes     Alcohol/week: 4.2 oz     Types: 7 Glasses of wine per week    Drug use: Yes     Types: Benzodiazepines, Marijuana    Sexual activity: Yes     Birth control/protection: Condom   Other Topics Concern    None   Social History Narrative    None       Current Outpatient Medications   Medication Sig Dispense Refill    albuterol (PROVENTIL) 2.5 mg /3 mL (0.083 %) nebulizer solution Take 3 mLs (2.5 mg total) by nebulization every 4 (four) hours as needed for Wheezing. 1 Box 11    dextroamphetamine-amphetamine 10 mg Tab Take 0.5 tablets by mouth every other day.      fluticasone (FLOVENT HFA) 44 mcg/actuation inhaler Inhale 2 puffs into the lungs 2 (two) times daily. 20 g 5    lorazepam (ATIVAN) 1 MG tablet Take 1  mg by mouth every 6 (six) hours as needed for Anxiety.      pantoprazole (PROTONIX) 40 MG tablet Take 1 tablet (40 mg total) by mouth once daily. 90 tablet 3    polyethylene glycol (GLYCOLAX) 17 gram/dose powder Take 25 g by mouth once daily. 1530 g 5    propranolol (INDERAL) 40 MG tablet Take 1 tablet by mouth once daily.      sodium chloride 0.9% 0.9 % SolP 50 mL with ketamine 100 mg/mL Soln 10 mg/mL Inject 2,267 mcg/kg/min into the vein every 30 days.      vitamin D 1000 units Tab Take 1,000 Units by mouth once daily.       loratadine (CLARITIN) 10 mg tablet Take 1 tablet (10 mg total) by mouth once daily.  0    mometasone (NASONEX) 50 mcg/actuation nasal spray 2 sprays by Nasal route once daily. 17 g 1     No current facility-administered medications for this visit.        Review of patient's allergies indicates:   Allergen Reactions    Iodinated contrast- oral and iv dye Anaphylaxis    Ciprofloxacin Rash       Physical examination  Vitals Reviewed  Gen. Well-dressed well-nourished   Skin warm dry and intact.  No rashes noted.  HEENT.  TM intact bilateral with normal light reflex.  No mastoid tenderness during percussion.  Nares patent bilateral.  Pharynx is unremarkable except pnd.  No maxillary or frontal sinus tenderness when percussed.    Neck is supple without adenopathy  Chest.  Respirations are even unlabored.  Lungs are clear to auscultation.  Cardiac regular rate and rhythm.  No chest wall adenopathy noted.  Abdomen is soft and not distended.  Bowel sounds are present.  No tenderness during palpation of the abdomen.  No Hepatosplenomegaly noted.  No hernia noted.  No CVA tenderness to percussion.    Neuro. Awake alert oriented x4.  Normal judgment and cognition noted.  Extremities no clubbing cyanosis or edema noted.     Call or return to clinic prn if these symptoms worsen or fail to improve as anticipated.

## 2019-03-22 ENCOUNTER — LAB VISIT (OUTPATIENT)
Dept: LAB | Facility: HOSPITAL | Age: 39
End: 2019-03-22
Attending: FAMILY MEDICINE
Payer: COMMERCIAL

## 2019-03-22 DIAGNOSIS — R68.89 COLD INTOLERANCE: ICD-10-CM

## 2019-03-22 DIAGNOSIS — R53.83 FATIGUE, UNSPECIFIED TYPE: ICD-10-CM

## 2019-03-22 DIAGNOSIS — Z00.00 WELL WOMAN EXAM WITHOUT GYNECOLOGICAL EXAM: ICD-10-CM

## 2019-03-22 LAB
ALBUMIN SERPL BCP-MCNC: 4.2 G/DL
ALP SERPL-CCNC: 71 U/L
ALT SERPL W/O P-5'-P-CCNC: 11 U/L
ANION GAP SERPL CALC-SCNC: 9 MMOL/L
AST SERPL-CCNC: 18 U/L
BASOPHILS # BLD AUTO: 0.03 K/UL
BASOPHILS NFR BLD: 0.7 %
BILIRUB SERPL-MCNC: 1.1 MG/DL
BUN SERPL-MCNC: 15 MG/DL
CALCIUM SERPL-MCNC: 9.8 MG/DL
CHLORIDE SERPL-SCNC: 103 MMOL/L
CHOLEST SERPL-MCNC: 180 MG/DL
CHOLEST/HDLC SERPL: 2.1 {RATIO}
CO2 SERPL-SCNC: 26 MMOL/L
CREAT SERPL-MCNC: 0.9 MG/DL
DIFFERENTIAL METHOD: ABNORMAL
EOSINOPHIL # BLD AUTO: 0.1 K/UL
EOSINOPHIL NFR BLD: 2.1 %
ERYTHROCYTE [DISTWIDTH] IN BLOOD BY AUTOMATED COUNT: 13.8 %
EST. GFR  (AFRICAN AMERICAN): >60 ML/MIN/1.73 M^2
EST. GFR  (NON AFRICAN AMERICAN): >60 ML/MIN/1.73 M^2
GLUCOSE SERPL-MCNC: 86 MG/DL
HCT VFR BLD AUTO: 43.7 %
HDLC SERPL-MCNC: 86 MG/DL
HDLC SERPL: 47.8 %
HGB BLD-MCNC: 13.3 G/DL
IMM GRANULOCYTES # BLD AUTO: 0.01 K/UL
IMM GRANULOCYTES NFR BLD AUTO: 0.2 %
LDLC SERPL CALC-MCNC: 82.6 MG/DL
LYMPHOCYTES # BLD AUTO: 2 K/UL
LYMPHOCYTES NFR BLD: 45.3 %
MCH RBC QN AUTO: 30 PG
MCHC RBC AUTO-ENTMCNC: 30.4 G/DL
MCV RBC AUTO: 99 FL
MONOCYTES # BLD AUTO: 0.3 K/UL
MONOCYTES NFR BLD: 7.7 %
NEUTROPHILS # BLD AUTO: 1.9 K/UL
NEUTROPHILS NFR BLD: 44 %
NONHDLC SERPL-MCNC: 94 MG/DL
NRBC BLD-RTO: 0 /100 WBC
PLATELET # BLD AUTO: 279 K/UL
PMV BLD AUTO: 10.6 FL
POTASSIUM SERPL-SCNC: 4.4 MMOL/L
PROT SERPL-MCNC: 7.2 G/DL
RBC # BLD AUTO: 4.43 M/UL
SODIUM SERPL-SCNC: 138 MMOL/L
TRIGL SERPL-MCNC: 57 MG/DL
TSH SERPL DL<=0.005 MIU/L-ACNC: 1.58 UIU/ML
WBC # BLD AUTO: 4.3 K/UL

## 2019-03-22 PROCEDURE — 85025 COMPLETE CBC W/AUTO DIFF WBC: CPT

## 2019-03-22 PROCEDURE — 84443 ASSAY THYROID STIM HORMONE: CPT

## 2019-03-22 PROCEDURE — 36415 COLL VENOUS BLD VENIPUNCTURE: CPT | Mod: PN

## 2019-03-22 PROCEDURE — 80061 LIPID PANEL: CPT

## 2019-03-22 PROCEDURE — 80053 COMPREHEN METABOLIC PANEL: CPT

## 2019-03-25 ENCOUNTER — PATIENT MESSAGE (OUTPATIENT)
Dept: FAMILY MEDICINE | Facility: CLINIC | Age: 39
End: 2019-03-25

## 2019-05-01 ENCOUNTER — PATIENT OUTREACH (OUTPATIENT)
Dept: ADMINISTRATIVE | Facility: HOSPITAL | Age: 39
End: 2019-05-01

## 2019-05-06 ENCOUNTER — PATIENT MESSAGE (OUTPATIENT)
Dept: ADMINISTRATIVE | Facility: HOSPITAL | Age: 39
End: 2019-05-06

## 2019-05-14 ENCOUNTER — OFFICE VISIT (OUTPATIENT)
Dept: OBSTETRICS AND GYNECOLOGY | Facility: CLINIC | Age: 39
End: 2019-05-14
Payer: COMMERCIAL

## 2019-05-14 VITALS
BODY MASS INDEX: 26.34 KG/M2 | SYSTOLIC BLOOD PRESSURE: 106 MMHG | HEIGHT: 64 IN | WEIGHT: 154.31 LBS | DIASTOLIC BLOOD PRESSURE: 74 MMHG

## 2019-05-14 DIAGNOSIS — N94.10 DYSPAREUNIA DUE TO NON-PSYCHOGENIC CAUSE IN FEMALE: ICD-10-CM

## 2019-05-14 DIAGNOSIS — Z01.419 ENCOUNTER FOR CERVICAL PAP SMEAR WITH PELVIC EXAM: Primary | ICD-10-CM

## 2019-05-14 PROCEDURE — 99395 PR PREVENTIVE VISIT,EST,18-39: ICD-10-PCS | Mod: S$GLB,,, | Performed by: OBSTETRICS & GYNECOLOGY

## 2019-05-14 PROCEDURE — 99999 PR PBB SHADOW E&M-EST. PATIENT-LVL III: ICD-10-PCS | Mod: PBBFAC,,, | Performed by: OBSTETRICS & GYNECOLOGY

## 2019-05-14 PROCEDURE — 99395 PREV VISIT EST AGE 18-39: CPT | Mod: S$GLB,,, | Performed by: OBSTETRICS & GYNECOLOGY

## 2019-05-14 PROCEDURE — 87624 HPV HI-RISK TYP POOLED RSLT: CPT

## 2019-05-14 PROCEDURE — 88175 CYTOPATH C/V AUTO FLUID REDO: CPT

## 2019-05-14 PROCEDURE — 99999 PR PBB SHADOW E&M-EST. PATIENT-LVL III: CPT | Mod: PBBFAC,,, | Performed by: OBSTETRICS & GYNECOLOGY

## 2019-05-14 NOTE — PROGRESS NOTES
SUBJECTIVE:   38 y.o. female   for annual routine Pap and checkup. Patient's last menstrual period was 04/15/2019 (exact date)..  She has no unusual complaints.        Past Medical History:   Diagnosis Date    ADD (attention deficit disorder)     Anxiety     Asthma     Corns and callosities     Depression     Dislocated jaw     GERD (gastroesophageal reflux disease)     Rectal prolapse     Wears glasses      Past Surgical History:   Procedure Laterality Date    CHOLECYSTECTOMY      COLON SURGERY  2015    Rectal Prolaps    Colonoscopy N/A 10/12/2017    [facility] END[facility] (4TH FLR)    COLONOSCOPY      prolapse rectum N/A 2014    [facility]    ESOPHAGOGASTRODUODENOSCOPY (EGD) N/A 10/12/2017    [facility] (4[facility] F[facility])    EXPLORATORY-LAPAROTOMY N/A 2015    Performed by [facility] at [facility] OR    MANDIBLE FRACTURE SURGERY      MANOMETRY-ANORECTAL with balloon expulsion test N/A 2017    Performed by [facility] at [facility] ENDO (4TH FLR)    RECTAL PROLAPSE REPAIR, RECTOPEXY      RECTOPEXY N/A 2015    Performed by [facility] Hall [facility] at [facility] OR     Social History     Socioeconomic History    Marital status: Single     Spouse name: Not on file    Number of children: Not on file    Years of education: Not on file    Highest education level: Not on file   Occupational History    Not on file   Social Needs    Financial resource strain: Not on file    Food insecurity:     Worry: Not on file     Inability: Not on file    Transportation needs:     Medical: Not on file     Non-medical: Not on file   Tobacco Use    Smoking status: Former Smoker     Packs/day: 0.75     Years: 8.00     Pack years: 6.00     Types: Cigarettes     Last attempt to quit: 2006     Years since quittin.4    Smokeless tobacco: Never Used   Substance and Sexual Activity    Alcohol use:  Yes     Alcohol/week: 4.2 oz     Types: 7 Glasses of wine per week    Drug use: Yes     Types: Benzodiazepines, Marijuana    Sexual activity: Yes     Birth control/protection: Condom   Lifestyle    Physical activity:     Days per week: Not on file     Minutes per session: Not on file    Stress: Not on file   Relationships    Social connections:     Talks on phone: Not on file     Gets together: Not on file     Attends Religion service: Not on file     Active member of club or organization: Not on file     Attends meetings of clubs or organizations: Not on file     Relationship status: Not on file   Other Topics Concern    Not on file   Social History Narrative    Not on file     Family History   Problem Relation Age of Onset    Cancer Maternal Grandmother     Cancer Paternal Grandmother     Clotting disorder Neg Hx     Diabetes Neg Hx     Dislocations Neg Hx     Osteoporosis Neg Hx     Psoriasis Neg Hx     Rashes / Skin problems Neg Hx     Severe sprains Neg Hx     Ulcerative colitis Neg Hx     Celiac disease Neg Hx     Cirrhosis Neg Hx     Colon cancer Neg Hx     Colon polyps Neg Hx     Crohn's disease Neg Hx     Cystic fibrosis Neg Hx     Esophageal cancer Neg Hx     Hemochromatosis Neg Hx     Inflammatory bowel disease Neg Hx     Irritable bowel syndrome Neg Hx     Liver cancer Neg Hx     Liver disease Neg Hx     Stomach cancer Neg Hx     Rectal cancer Neg Hx     Lymphoma Neg Hx     Fausto's disease Neg Hx     Tuberculosis Neg Hx     Scleroderma Neg Hx     Multiple sclerosis Neg Hx     Melanoma Neg Hx     Lupus Neg Hx     Rheum arthritis Neg Hx      OB History    Para Term  AB Living   0 0 0 0 0 0   SAB TAB Ectopic Multiple Live Births   0 0 0 0 0         Current Outpatient Medications   Medication Sig Dispense Refill    albuterol (PROVENTIL) 2.5 mg /3 mL (0.083 %) nebulizer solution Take 3 mLs (2.5 mg total) by nebulization every 4 (four) hours as needed for  Wheezing. 1 Box 11    cyanocobalamin, vitamin B-12, (VITAMIN B-12) 1,000 mcg/mL Drop Take by mouth.      fluticasone (FLOVENT HFA) 44 mcg/actuation inhaler Inhale 2 puffs into the lungs 2 (two) times daily. 20 g 5    lorazepam (ATIVAN) 1 MG tablet Take 1 mg by mouth every 6 (six) hours as needed for Anxiety.      mometasone (NASONEX) 50 mcg/actuation nasal spray 2 sprays by Nasal route once daily. 17 g 1    pantoprazole (PROTONIX) 40 MG tablet Take 1 tablet (40 mg total) by mouth once daily. 90 tablet 3    polyethylene glycol (GLYCOLAX) 17 gram/dose powder Take 25 g by mouth once daily. 1530 g 5    sodium chloride 0.9% 0.9 % SolP 50 mL with ketamine 100 mg/mL Soln 10 mg/mL Inject 2,267 mcg/kg/min into the vein every 30 days.      vitamin D 1000 units Tab Take 1,000 Units by mouth once daily.        No current facility-administered medications for this visit.      Allergies: Iodinated contrast- oral and iv dye and Ciprofloxacin     ROS:  Constitutional: no weight loss, weight gain, fever, fatigue  Eyes:  No vision changes, glasses/contacts  ENT/Mouth: No ulcers, sinus problems, ears ringing, headache  Cardiovascular: No inability to lie flat, chest pain, exercise intolerance, swelling, heart palpitations  Respiratory: No wheezing, coughing blood, shortness of breath, or cough  Gastrointestinal: No diarrhea, bloody stool, nausea/vomiting, constipation, gas, hemorrhoids  Genitourinary: No blood in urine, painful urination, urgency of urination, frequency of urination, incomplete emptying, incontinence, abnormal bleeding, painful periods, heavy periods, vaginal discharge, vaginal odor, painful intercourse, sexual problems, bleeding after intercourse.  Musculoskeletal: No muscle weakness  Skin/Breast: No painful breasts, nipple discharge, masses, rash, ulcers  Neurological: No passing out, seizures, numbness, headache  Endocrine: No diabetes, hypothyroid, hyperthyroid, hot flashes, hair loss, abnormal hair  "growth, ance  Psychiatric: No depression, crying  Hematologic: No bruises, bleeding, swollen lymph nodes, anemia.      OBJECTIVE:   The patient appears well, alert, oriented x 3, in no distress.  /74 (BP Location: Left arm, Patient Position: Sitting, BP Method: Medium (Manual))   Ht 5' 4" (1.626 m)   Wt 70 kg (154 lb 5.2 oz)   LMP 04/15/2019 (Exact Date)   BMI 26.49 kg/m²   NECK: no thyromegaly, trachea midline  SKIN: no acne, striae, hirsutism  BREAST EXAM: breasts appear normal, no suspicious masses, no skin or nipple changes or axillary nodes  ABDOMEN: soft, non-tender; bowel sounds normal; no masses,  no organomegaly and no hernias, masses, or hepatosplenomegaly  GENITALIA: normal external genitalia, no erythema, no discharge  URETHRA: normal appearing urethra with no masses, tenderness or lesions and normal urethra, normal urethral meatus  VAGINA: Normal  CERVIX: no lesions or cervical motion tenderness  UTERUS: normal size, contour, position, consistency, mobility, non-tender  ADNEXA: normal adnexa    \  ASSESSMENT:   .Cynthia was seen today for well woman.    Diagnoses and all orders for this visit:    Encounter for cervical Pap smear with pelvic exam  -     Liquid-based pap smear, screening  -     HPV High Risk Genotypes, PCR  mmg at 40  Dyspareunia due to non-psychogenic cause in female  -     Ambulatory consult to Physical Therapy  Still having issues with dyspareunia.  More pelvic floor spasm.  Will refer to PT      "

## 2019-05-17 LAB
HPV HR 12 DNA CVX QL NAA+PROBE: NEGATIVE
HPV16 AG SPEC QL: NEGATIVE
HPV18 DNA SPEC QL NAA+PROBE: NEGATIVE

## 2019-06-20 ENCOUNTER — OFFICE VISIT (OUTPATIENT)
Dept: FAMILY MEDICINE | Facility: CLINIC | Age: 39
End: 2019-06-20
Payer: COMMERCIAL

## 2019-06-20 ENCOUNTER — LAB VISIT (OUTPATIENT)
Dept: LAB | Facility: HOSPITAL | Age: 39
End: 2019-06-20
Attending: FAMILY MEDICINE
Payer: COMMERCIAL

## 2019-06-20 VITALS
WEIGHT: 154.44 LBS | TEMPERATURE: 99 F | BODY MASS INDEX: 26.37 KG/M2 | HEART RATE: 62 BPM | DIASTOLIC BLOOD PRESSURE: 62 MMHG | SYSTOLIC BLOOD PRESSURE: 102 MMHG | HEIGHT: 64 IN

## 2019-06-20 DIAGNOSIS — K29.01 ACUTE GASTRITIS WITH HEMORRHAGE, UNSPECIFIED GASTRITIS TYPE: ICD-10-CM

## 2019-06-20 DIAGNOSIS — K29.01 ACUTE GASTRITIS WITH HEMORRHAGE, UNSPECIFIED GASTRITIS TYPE: Primary | ICD-10-CM

## 2019-06-20 LAB
ALBUMIN SERPL BCP-MCNC: 4.5 G/DL (ref 3.5–5.2)
ALP SERPL-CCNC: 71 U/L (ref 55–135)
ALT SERPL W/O P-5'-P-CCNC: 13 U/L (ref 10–44)
ANION GAP SERPL CALC-SCNC: 13 MMOL/L (ref 8–16)
AST SERPL-CCNC: 23 U/L (ref 10–40)
BILIRUB SERPL-MCNC: 0.8 MG/DL (ref 0.1–1)
BUN SERPL-MCNC: 11 MG/DL (ref 6–20)
CALCIUM SERPL-MCNC: 10 MG/DL (ref 8.7–10.5)
CHLORIDE SERPL-SCNC: 106 MMOL/L (ref 95–110)
CO2 SERPL-SCNC: 22 MMOL/L (ref 23–29)
CREAT SERPL-MCNC: 0.8 MG/DL (ref 0.5–1.4)
ERYTHROCYTE [DISTWIDTH] IN BLOOD BY AUTOMATED COUNT: 14.1 % (ref 11.5–14.5)
EST. GFR  (AFRICAN AMERICAN): >60 ML/MIN/1.73 M^2
EST. GFR  (NON AFRICAN AMERICAN): >60 ML/MIN/1.73 M^2
GLUCOSE SERPL-MCNC: 74 MG/DL (ref 70–110)
HCT VFR BLD AUTO: 41.8 % (ref 37–48.5)
HGB BLD-MCNC: 13.5 G/DL (ref 12–16)
MCH RBC QN AUTO: 31.3 PG (ref 27–31)
MCHC RBC AUTO-ENTMCNC: 32.3 G/DL (ref 32–36)
MCV RBC AUTO: 97 FL (ref 82–98)
PLATELET # BLD AUTO: 285 K/UL (ref 150–350)
PMV BLD AUTO: 11.4 FL (ref 9.2–12.9)
POTASSIUM SERPL-SCNC: 3.8 MMOL/L (ref 3.5–5.1)
PROT SERPL-MCNC: 7.5 G/DL (ref 6–8.4)
RBC # BLD AUTO: 4.32 M/UL (ref 4–5.4)
SODIUM SERPL-SCNC: 141 MMOL/L (ref 136–145)
WBC # BLD AUTO: 6.83 K/UL (ref 3.9–12.7)

## 2019-06-20 PROCEDURE — 99999 PR PBB SHADOW E&M-EST. PATIENT-LVL III: ICD-10-PCS | Mod: PBBFAC,,, | Performed by: FAMILY MEDICINE

## 2019-06-20 PROCEDURE — 99999 PR PBB SHADOW E&M-EST. PATIENT-LVL III: CPT | Mod: PBBFAC,,, | Performed by: FAMILY MEDICINE

## 2019-06-20 PROCEDURE — 3008F BODY MASS INDEX DOCD: CPT | Mod: CPTII,S$GLB,, | Performed by: FAMILY MEDICINE

## 2019-06-20 PROCEDURE — 99214 PR OFFICE/OUTPT VISIT, EST, LEVL IV, 30-39 MIN: ICD-10-PCS | Mod: S$GLB,,, | Performed by: FAMILY MEDICINE

## 2019-06-20 PROCEDURE — 85027 COMPLETE CBC AUTOMATED: CPT

## 2019-06-20 PROCEDURE — 36415 COLL VENOUS BLD VENIPUNCTURE: CPT | Mod: PN

## 2019-06-20 PROCEDURE — 86677 HELICOBACTER PYLORI ANTIBODY: CPT

## 2019-06-20 PROCEDURE — 3008F PR BODY MASS INDEX (BMI) DOCUMENTED: ICD-10-PCS | Mod: CPTII,S$GLB,, | Performed by: FAMILY MEDICINE

## 2019-06-20 PROCEDURE — 99214 OFFICE O/P EST MOD 30 MIN: CPT | Mod: S$GLB,,, | Performed by: FAMILY MEDICINE

## 2019-06-20 PROCEDURE — 80053 COMPREHEN METABOLIC PANEL: CPT

## 2019-06-20 RX ORDER — ONDANSETRON 4 MG/1
4 TABLET, ORALLY DISINTEGRATING ORAL EVERY 8 HOURS PRN
Qty: 10 TABLET | Refills: 1 | Status: SHIPPED | OUTPATIENT
Start: 2019-06-20 | End: 2021-02-04

## 2019-06-20 RX ORDER — CALCIUM LACTATE 100 MG
1 TABLET ORAL DAILY
COMMUNITY
End: 2021-02-04

## 2019-06-20 NOTE — PROGRESS NOTES
"Subjective:       Patient ID: Cynthia Dailey is a 38 y.o. female.    Chief Complaint: Gastroesophageal Reflux (Concerned about poss ulcer. Pt has been having alot of "heartburn". 1 episode of vomiting yesterday w/ bright red blood. not feeling well, nausea, no appetite, abd pain)    Starting late last week, she has was having some abdominal pain that would sometimes double her over.  Worse abdominal pain after eating with her early satiety.  More reflux with an acid taste in the back of her throat.  No retrosternal burning.  She vomited yesterday at work with bile and blood and then had some dry heaves.  Today she feels full with no appetite and some persisting nausea.  I reviewed her last EGD from 2017 which was relatively normal other than some gastric mucosa in the distal esophagus.  Her biopsies were negative.  They also look for possible celiac disease. She has had multiple EGDs and ERCPs in the past.  She recalls having peptic ulcer disease approximately 10 years ago.  She has been taking an aspirin every day or 2 or 3 for headache.  She has been taking pantoprazole 40 mg to help control her history of GERD.  Her anxiety level is stable but present.  She takes MiraLax for constipation.  No melena.    Review of Systems   Constitutional: Positive for appetite change and chills. Negative for fever and unexpected weight change.   Respiratory: Negative for shortness of breath.    Cardiovascular: Negative for chest pain and palpitations.   Gastrointestinal: Positive for abdominal distention and abdominal pain. Negative for blood in stool and diarrhea.       Objective:     Blood pressure 102/62, pulse 62, temperature 98.5 °F (36.9 °C), temperature source Oral, height 5' 4" (1.626 m), weight 70.1 kg (154 lb 6.9 oz).      Physical Exam   Constitutional: She appears well-developed and well-nourished. She appears distressed.   Neck: No thyromegaly present.   Cardiovascular: Normal rate and regular rhythm.   No murmur " heard.  Pulmonary/Chest: Effort normal and breath sounds normal. No respiratory distress.   Abdominal: Soft. Bowel sounds are normal. She exhibits no distension. There is tenderness (1+ epigastric and left lower quadrant tenderness. No masses or guarding.).   Lymphadenopathy:     She has no cervical adenopathy.   Skin:   She has some fine papules overlying her proximal right tricep as well as just proximal to the elbow.  She says that it does not itch.  No obvious cause.  She does not know of any unusual contacts in those areas.  She does have a history of allergic rhinitis.  No history of eczema       Assessment:       1. Acute gastritis with hemorrhage, unspecified gastritis type        Plan:       Lab work for CBC, CMP, H pylori.  Double pantoprazole to 40 mg twice a day.  Call back next week for consideration of referral to GI for EGD.  Zofran for nausea

## 2019-06-25 ENCOUNTER — PATIENT MESSAGE (OUTPATIENT)
Dept: FAMILY MEDICINE | Facility: CLINIC | Age: 39
End: 2019-06-25

## 2019-06-25 DIAGNOSIS — K29.91 GASTRITIS AND GASTRODUODENITIS WITH HEMORRHAGE: Primary | ICD-10-CM

## 2019-06-25 DIAGNOSIS — K29.71 GASTRITIS AND GASTRODUODENITIS WITH HEMORRHAGE: Primary | ICD-10-CM

## 2019-06-26 LAB — H PYLORI IGG SERPL QL IA: NEGATIVE

## 2019-07-18 ENCOUNTER — CLINICAL SUPPORT (OUTPATIENT)
Dept: REHABILITATION | Facility: HOSPITAL | Age: 39
End: 2019-07-18
Attending: OBSTETRICS & GYNECOLOGY
Payer: COMMERCIAL

## 2019-07-18 DIAGNOSIS — M62.838 SPASM OF MUSCLE: ICD-10-CM

## 2019-07-18 PROCEDURE — 97110 THERAPEUTIC EXERCISES: CPT | Mod: PO

## 2019-07-18 PROCEDURE — 97162 PT EVAL MOD COMPLEX 30 MIN: CPT | Mod: PO

## 2019-07-18 NOTE — PATIENT INSTRUCTIONS
Home Exercise Program: 07/18/2019    DIAPHRAGMATIC BREATHING     The diaphragm is a dome shaped muscle that forms the floor of the rib cage. It is the most efficient muscle for breathing and relaxation, although most people are not used to using the diaphragm. Diaphragmatic or belly breathing is an important technique to learn because it helps settle down or relax the autonomic nervous system. The correct use of diaphragmatic breathing can help to quiet brain activity resulting in the relaxation of all the muscles and organs of the body. This is accomplished by slow rhythmic breathing concentrated in the diaphragm muscle rather than the chest.    How to do proper relaxation breathing:     Start by lying on your back or reclining in a chair in a relaxed position. Place one hand on your chest and the other on your abdomen.   Relax your jaw by placing your tongue on the roof of your mouth and keeping your teeth slightly apart.    Take a deep breath in, letting the abdomen expand and rise while you keep your upper chest, neck and shoulders relaxed.    As you breathe out, allow your abdomen and chest to fall. Exhale completely.   It doesn't matter if you breathe in/out through your nose and/or mouth. Do whichever feels comfortable.   Remember to breathe slowly.  Do not force your breathing. Do not hold your breath.   Repeat for 5 minutes every day.

## 2019-07-18 NOTE — PLAN OF CARE
Patient: Cynthia Dailey   [facility] #:  [id number]    Date of treatment: 7/18/2019  Time in: 11:00  Time out: 12:00    Cynthia is a 39 y.o. female evaluated on 7/18/2019    Physician:  Dr. Jacob Patel  Diagnosis:   Spasm of muscle    Treatment ordered: PT    Medical History:   Past Medical History   Diagnosis Date    Corns and callosities     Depression     Anxiety     GERD (gastroesophageal reflux disease)     ADD (attention deficit disorder)     Asthma     Rectal prolapse     Wears glasses         Surgical History:   Past Surgical History   Procedure Laterality Date    Cholecystectomy      Mandible fracture surgery      Colon surgery  Jan 2015     Rectal Prolaps        Medications:   Current Outpatient Prescriptions   Medication Sig    ADDERALL 30 mg Tab Take 30 mg by mouth 5 (five) times daily.     albuterol (PROVENTIL) 2.5 mg /3 mL (0.083 %) nebulizer solution Take 3 mLs (2.5 mg total) by nebulization every 4 (four) hours as needed for Wheezing.    b complex vitamins capsule Take 1 capsule by mouth once daily.    beclomethasone (QVAR) 40 mcg/actuation Aero Inhale 2 puffs into the lungs once daily.    cyanocobalamin, vitamin B-12, (VITAMIN B-12) 50 mcg tablet Take 50 mcg by mouth once daily.    LACTOBACILLUS COMBO NO.6 (PROBIOTIC COMPLEX ORAL) Take by mouth 2 (two) times daily.    omeprazole (PRILOSEC) 40 MG capsule Take 1 capsule by mouth  once a day    vitamin D 1000 units Tab Take 185 mg by mouth once daily.     No current facility-administered medications for this visit.       Allergies:   Allergies   Allergen Reactions    Iodinated Contrast Media - Iv Dye Anaphylaxis    Ciprofloxacin Rash      Precautions: universal  OB:GYN History:G1; regular periods; cannot wear tampons comfortably; painful intercourse.        Bladder/Bowel History: trouble emptying bladder completely, recurrent bladder infections, constipation/straining for movement and straining or pushing to empty bladder       SUBJECTIVE:  "  History of current complaint: pt reports that she has been told that she has "nerve damage" after her rectal prolapse surgery.  She is in tears and is telling me that she doesn't understand what is going on.  She reports that she has pain with intercourse and she cannot climax.  This has been an issue since her surgery.  Lubrication is also an issue.  Some initial penetration pain but most pain is with deep penetration.  She can use small tampons.   She continues to have constipation issues.  She is now taking daily Miralax and is having BM's every 2-7 days.      Date of Onset: Jan 2015  Symptoms are: unchanged    Frequency of Urination: Daytime: every 2-5 hours           Nighttime: 1    Urine Stream: strong; sometimes slow    Frequency of Bowel Movements: every 2-7 days    Types of Fluid Intake: water, iced tea,       Bladder Leakage: less than once per month.  Leaks with cough/sneeze at times    Current Exercise: still in school for engineering; works full time; walks the dog 3 miles per day.       Pain:  Patient reports 1/10 with 0 being the lowest and 10 being the highest.  "I walk around with pain."  Last attempt at intercourse 8-9/10.      OBJECTIVE:  Patient received 50 minutes of treatment which consisted of evaluation and 10 minutes of ther ex.    ORTHO SCREEN  Posture: WNL      Pelvic Alignment: no deviations noted in supine       ABDOMINALS  Scarring:  Martita scar RUQ      Diastasis: none   Abdominal strength: rectus 3/5     Transverse good, palpable, isolated TA contraction         VAGINAL PELVIC FLOOR EXAM     EXTERNAL ASSESSMENT  Introitus: WNL  Skin condition: WNL  Scarring: none   Sensation: WNL   Pain: pain noted with movement of the hymen at the 3 o'clock position- minimal burning noted here.    Voluntary contraction: visible lift  Voluntary relaxation: visible drop  Involuntary contraction: bulge  Bearing down: bulge, mostly at the perineal body                              INTERNAL " ASSESSMENT  Pain: tender areas noted as follows: B OI's not tender; R levators more painful with pressure than L.  Noted pain with L levator contraction but could not pinpoint if the pain was more posterior or anterior  Sensation: able to localize pressure appropriately  Vaginal vault: posterior vaginal shelf noted  Muscle Bulk: hypertonus   Muscle Power: 2/5                 Quality of contraction: decreased hold and incomplete relaxation   Specificity: WNL  Coordination: tends to hold breath during PFM contraction       SEMG Evaluation: deferred due to time constraints    Treatment Given: instructed on general anatomy/physiology of urinary/bowel system; discussed plan of care with patient; instructed in benefits/risks of treatment; instructed in alternative methods of treatment; instructed in risks of refusing treatment; patient agreed to treatment plan; instructed in diaphragmatic breathing;  We spoke about a dilator set- she had been issued one from Dr. Patel.  I instructed her to bring it next session for instruction.  Pt agreed with this plan.        ASSESSMENT:    Problem List:    poor knowledge of body mechanics and posture, poor trunk stability, pelvic floor tenderness, decreased pelvic muscle strength, decreased phasic ability of the pelvicmuscles, increased tension of the pelvic muscles, poor quality of pelvic muscle contraction and dysfunctional defecation    Barriers to Learning: none    Educational/Spiritual/Cultural needs:  None  Environmental Barriers: none noted  Abuse/Neglect: no signs  Nutritional Status: WDWN WF  Fall Risk: none    FUNCTIONAL GOALS: 3 months  1. Able to maintain normal breathing pattern during pelvic floor muscle contraction.,   2. Pt to be I with use of vaginal dilator set at home for management of pelvic pain.    3. Pt to be able to use tampons with less pain.    4. Pt to be able to sit for longer periods without increase in pain.,   5. Pt to report decreased pain associated with  urination or defecation.,   6. Pt to be I with HEP.       PATIENTS GOALS: to be able to have intercourse with less pain.      PLAN:  PT Manual therapy: Myofascial release, soft tissue mobilization, joint mobs, stretching, Kegels, biofeedback, electrical stimulation and therapeutic exercises    Physical Therapy Education: anatomy/physiology of pelvic floor, posture/body mechanics and diaphragmatic breathing    Frequency/Duration: once per 1-2 weeks for 3 months

## 2019-08-08 ENCOUNTER — CLINICAL SUPPORT (OUTPATIENT)
Dept: REHABILITATION | Facility: HOSPITAL | Age: 39
End: 2019-08-08
Attending: OBSTETRICS & GYNECOLOGY
Payer: COMMERCIAL

## 2019-08-08 DIAGNOSIS — M62.838 SPASM OF MUSCLE: ICD-10-CM

## 2019-08-08 PROCEDURE — 97112 NEUROMUSCULAR REEDUCATION: CPT | Mod: PO

## 2019-08-08 NOTE — PROGRESS NOTES
OUTPATIENT PHYSICAL THERAPY DAILY NOTE       Patient: Cynthia Dialey   [facility] #:  [id number]    Diagnosis:   Encounter Diagnosis   Name Primary?    Spasm of muscle       Date of treatment: 08/08/2019     Time in: 9:37  Time out: 10:20  Billable time: 40 minutes  Visit #: 2  Auth: 20 exp 12/31  POC expiration: 10/18/2019    SUBJECTIVE   Pt reports: lots of life stress- selling one house and buying another.  Comes with dilator set ready to work.    Pain: pt did not quantify pain today  Pain location: NA    OBJECTIVE     Neuromuscular Re-education to promote pelvic muscle downtraining and desensitization for 40 minutes including:  passive stretching of the muscles of the levator ani, and desensitization of the tissues of the vaginal introitus via dilator set.  She was instructed in care and cleaning of dilator set, and in proper posture and body mechanics.  She was noted to be fairly tender in the levators on both sides, and was able to perform contract/relax technique, along with connective tissue mobilization with an appropriate level of discomfort.  Size 2 and 3 were used with minimal difficulty.  Handout was issued.      Education: Discussed progression of plan of care with patient; educated pt in activity modification; reviewed HEP with pt. Pt demonstrated and verbalized understanding of all instruction and was provided with a handout of HEP (see Patient Instructions).      ASSESSMENT      Pt tolerated entire treatment well with no visible adverse effects. Will reassess in a month to see how pain is improving and where we need to work.  She spoke of increased fecal urgency with vaginal pressure, which has increased since her prolapse surgery.  Will hopefully be able to use the dilator set often enough.   Will the patient continue to benefit from skilled PT intervention? Yes  Medical necessity demonstrated by: Skilled PT supervision required for HEP and treatment progression  Progress towards goals:  fair  New/Revised  goals: none      PLAN     Continue with established Plan of Care, working toward established PT goals.

## 2019-08-08 NOTE — PATIENT INSTRUCTIONS
Home Exercise Program: 08/08/2019    DILATOR TRAINING  1. Make sure the dilator is clean, free of any visible soiling.   2. Apply water-based lubricant (ie. Slippery stuff or KY) to the dilator and the vaginal opening.   3. Either sit on the toilet and lean back on the tank OR lay back with your back well supported by several pillows and both knees bent.   4. Spread labia and insert the dilator.    With the smaller (size 2) size:  5. Apply downward pressure onto the levator ani group of muscles on one side.     To find the levator ani muscles:   - In the middle at 6 o'clock = Rectum   - Move a little to the right at 4 o'clock = Right levator ani group   - Move a little to the left at 8 o'clock = Left levator ani group   - If you feel sharp, stabbing pain = bone or pudendal nerve (wrong)  6. Begin Diaphragmatic Breathing and drop your pelvic floor muscle (releasing the tension), following the encouragement of the dilator. (can use either just pressure, or contract/relax as shown in clinic)  7. Repeat on the levator ani group of muscles on the other side.  8. Continue for 5-10 minutes.    With the size 3 size:  9. Insert the dilator until it begins to get moderately uncomfortable. Stop at this point.  10. Begin Diaphragmatic Breathing and focus on DROPPING the pelvic floor muscles, releasing their tension.   11. Once the discomfort has dissipated, insert the dilator a little bit more, and repeat this process.  12. You can also move the dilator slightly side/side or forward/backward to get the tissues use to being mobilized.   13. Continue for 5 minutes.     14. Once finished, remove dilator.  15. Wash with anti-bacterial soap and thoroughly rinse. Let air dry whenever possible. Store in a dry, clean location.   16. Repeat 5 of the 7 days per week. (every other day)    *Don't apply so much pressure that it leaves you too sore to perform again the next day.    STOP if there is increased bleeding, an infection, or  extreme pain.

## 2019-09-05 ENCOUNTER — CLINICAL SUPPORT (OUTPATIENT)
Dept: REHABILITATION | Facility: HOSPITAL | Age: 39
End: 2019-09-05
Attending: OBSTETRICS & GYNECOLOGY
Payer: COMMERCIAL

## 2019-09-05 DIAGNOSIS — M62.838 SPASM OF MUSCLE: ICD-10-CM

## 2019-09-05 PROCEDURE — 97112 NEUROMUSCULAR REEDUCATION: CPT | Mod: PO

## 2019-09-05 NOTE — PROGRESS NOTES
OUTPATIENT PHYSICAL THERAPY DAILY NOTE       Patient: Cynthia Dailey   [facility] #:  [id number]    Diagnosis:   Encounter Diagnosis   Name Primary?    Spasm of muscle       Date of treatment: 09/05/2019     Time in: 9:30  Time out: 10:15  Billable time: 45 minutes  Visit #: 3  Auth: 20 exp 12/31  POC expiration: 10/18/2019    SUBJECTIVE   Pt reports:pt reports that she is closing on both houses on Monday. Has only been using her dilator set twice per week.  Still using 2 and 3.    Pain: 2/10 with insertion of the first dilator.  Pain location: vaginal cuff    OBJECTIVE     Neuromuscular Re-education to promote pelvic muscle downtraining and desensitization for 40 minutes including:  passive stretching of the muscles of the levator ani, and desensitization of the tissues of the vaginal introitus via dilator set.  Today's work was with SEMG assist.  Her baseline resting activity prior to dilator work was about 6-7uV on average.  She was able to I'ly insert the dilators fully, and perform contract/relax technique. She was noted to derecruit quite well with contract/relax, but noted no sensation of dropping or lifting.  At one point she stated that she bore down.  After dilator work, her lift/drop was reassessed manually- she was noted to lift and drop well, with lower baseline resting activity with exam finger on the levators, rather than the dilator.  I instructed her to stick with the size 2 and 3, and that we would look to progress to a longer larger dilator next session.  Pt verbalized understanding.        Education: Discussed progression of plan of care with patient; educated pt in activity modification; reviewed HEP with pt. Pt demonstrated and verbalized understanding of all instruction.    ASSESSMENT      Pt tolerated entire treatment well with no visible adverse effects.She now knows what the short term goal is regarding her dilator work.  Hopefully more easily relaxed pelvic floor muscles will make deeper dilator  work more effective.  May do a rectal exam at one point to assess connective tissue mobility s/p rectopexy.  Will the patient continue to benefit from skilled PT intervention? Yes  Medical necessity demonstrated by: Skilled PT supervision required for HEP and treatment progression  Progress towards goals:  fair  New/Revised goals: none      PLAN     Continue with established Plan of Care, working toward established PT goals.

## 2019-10-03 ENCOUNTER — CLINICAL SUPPORT (OUTPATIENT)
Dept: REHABILITATION | Facility: HOSPITAL | Age: 39
End: 2019-10-03
Attending: OBSTETRICS & GYNECOLOGY
Payer: COMMERCIAL

## 2019-10-03 DIAGNOSIS — M62.838 SPASM OF MUSCLE: ICD-10-CM

## 2019-10-03 PROCEDURE — 97112 NEUROMUSCULAR REEDUCATION: CPT | Mod: PO

## 2019-10-03 NOTE — PATIENT INSTRUCTIONS
"10/3/2019    Continue using the dilator set every other day.  Sizes 3 and 4.  Don't be afraid to try 5.      Be careful with the Obturator Internus- that twitchy muscle that rotates your hip.  Adjust your hip position for comfort as needed.  This also goes for intercourse as well.  Place pillows accordingly.  Use dilator time to figure out how strategically placed pillows can help.      Use a session of dilator work as "warm up" prior to intercourse, and note how it changes the experience.    "

## 2019-10-03 NOTE — PROGRESS NOTES
OUTPATIENT PHYSICAL THERAPY DAILY NOTE       Patient: Cynthia Dailey   [facility] #:  [id number]    Diagnosis:   Encounter Diagnosis   Name Primary?    Spasm of muscle       Date of treatment: 10/03/2019     Time in: 9:35  Time out: 10:20  Billable time: 45 minutes  Visit #: 4  Auth: 20 exp 12/31  POC expiration: 10/18/2019    SUBJECTIVE   Pt reports: pt reports that she is using her dilators about 2-3 days per week.   Still pain with deep penetration.    Pain: did not quantify but reported no pain with insertion of size 2 and 3.    Pain location: vaginal cuff    OBJECTIVE     Neuromuscular Re-education to promote pelvic muscle downtraining and desensitization for 45 minutes including:  passive stretching of the muscles of the levator ani, and desensitization of the tissues of the vaginal introitus via dilator set.  Today's work was with SEMG assist.  Her baseline resting activity prior to dilator work was about 5uV on average.  She was able to I'ly insert the dilators fully, and apply ischemic pressure. She was able to insert the size 4 dilator with minimal difficulty.  She noted discomfort on the R side- with her verbal consent I assessed and was able to palpate a spasming R OI- I educated her on where the ischial spine and pudendal nerve are, and what to avoid when applying ischemic pressure.  Please refer to pt. Instructions for revised home program. Pt verbalized understanding.        Education: Discussed progression of plan of care with patient; educated pt in activity modification; reviewed HEP with pt. Pt demonstrated and verbalized understanding of all instruction.    ASSESSMENT      Pt with decreased muscle activity today on SEMG with dilator use, compared with last session.  Will hopefully be able to progress herself as directed.    Will the patient continue to benefit from skilled PT intervention? Yes  Medical necessity demonstrated by: Skilled PT supervision required for HEP and treatment progression  Progress  towards goals:  fair  New/Revised goals: none      PLAN     Continue with established Plan of Care, working toward established PT goals.

## 2019-11-15 ENCOUNTER — CLINICAL SUPPORT (OUTPATIENT)
Dept: REHABILITATION | Facility: HOSPITAL | Age: 39
End: 2019-11-15
Attending: OBSTETRICS & GYNECOLOGY
Payer: COMMERCIAL

## 2019-11-15 DIAGNOSIS — M62.838 SPASM OF MUSCLE: ICD-10-CM

## 2019-11-15 PROCEDURE — 97110 THERAPEUTIC EXERCISES: CPT | Mod: PO

## 2019-11-15 NOTE — PROGRESS NOTES
OUTPATIENT PHYSICAL THERAPY PLAN OF CARE REASSESSMENT       Patient: Cynthia Dailey   [facility] #:  [id number]    Diagnosis:   Encounter Diagnosis   Name Primary?    Spasm of muscle       Date of treatment: 11/15/2019     Time in: 9:11  Time out: 10:05  Billable time: 54 minutes  Visit #: 5  Auth: 20 exp 12/31  POC expiration: 10/18/2019    SUBJECTIVE   Pt reports:  That she has been thinking about the painful hip rotator muscle that we found last session.  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  She also notes that she is now running, and running feels better than walking.  She notes that any deep penetration still hurts, but using the dilator prior to intercourse helped with the pain during and after.    Pain: did not quantify  Pain location: NA    OBJECTIVE     Pt with positive L hip scour; audible click noted in the area of the obturator internus/deep hip joint during terminal stance phase of gait; no significant alignment issues other than slight sacral L rotation in prone. L OI tender to palpate externally in SL.      Therapeutic Exercise:  to promote pelvic stability and strength for 54 minutes including: bridging, unilateral bridge, SL clam, prone leg ext, dead lift, standing hip ext/abd/add with orange theraband, resisted sidestepping with tb.  We also spoke about running with shorter stride length, and on soft surfaces with cushioned shoes.  I advised her that an audible, palpable click in her hip is a reason to return to her orthopedist as she may have a labral tear.  She reported difficulty returning to PT due to finances (high deductible which hasn't been met).  I advised her to contact me via the portal for advice after 2-3 weeks of home exercise.   Pt verbalized understanding.        Education: Discussed progression of plan of care with patient; educated pt in activity  modification; reviewed HEP with pt. Pt demonstrated and verbalized understanding of all instruction.    ASSESSMENT      Pt with hip pathology that is just now coming to light, which may have bearing on why she's having pain with deep vaginal penetration.  Feel that dilators are helping, but needs more PT intervention from an orthopedic standpoint.      Will the patient continue to benefit from skilled PT intervention? Yes  Medical necessity demonstrated by: Skilled PT supervision required for HEP and treatment progression  Progress towards goals:  Fair/good  New/Revised goals:   1. Able to maintain normal breathing pattern during pelvic floor muscle contraction., MET  2. Pt to be I with use of vaginal dilator set at home for management of pelvic pain.  MET  3. Pt to be able to use tampons with less pain.  NOT MET  4. Pt to be able to sit for longer periods without increase in pain.,MET   5. Pt to report decreased pain associated with urination or defecation., MET  6. Pt to be I with HEP. MET    PLAN     Will hold PT for now and await contact from patient- will establish goals if she wants to continue.

## 2019-11-27 RX ORDER — POLYETHYLENE GLYCOL 3350 17 G/17G
POWDER, FOR SOLUTION ORAL
Qty: 1428 G | Refills: 3 | Status: SHIPPED | OUTPATIENT
Start: 2019-11-27 | End: 2020-08-04

## 2019-11-27 RX ORDER — PANTOPRAZOLE SODIUM 40 MG/1
TABLET, DELAYED RELEASE ORAL
Qty: 90 TABLET | Refills: 3 | Status: SHIPPED | OUTPATIENT
Start: 2019-11-27 | End: 2020-10-27

## 2019-12-09 ENCOUNTER — PATIENT MESSAGE (OUTPATIENT)
Dept: FAMILY MEDICINE | Facility: CLINIC | Age: 39
End: 2019-12-09

## 2019-12-09 DIAGNOSIS — L30.9 FACIAL DERMATITIS: Primary | ICD-10-CM

## 2019-12-10 ENCOUNTER — OFFICE VISIT (OUTPATIENT)
Dept: INTERNAL MEDICINE | Facility: CLINIC | Age: 39
End: 2019-12-10
Payer: COMMERCIAL

## 2019-12-10 VITALS
HEIGHT: 64 IN | HEART RATE: 42 BPM | WEIGHT: 138.44 LBS | DIASTOLIC BLOOD PRESSURE: 56 MMHG | SYSTOLIC BLOOD PRESSURE: 92 MMHG | BODY MASS INDEX: 23.64 KG/M2 | TEMPERATURE: 98 F

## 2019-12-10 DIAGNOSIS — G43.009 MIGRAINE WITHOUT AURA AND WITHOUT STATUS MIGRAINOSUS, NOT INTRACTABLE: Primary | ICD-10-CM

## 2019-12-10 PROCEDURE — 99999 PR PBB SHADOW E&M-EST. PATIENT-LVL III: CPT | Mod: PBBFAC,,, | Performed by: NURSE PRACTITIONER

## 2019-12-10 PROCEDURE — 99999 PR PBB SHADOW E&M-EST. PATIENT-LVL III: ICD-10-PCS | Mod: PBBFAC,,, | Performed by: NURSE PRACTITIONER

## 2019-12-10 PROCEDURE — 99214 OFFICE O/P EST MOD 30 MIN: CPT | Mod: S$GLB,,, | Performed by: NURSE PRACTITIONER

## 2019-12-10 PROCEDURE — 99214 PR OFFICE/OUTPT VISIT, EST, LEVL IV, 30-39 MIN: ICD-10-PCS | Mod: S$GLB,,, | Performed by: NURSE PRACTITIONER

## 2019-12-10 RX ORDER — BUTALBITAL, ACETAMINOPHEN AND CAFFEINE 50; 325; 40 MG/1; MG/1; MG/1
1 TABLET ORAL EVERY 4 HOURS PRN
Qty: 30 TABLET | Refills: 0 | Status: SHIPPED | OUTPATIENT
Start: 2019-12-10 | End: 2020-01-09

## 2019-12-10 RX ORDER — ONDANSETRON 4 MG/1
4 TABLET, FILM COATED ORAL 2 TIMES DAILY
Qty: 30 TABLET | Refills: 0 | Status: SHIPPED | OUTPATIENT
Start: 2019-12-10 | End: 2021-02-04

## 2019-12-10 NOTE — PROGRESS NOTES
[facility]    Chief Complaint      Chief Complaint   Patient presents with    Headache     History of Present Illness      Cynthia Dailey is a 39 y.o. female former patient of Dr. Saldana' with chronic conditions of ADD, anxiety/depression, asthma, GERD, s/p avascular necrosis of hips, former smoker who is new to me and presents today for c/o migraine x 10 days, is having weird smells, nausea, chills, decreased appetite, sensitive to noise and light, dizziness, weakness and sees spots in right eye at times. Pt reports does get HA and migraines. Denies fever, sob, cp, vomiting.   Flu vaccine- declines, pt is vegan, jogs for exercise.    Problem List Items Addressed This Visit     None      Visit Diagnoses     Migraine without aura and without status migrainosus, not intractable    -  Primary          Health Maintenance   Topic Date Due    Pap Smear with HPV Cotest  2022    TETANUS VACCINE  2029    Lipid Panel  Completed       Past Medical History:   Diagnosis Date    ADD (attention deficit disorder)     Anxiety     Asthma     Corns and callosities     Depression     Dislocated jaw     GERD (gastroesophageal reflux disease)     Rectal prolapse     Wears glasses        Past Surgical History:   Procedure Laterality Date    CHOLECYSTECTOMY      COLON SURGERY  2015    Rectal Prolaps    COLONOSCOPY N/A 10/12/2017    Procedure: Colonoscopy;  Surgeon: Lucy Cortes MD;  Location: [facility] ([address] [place] [address]);  Service: Endoscopy;  Laterality: N/A;    MANDIBLE FRACTURE SURGERY      RECTAL PROLAPSE REPAIR, RECTOPEXY         family history includes Cancer in her maternal grandmother and paternal grandmother.    Social History     Tobacco Use    Smoking status: Former Smoker     Packs/day: 0.75     Years: 8.00     Pack years: 6.00     Types: Cigarettes     Last attempt to quit: 2006     Years since quittin.0    Smokeless tobacco: Never Used   Substance Use Topics     Alcohol use: Yes     Alcohol/week: 7.0 standard drinks     Types: 7 Glasses of wine per week    Drug use: Yes     Types: Benzodiazepines, Marijuana       Review of Systems   Constitutional: Positive for chills. Negative for fever.   HENT: Negative for congestion, sinus pain and sore throat.    Eyes: Negative for blurred vision.   Respiratory: Negative for shortness of breath.    Cardiovascular: Negative for chest pain and palpitations.   Gastrointestinal: Positive for nausea. Negative for diarrhea and vomiting.   Genitourinary: Negative for dysuria.   Musculoskeletal: Negative for myalgias.   Skin: Negative for rash.   Neurological: Positive for dizziness, weakness and headaches.   Psychiatric/Behavioral: Negative for depression. The patient is not nervous/anxious.         Outpatient Encounter Medications as of 12/10/2019   Medication Sig Dispense Refill    albuterol (PROVENTIL) 2.5 mg /3 mL (0.083 %) nebulizer solution Take 3 mLs (2.5 mg total) by nebulization every 4 (four) hours as needed for Wheezing. 1 Box 11    cyanocobalamin, vitamin B-12, (VITAMIN B-12) 1,000 mcg/mL Drop Take by mouth.      GAVILAX 17 gram/dose powder MIX AND DRINK 25 GRAMS ONCE DAILY 1428 g 3    lorazepam (ATIVAN) 1 MG tablet Take 1 mg by mouth every 6 (six) hours as needed for Anxiety.      mometasone (NASONEX) 50 mcg/actuation nasal spray 2 sprays by Nasal route once daily. 17 g 1    ondansetron (ZOFRAN-ODT) 4 MG TbDL Take 1 tablet (4 mg total) by mouth every 8 (eight) hours as needed. 10 tablet 1    pantoprazole (PROTONIX) 40 MG tablet TAKE 1 TABLET DAILY 90 tablet 3    sodium chloride 0.9% 0.9 % SolP 50 mL with ketamine 100 mg/mL Soln 10 mg/mL Inject 2,267 mcg/kg/min into the vein every 30 days.      vitamin a palmitate-B-carotene 25,000 unit (15K-10K unit) Tab Take 1 tablet by mouth once daily.      vitamin D 1000 units Tab Take 1,000 Units by mouth once daily.       butalbital-acetaminophen-caffeine -40 mg (FIORICET,  "ESGIC) -40 mg per tablet Take 1 tablet by mouth every 4 (four) hours as needed for Pain. 30 tablet 0    fluticasone (FLOVENT HFA) 44 mcg/actuation inhaler Inhale 2 puffs into the lungs 2 (two) times daily. 20 g 5    ondansetron (ZOFRAN) 4 MG tablet Take 1 tablet (4 mg total) by mouth 2 (two) times daily. 30 tablet 0     No facility-administered encounter medications on file as of 12/10/2019.         Review of patient's allergies indicates:   Allergen Reactions    Iodinated contrast media Anaphylaxis    Ciprofloxacin Rash       Physical Exam      Vital Signs  Temp: 98.1 °F (36.7 °C)  Temp src: Oral  Pulse: (!) 42  BP: (!) 92/56  BP Location: Left arm  Patient Position: Sitting  Pain Score:   6  Pain Loc: Head  Height and Weight  Height: 5' 4" (162.6 cm)  Weight: 62.8 kg (138 lb 7.2 oz)  BSA (Calculated - sq m): 1.68 sq meters  BMI (Calculated): 23.8  Weight in (lb) to have BMI = 25: 145.3]    Physical Exam   Constitutional: She is oriented to person, place, and time. She appears well-developed and well-nourished.   HENT:   Head: Normocephalic and atraumatic.   Right Ear: External ear normal.   Left Ear: External ear normal.   Mouth/Throat: Oropharynx is clear and moist.   Eyes: Pupils are equal, round, and reactive to light. Conjunctivae and EOM are normal.   Neck: Normal range of motion. Neck supple.   Cardiovascular: Normal rate, regular rhythm, normal heart sounds and intact distal pulses.   No murmur heard.  Pulmonary/Chest: Effort normal and breath sounds normal. She exhibits no tenderness.   Abdominal: Soft.   Musculoskeletal: Normal range of motion.   Neurological: She is alert and oriented to person, place, and time. Coordination normal.   Skin: Skin is warm and dry.   Psychiatric: She has a normal mood and affect. Her behavior is normal. Judgment and thought content normal.   Nursing note and vitals reviewed.       Laboratory:  CBC:  No results for input(s): WBC, RBC, HGB, HCT, PLT, MCV, MCH, MCHC " in the last 2160 hours.  CMP:  No results for input(s): GLU, CALCIUM, ALBUMIN, PROT, NA, K, CO2, CL, BUN, ALKPHOS, ALT, AST, BILITOT in the last 2160 hours.    Invalid input(s): CREATININ  URINALYSIS:  No results for input(s): COLORU, CLARITYU, SPECGRAV, PHUR, PROTEINUA, GLUCOSEU, BILIRUBINCON, BLOODU, WBCU, RBCU, BACTERIA, MUCUS, NITRITE, LEUKOCYTESUR, UROBILINOGEN, HYALINECASTS in the last 2160 hours.   LIPIDS:  No results for input(s): TSH, HDL, CHOL, TRIG, LDLCALC, CHOLHDL, NONHDLCHOL, TOTALCHOLEST in the last 2160 hours.  TSH:  No results for input(s): TSH in the last 2160 hours.  A1C:  No results for input(s): HGBA1C in the last 2160 hours.      Assessment/Plan     Cynthia Dailey is a 39 y.o.female with:    1. Migraine without aura and without status migrainosus, not intractable    -Continue current medications and maintain follow up with specialists.  Return to clinic as needed for any concerns, pt instructed to call       Erin Jiminez, NP-C [facility]

## 2019-12-19 ENCOUNTER — PATIENT MESSAGE (OUTPATIENT)
Dept: FAMILY MEDICINE | Facility: CLINIC | Age: 39
End: 2019-12-19

## 2019-12-19 ENCOUNTER — PATIENT MESSAGE (OUTPATIENT)
Dept: INTERNAL MEDICINE | Facility: CLINIC | Age: 39
End: 2019-12-19

## 2020-02-03 ENCOUNTER — OFFICE VISIT (OUTPATIENT)
Dept: INTERNAL MEDICINE | Facility: CLINIC | Age: 40
End: 2020-02-03
Payer: COMMERCIAL

## 2020-02-03 VITALS
WEIGHT: 134.5 LBS | TEMPERATURE: 98 F | HEIGHT: 64 IN | SYSTOLIC BLOOD PRESSURE: 110 MMHG | HEART RATE: 60 BPM | RESPIRATION RATE: 18 BRPM | BODY MASS INDEX: 22.96 KG/M2 | DIASTOLIC BLOOD PRESSURE: 72 MMHG

## 2020-02-03 DIAGNOSIS — M62.838 SPASM OF MUSCLE: ICD-10-CM

## 2020-02-03 DIAGNOSIS — L71.0 PERIORAL DERMATITIS: ICD-10-CM

## 2020-02-03 DIAGNOSIS — R53.83 FATIGUE, UNSPECIFIED TYPE: ICD-10-CM

## 2020-02-03 DIAGNOSIS — S03.00XS DISLOCATION OF TEMPOROMANDIBULAR JOINT, SEQUELA: ICD-10-CM

## 2020-02-03 DIAGNOSIS — K21.9 GASTROESOPHAGEAL REFLUX DISEASE, ESOPHAGITIS PRESENCE NOT SPECIFIED: ICD-10-CM

## 2020-02-03 DIAGNOSIS — F41.9 ANXIETY: ICD-10-CM

## 2020-02-03 DIAGNOSIS — F98.8 ATTENTION DEFICIT DISORDER, UNSPECIFIED HYPERACTIVITY PRESENCE: ICD-10-CM

## 2020-02-03 DIAGNOSIS — Z00.00 WELL ADULT EXAM: Primary | ICD-10-CM

## 2020-02-03 DIAGNOSIS — J45.20 MILD INTERMITTENT CHRONIC ASTHMA WITHOUT COMPLICATION: ICD-10-CM

## 2020-02-03 PROBLEM — S03.00XA TMJ (DISLOCATION OF TEMPOROMANDIBULAR JOINT): Status: ACTIVE | Noted: 2020-02-03

## 2020-02-03 PROCEDURE — 99395 PREV VISIT EST AGE 18-39: CPT | Mod: S$GLB,,, | Performed by: FAMILY MEDICINE

## 2020-02-03 PROCEDURE — 99999 PR PBB SHADOW E&M-EST. PATIENT-LVL IV: ICD-10-PCS | Mod: PBBFAC,,, | Performed by: FAMILY MEDICINE

## 2020-02-03 PROCEDURE — 99999 PR PBB SHADOW E&M-EST. PATIENT-LVL IV: CPT | Mod: PBBFAC,,, | Performed by: FAMILY MEDICINE

## 2020-02-03 PROCEDURE — 99395 PR PREVENTIVE VISIT,EST,18-39: ICD-10-PCS | Mod: S$GLB,,, | Performed by: FAMILY MEDICINE

## 2020-02-03 RX ORDER — HYDROCORTISONE VALERATE CREAM 2 MG/G
CREAM TOPICAL DAILY
Qty: 45 G | Refills: 0 | Status: SHIPPED | OUTPATIENT
Start: 2020-02-03 | End: 2021-02-04

## 2020-02-03 RX ORDER — METRONIDAZOLE 7.5 MG/G
CREAM TOPICAL 2 TIMES DAILY
Qty: 45 G | Refills: 0 | Status: SHIPPED | OUTPATIENT
Start: 2020-02-03 | End: 2020-06-08

## 2020-02-03 RX ORDER — DEXTROAMPHETAMINE SULFATE 5 MG/1
TABLET ORAL
COMMUNITY
Start: 2019-12-13 | End: 2024-02-20

## 2020-02-03 RX ORDER — CYCLOBENZAPRINE HCL 10 MG
10 TABLET ORAL NIGHTLY
Qty: 30 TABLET | Refills: 11 | Status: SHIPPED | OUTPATIENT
Start: 2020-02-03 | End: 2021-02-04

## 2020-02-03 RX ORDER — CYCLOBENZAPRINE HCL 5 MG
TABLET ORAL
COMMUNITY
Start: 2019-12-13 | End: 2020-02-03

## 2020-02-03 RX ORDER — PROPRANOLOL HYDROCHLORIDE 40 MG/1
TABLET ORAL
COMMUNITY
Start: 2019-12-13 | End: 2021-02-04

## 2020-02-03 NOTE — PROGRESS NOTES
Subjective:       Patient ID: Cynthia Dailey is a 39 y.o. female.    Chief Complaint: Establish Care; Annual Exam; and Headache (having mostly in the moring but lasted  about a month)    HPI 39-year-old white female former patient of Dr. Saldana presents to clinic today to establish care as she has recently moved from the [place].  She continues to be seen by Psychiatry for treatment of anxiety, depression, and ADHD.  She reports daily headaches that are bitemporal to occipital in nature.  She reports minimal relief with use of Fioricet.  She has suffered TMJ dislocation in the past and continues to report frequent tooth grinding which relates to her headaches.  She has also been treated for asthma which remains stable on QVAR.  She has a past surgical history of cholecystectomy, rectal prolapse repair, and mandible fracture repair.  She reports no significant family medical history.  Finally, she reports frequent foot pain that occurs with walking.  She states that this started after purchasing a new pair of shoes for jogging.  She jogs daily.  Review of Systems   Constitutional: Negative for appetite change, chills, fatigue and fever.   HENT: Negative for congestion, ear pain, hearing loss, postnasal drip, rhinorrhea, sinus pressure, sore throat and tinnitus.    Eyes: Positive for photophobia. Negative for redness, itching and visual disturbance.   Respiratory: Negative for cough, chest tightness and shortness of breath.    Cardiovascular: Negative for chest pain and palpitations.   Gastrointestinal: Positive for nausea. Negative for abdominal pain, constipation, diarrhea and vomiting.   Genitourinary: Negative for decreased urine volume, difficulty urinating, dysuria, frequency, hematuria and urgency.   Musculoskeletal: Positive for arthralgias (foot pain) and neck pain. Negative for back pain, myalgias and neck stiffness.   Skin: Positive for rash (Perioral dermatitis).   Neurological: Positive for headaches.  Negative for dizziness and light-headedness.   Psychiatric/Behavioral: Negative.        Objective:      Physical Exam   Constitutional: She is oriented to person, place, and time. She appears well-developed and well-nourished. No distress.   HENT:   Head: Normocephalic and atraumatic.       Right Ear: External ear normal.   Left Ear: External ear normal.   Nose: Nose normal.   Mouth/Throat: Oropharynx is clear and moist. No oropharyngeal exudate.   Tenderness to palpation to bilateral TMJ   Eyes: Pupils are equal, round, and reactive to light. Conjunctivae and EOM are normal. Right eye exhibits no discharge. Left eye exhibits no discharge. No scleral icterus.   Neck: Normal range of motion. Neck supple. No JVD present. No tracheal deviation present. No thyromegaly present.   Cardiovascular: Normal rate, regular rhythm, normal heart sounds and intact distal pulses. Exam reveals no gallop and no friction rub.   No murmur heard.  Pulmonary/Chest: Effort normal and breath sounds normal. No stridor. No respiratory distress. She has no wheezes. She has no rales.   Abdominal: Soft. Bowel sounds are normal. She exhibits no distension and no mass. There is no tenderness. There is no rebound and no guarding.   Musculoskeletal: Normal range of motion. She exhibits no edema or tenderness.   Lymphadenopathy:     She has no cervical adenopathy.   Neurological: She is alert and oriented to person, place, and time.   Skin: Skin is warm and dry. No rash noted. She is not diaphoretic. No erythema. No pallor.   Psychiatric: She has a normal mood and affect. Her behavior is normal. Judgment and thought content normal.   Nursing note and vitals reviewed.      Assessment:       1. Well adult exam    2. Perioral dermatitis    3. Mild intermittent chronic asthma without complication    4. Attention deficit disorder, unspecified hyperactivity presence    5. Anxiety    6. Gastroesophageal reflux disease, esophagitis presence not specified     7. Spasm of muscle    8. Dislocation of temporomandibular joint, sequela    9. Fatigue, unspecified type        Plan:       Well adult exam  -     CBC auto differential; Future; Expected date: 02/03/2020  -     Comprehensive metabolic panel; Future; Expected date: 02/03/2020  -     Lipid panel; Future; Expected date: 02/03/2020  -     T4, free; Future; Expected date: 02/03/2020  -     TSH; Future; Expected date: 02/03/2020  -     Urinalysis; Future; Expected date: 02/03/2020  -     Vitamin D; Future; Expected date: 02/03/2020  -     Hemoglobin A1c; Future; Expected date: 02/03/2020  -     Iron and TIBC; Future; Expected date: 02/03/2020  -     Vitamin B12; Future; Expected date: 02/03/2020  -     Folate; Future; Expected date: 02/03/2020  -     VITAMIN B1; Future; Expected date: 02/03/2020    Perioral dermatitis  -     metronidazole 0.75% (METROCREAM) 0.75 % Crea; Apply topically 2 (two) times daily.  Dispense: 45 g; Refill: 0  -     hydrocortisone (WESTCORT) 0.2 % cream; Apply topically once daily.  Dispense: 45 g; Refill: 0    Mild intermittent chronic asthma without complication  -     Ambulatory Referral to Pulmonology    Attention deficit disorder, unspecified hyperactivity presence   Stable and followed by Psychiatry.    Anxiety   Stable and followed by Psychiatry.    Gastroesophageal reflux disease, esophagitis presence not specified   Continue pantoprazole 40 mg daily.    Spasm of muscle  -     cyclobenzaprine (FLEXERIL) 10 MG tablet; Take 1 tablet (10 mg total) by mouth nightly.  Dispense: 30 tablet; Refill: 11  -     Ambulatory Referral to Physical/Occupational Therapy    Dislocation of temporomandibular joint, sequela  -     cyclobenzaprine (FLEXERIL) 10 MG tablet; Take 1 tablet (10 mg total) by mouth nightly.  Dispense: 30 tablet; Refill: 11  -     Ambulatory Referral to Physical/Occupational Therapy    Fatigue, unspecified type  -     Iron and TIBC; Future; Expected date: 02/03/2020  -     Vitamin  B12; Future; Expected date: 02/03/2020  -     Folate; Future; Expected date: 02/03/2020  -     VITAMIN B1; Future; Expected date: 02/03/2020      Return to clinic as needed if symptoms persist or worsen or in 1 year for annual exam.

## 2020-02-04 ENCOUNTER — TELEPHONE (OUTPATIENT)
Dept: FAMILY MEDICINE | Facility: CLINIC | Age: 40
End: 2020-02-04

## 2020-02-04 ENCOUNTER — LAB VISIT (OUTPATIENT)
Dept: LAB | Facility: HOSPITAL | Age: 40
End: 2020-02-04
Attending: FAMILY MEDICINE
Payer: COMMERCIAL

## 2020-02-04 ENCOUNTER — TELEPHONE (OUTPATIENT)
Dept: PULMONOLOGY | Facility: CLINIC | Age: 40
End: 2020-02-04

## 2020-02-04 ENCOUNTER — PATIENT OUTREACH (OUTPATIENT)
Dept: ADMINISTRATIVE | Facility: OTHER | Age: 40
End: 2020-02-04

## 2020-02-04 DIAGNOSIS — R53.83 FATIGUE, UNSPECIFIED TYPE: ICD-10-CM

## 2020-02-04 DIAGNOSIS — Z00.00 WELL ADULT EXAM: ICD-10-CM

## 2020-02-04 DIAGNOSIS — J45.909 ASTHMA, UNSPECIFIED ASTHMA SEVERITY, UNSPECIFIED WHETHER COMPLICATED, UNSPECIFIED WHETHER PERSISTENT: Primary | ICD-10-CM

## 2020-02-04 LAB
25(OH)D3+25(OH)D2 SERPL-MCNC: 48 NG/ML (ref 30–96)
ALBUMIN SERPL BCP-MCNC: 4.3 G/DL (ref 3.5–5.2)
ALP SERPL-CCNC: 77 U/L (ref 55–135)
ALT SERPL W/O P-5'-P-CCNC: 36 U/L (ref 10–44)
ANION GAP SERPL CALC-SCNC: 12 MMOL/L (ref 8–16)
AST SERPL-CCNC: 23 U/L (ref 10–40)
BASOPHILS # BLD AUTO: 0.04 K/UL (ref 0–0.2)
BASOPHILS NFR BLD: 0.9 % (ref 0–1.9)
BILIRUB SERPL-MCNC: 0.8 MG/DL (ref 0.1–1)
BUN SERPL-MCNC: 21 MG/DL (ref 6–20)
CALCIUM SERPL-MCNC: 9.6 MG/DL (ref 8.7–10.5)
CHLORIDE SERPL-SCNC: 108 MMOL/L (ref 95–110)
CHOLEST SERPL-MCNC: 151 MG/DL (ref 120–199)
CHOLEST/HDLC SERPL: 2.2 {RATIO} (ref 2–5)
CO2 SERPL-SCNC: 21 MMOL/L (ref 23–29)
CREAT SERPL-MCNC: 0.8 MG/DL (ref 0.5–1.4)
DIFFERENTIAL METHOD: ABNORMAL
EOSINOPHIL # BLD AUTO: 0 K/UL (ref 0–0.5)
EOSINOPHIL NFR BLD: 0.9 % (ref 0–8)
ERYTHROCYTE [DISTWIDTH] IN BLOOD BY AUTOMATED COUNT: 14.3 % (ref 11.5–14.5)
EST. GFR  (AFRICAN AMERICAN): >60 ML/MIN/1.73 M^2
EST. GFR  (NON AFRICAN AMERICAN): >60 ML/MIN/1.73 M^2
ESTIMATED AVG GLUCOSE: 97 MG/DL (ref 68–131)
FOLATE SERPL-MCNC: 15.6 NG/ML (ref 4–24)
GLUCOSE SERPL-MCNC: 87 MG/DL (ref 70–110)
HBA1C MFR BLD HPLC: 5 % (ref 4–5.6)
HCT VFR BLD AUTO: 42.3 % (ref 37–48.5)
HDLC SERPL-MCNC: 70 MG/DL (ref 40–75)
HDLC SERPL: 46.4 % (ref 20–50)
HGB BLD-MCNC: 12.8 G/DL (ref 12–16)
IMM GRANULOCYTES # BLD AUTO: 0 K/UL (ref 0–0.04)
IMM GRANULOCYTES NFR BLD AUTO: 0 % (ref 0–0.5)
IRON SERPL-MCNC: 106 UG/DL (ref 30–160)
LDLC SERPL CALC-MCNC: 72.2 MG/DL (ref 63–159)
LYMPHOCYTES # BLD AUTO: 2 K/UL (ref 1–4.8)
LYMPHOCYTES NFR BLD: 44.3 % (ref 18–48)
MCH RBC QN AUTO: 31.1 PG (ref 27–31)
MCHC RBC AUTO-ENTMCNC: 30.3 G/DL (ref 32–36)
MCV RBC AUTO: 103 FL (ref 82–98)
MONOCYTES # BLD AUTO: 0.3 K/UL (ref 0.3–1)
MONOCYTES NFR BLD: 5.9 % (ref 4–15)
NEUTROPHILS # BLD AUTO: 2.2 K/UL (ref 1.8–7.7)
NEUTROPHILS NFR BLD: 48 % (ref 38–73)
NONHDLC SERPL-MCNC: 81 MG/DL
NRBC BLD-RTO: 0 /100 WBC
PLATELET # BLD AUTO: 249 K/UL (ref 150–350)
PMV BLD AUTO: 11.4 FL (ref 9.2–12.9)
POTASSIUM SERPL-SCNC: 4.1 MMOL/L (ref 3.5–5.1)
PROT SERPL-MCNC: 7 G/DL (ref 6–8.4)
RBC # BLD AUTO: 4.12 M/UL (ref 4–5.4)
SATURATED IRON: 29 % (ref 20–50)
SODIUM SERPL-SCNC: 141 MMOL/L (ref 136–145)
T4 FREE SERPL-MCNC: 0.89 NG/DL (ref 0.71–1.51)
TOTAL IRON BINDING CAPACITY: 369 UG/DL (ref 250–450)
TRANSFERRIN SERPL-MCNC: 249 MG/DL (ref 200–375)
TRIGL SERPL-MCNC: 44 MG/DL (ref 30–150)
TSH SERPL DL<=0.005 MIU/L-ACNC: 1.88 UIU/ML (ref 0.4–4)
VIT B12 SERPL-MCNC: 779 PG/ML (ref 210–950)
WBC # BLD AUTO: 4.54 K/UL (ref 3.9–12.7)

## 2020-02-04 PROCEDURE — 84443 ASSAY THYROID STIM HORMONE: CPT

## 2020-02-04 PROCEDURE — 82607 VITAMIN B-12: CPT

## 2020-02-04 PROCEDURE — 83540 ASSAY OF IRON: CPT

## 2020-02-04 PROCEDURE — 36415 COLL VENOUS BLD VENIPUNCTURE: CPT | Mod: PO

## 2020-02-04 PROCEDURE — 82306 VITAMIN D 25 HYDROXY: CPT

## 2020-02-04 PROCEDURE — 84425 ASSAY OF VITAMIN B-1: CPT

## 2020-02-04 PROCEDURE — 83036 HEMOGLOBIN GLYCOSYLATED A1C: CPT

## 2020-02-04 PROCEDURE — 80053 COMPREHEN METABOLIC PANEL: CPT

## 2020-02-04 PROCEDURE — 82746 ASSAY OF FOLIC ACID SERUM: CPT

## 2020-02-04 PROCEDURE — 84439 ASSAY OF FREE THYROXINE: CPT

## 2020-02-04 PROCEDURE — 80061 LIPID PANEL: CPT

## 2020-02-04 PROCEDURE — 85025 COMPLETE CBC W/AUTO DIFF WBC: CPT

## 2020-02-04 NOTE — TELEPHONE ENCOUNTER
Spoke w/ pt. Pt has Pulm appt tomorrow. 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 Pt agreed.

## 2020-02-04 NOTE — TELEPHONE ENCOUNTER
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  She just established care with a new PCP closer to home. When she saw that physician they recommended she see a pulmonologist in order to sign off on the form.  She is not having any complications or issues.  She needs advice or to schedule with someone at Dr. Saldana clinic to speak about this and see if the provider will sign the form.  She does not see any reason to see a pulmonologist just to get cleared for this.

## 2020-02-05 ENCOUNTER — HOSPITAL ENCOUNTER (OUTPATIENT)
Dept: PULMONOLOGY | Facility: CLINIC | Age: 40
Discharge: HOME | End: 2020-02-05
Payer: COMMERCIAL

## 2020-02-05 ENCOUNTER — OFFICE VISIT (OUTPATIENT)
Dept: PULMONOLOGY | Facility: CLINIC | Age: 40
End: 2020-02-05
Payer: COMMERCIAL

## 2020-02-05 VITALS
OXYGEN SATURATION: 100 % | DIASTOLIC BLOOD PRESSURE: 80 MMHG | BODY MASS INDEX: 23.22 KG/M2 | HEIGHT: 64 IN | HEART RATE: 57 BPM | SYSTOLIC BLOOD PRESSURE: 102 MMHG | WEIGHT: 136 LBS

## 2020-02-05 DIAGNOSIS — J45.909 ASTHMA, UNSPECIFIED ASTHMA SEVERITY, UNSPECIFIED WHETHER COMPLICATED, UNSPECIFIED WHETHER PERSISTENT: ICD-10-CM

## 2020-02-05 DIAGNOSIS — J45.20 MILD INTERMITTENT ASTHMA WITHOUT COMPLICATION: ICD-10-CM

## 2020-02-05 LAB
DLCO ADJ PRE: 22.21 ML/(MIN*MMHG) (ref 20.34–31.81)
DLCO SINGLE BREATH LLN: 20.34
DLCO SINGLE BREATH PRE REF: 83.6 %
DLCO SINGLE BREATH REF: 26.07
DLCOC SBVA LLN: 3.69
DLCOC SBVA PRE REF: 83.3 %
DLCOC SBVA REF: 5.25
DLCOC SINGLE BREATH LLN: 20.34
DLCOC SINGLE BREATH PRE REF: 85.2 %
DLCOC SINGLE BREATH REF: 26.07
DLCOCSBVAULN: 6.81
DLCOCSINGLEBREATHULN: 31.81
DLCOSINGLEBREATHULN: 31.81
DLCOVA LLN: 3.69
DLCOVA PRE REF: 81.7 %
DLCOVA PRE: 4.29 ML/(MIN*MMHG*L) (ref 3.69–6.81)
DLCOVA REF: 5.25
DLCOVAULN: 6.81
DLVAADJ PRE: 4.37 ML/(MIN*MMHG*L) (ref 3.69–6.81)
ERVN2 LLN: 1.13
ERVN2 PRE REF: 39.8 %
ERVN2 PRE: 0.45 L (ref 1.13–1.13)
ERVN2 REF: 1.13
ERVN2ULN: 1.13
FEF 25 75 LLN: 2
FEF 25 75 PRE REF: 65.8 %
FEF 25 75 REF: 3.25
FET100 CHG: 4.3 %
FEV05 LLN: 1.3
FEV05 REF: 2.16
FEV1 CHG: 6.2 %
FEV1 FVC LLN: 72
FEV1 FVC PRE REF: 92 %
FEV1 FVC REF: 83
FEV1 LLN: 2.45
FEV1 PRE REF: 86.7 %
FEV1 REF: 3.07
FEV1 VOL CHG: 0.16
FRCN2 LLN: 1.87
FRCN2 PRE REF: 65.8 %
FRCN2 REF: 2.69
FRCN2ULN: 3.51
FVC CHG: 3.1 %
FVC LLN: 2.98
FVC PRE REF: 93.7 %
FVC REF: 3.73
FVC VOL CHG: 0.11
IVC PRE: 3.81 L (ref 2.98–4.48)
IVC SINGLE BREATH LLN: 2.98
IVC SINGLE BREATH PRE REF: 102.3 %
IVC SINGLE BREATH REF: 3.73
IVCSINGLEBREATHULN: 4.48
PEF LLN: 5.3
PEF PRE REF: 113.4 %
PEF REF: 7.02
PHYSICIAN COMMENT: ABNORMAL
POST FEF 25 75: 2.55 L/S (ref 2–4.51)
POST FET 100: 6.55 SEC
POST FEV1 FVC: 78.37 % (ref 71.59–93.78)
POST FEV1: 2.82 L (ref 2.45–3.69)
POST FEV5: 2.23 L (ref 1.3–3.01)
POST FVC: 3.6 L (ref 2.98–4.48)
POST PEF: 7.35 L/S (ref 5.3–8.74)
PRE DLCO: 21.79 ML/(MIN*MMHG) (ref 20.34–31.81)
PRE FEF 25 75: 2.14 L/S (ref 2–4.51)
PRE FET 100: 6.28 SEC
PRE FEV05 REF: 97 %
PRE FEV1 FVC: 76.1 % (ref 71.59–93.78)
PRE FEV1: 2.66 L (ref 2.45–3.69)
PRE FEV5: 2.09 L (ref 1.3–3.01)
PRE FRC N2: 1.77 L
PRE FVC: 3.49 L (ref 2.98–4.48)
PRE PEF: 7.96 L/S (ref 5.3–8.74)
RVN2 LLN: 0.98
RVN2 PRE REF: 84.7 %
RVN2 PRE: 1.32 L (ref 0.98–2.13)
RVN2 REF: 1.56
RVN2TLCN2 LLN: 22.29
RVN2TLCN2 PRE REF: 81 %
RVN2TLCN2 PRE: 25.83 % (ref 22.29–41.47)
RVN2TLCN2 REF: 31.88
RVN2TLCN2ULN: 41.47
RVN2ULN: 2.13
TLCN2 LLN: 3.98
TLCN2 PRE REF: 102.9 %
TLCN2 PRE: 5.11 L (ref 3.98–5.96)
TLCN2 REF: 4.97
TLCN2ULN: 5.96
VA PRE: 5.08 L (ref 4.82–4.82)
VA SINGLE BREATH LLN: 4.82
VA SINGLE BREATH PRE REF: 105.5 %
VA SINGLE BREATH REF: 4.82
VASINGLEBREATHULN: 4.82
VCMAXN2 LLN: 2.98
VCMAXN2 PRE REF: 101.6 %
VCMAXN2 PRE: 3.79 L (ref 2.98–4.48)
VCMAXN2 REF: 3.73
VCMAXN2ULN: 4.48

## 2020-02-05 PROCEDURE — 94060 EVALUATION OF WHEEZING: CPT | Mod: S$GLB,,, | Performed by: INTERNAL MEDICINE

## 2020-02-05 PROCEDURE — 94727 GAS DIL/WSHOT DETER LNG VOL: CPT | Mod: S$GLB,,, | Performed by: INTERNAL MEDICINE

## 2020-02-05 PROCEDURE — 94727 PR PULM FUNCTION TEST BY GAS: ICD-10-PCS | Mod: S$GLB,,, | Performed by: INTERNAL MEDICINE

## 2020-02-05 PROCEDURE — 99999 PR PBB SHADOW E&M-EST. PATIENT-LVL III: ICD-10-PCS | Mod: PBBFAC,,, | Performed by: NURSE PRACTITIONER

## 2020-02-05 PROCEDURE — 94060 PR EVAL OF BRONCHOSPASM: ICD-10-PCS | Mod: S$GLB,,, | Performed by: INTERNAL MEDICINE

## 2020-02-05 PROCEDURE — 99999 PR PBB SHADOW E&M-EST. PATIENT-LVL III: CPT | Mod: PBBFAC,,, | Performed by: NURSE PRACTITIONER

## 2020-02-05 PROCEDURE — 94729 DIFFUSING CAPACITY: CPT | Mod: S$GLB,,, | Performed by: INTERNAL MEDICINE

## 2020-02-05 PROCEDURE — 99204 OFFICE O/P NEW MOD 45 MIN: CPT | Mod: 25,S$GLB,, | Performed by: NURSE PRACTITIONER

## 2020-02-05 PROCEDURE — 99204 PR OFFICE/OUTPT VISIT, NEW, LEVL IV, 45-59 MIN: ICD-10-PCS | Mod: 25,S$GLB,, | Performed by: NURSE PRACTITIONER

## 2020-02-05 PROCEDURE — 94729 PR C02/MEMBANE DIFFUSE CAPACITY: ICD-10-PCS | Mod: S$GLB,,, | Performed by: INTERNAL MEDICINE

## 2020-02-05 NOTE — LETTER
February 5, 2020      Rito Alvarado MD  [address] [facility] [place] [address]           [facility]  [address]  [place] [address]  Phone: [phone]          Patient: Cynthia Dailey   MR Number: [id number]   YOB: 1980   Date of Visit: 2/5/2020       Dear Dr. Rito Alvarado:    Thank you for referring Cynthia Dailey to me for evaluation. Attached you will find relevant portions of my assessment and plan of care.    If you have questions, please do not hesitate to call me. I look forward to following Cynthia Dailey along with you.    Sincerely,    Yanet Alexis, NP    Enclosure  CC:  No Recipients    If you would like to receive this communication electronically, please contact [email or web address] or [phone] to request more information on [employer] Link access.    For providers and/or their staff who would like to refer a patient to [facility], please contact us through our one-stop-shop provider referral line, Angie Kaminski, at [phone].    If you feel you have received this communication in error or would no longer like to receive these types of communications, please e-mail [email or web address]

## 2020-02-06 NOTE — ASSESSMENT & PLAN NOTE
Spirometry is normal. Spirometry remains unimproved following bronchodilator. Lung volume determination is likely  normal despite the reduced FRC of unknown significance. DLCO is normal.    Pt asthma is currently stable with no increases in SOB, JOE or wheezing.  There has been no increase in use of  rescue medications.  Have reviewed medications with pt including the roles of rescue and controlling medications.  All questions answered.  Pt reports no problems with technique with inhalers.  We discussed the possibility of de-escalation of therapy if asthma control remains stable.    ACT - 25     -patient is not reporting any absolute respiratory contraindications.  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  The patient understands the risks involved and scuba diving and accepts them. Provided letter to continue DAN program- letter scanned into chart.

## 2020-02-06 NOTE — PROGRESS NOTES
Subjective:       Patient ID: Cynthia Dailey is a 39 y.o. female.    Chief Complaint: Asthma    HPI:   Cynthia Dailey is a 39 y.o. female who presents with asthma evaluation. Ms. 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  She reports a history of asthma -since teenager.  States she is very well controlled with her current regimen of Flovent 44mcg daily inhaler.  Explains has not needed albuterol.  She informed me that her asthma has been controlled for years.  She denies cough, wheezing, shortness of breath or chest tightness.  She states she is very active and runs 5-7 miles a day.  Explains she does not experience dyspnea on exertion.  She reports no history of hospitalizations, ER visitation or urgent care visitation is in relation to her asthma.  She denies ever being intubated for asthma in the past.  She denies any absolute contraindications of history of spontaneous pneumothorax, exercise-induced bronchospasms or any restrictive disease with exercise impairment.  She denies history of seizures. She reports a brief smoking hx < 6 pack years and quit in 2006.   ACT- 25.     Review of Systems   Constitutional: Negative for fever, chills, weight loss and night sweats.   HENT: Negative for postnasal drip, rhinorrhea, trouble swallowing and congestion.    Respiratory: Negative for apnea, cough, sputum production, choking, chest tightness, wheezing, dyspnea on extertion and use of rescue inhaler.    Cardiovascular: Negative for chest pain and leg swelling.   Musculoskeletal: Negative for joint swelling.   Skin: Negative for rash.   Gastrointestinal: Negative for acid reflux.   Neurological: Negative for dizziness and light-headedness.   Hematological: Negative for adenopathy.   Psychiatric/Behavioral: Negative for sleep disturbance.         Social History     Tobacco Use    Smoking status: Former Smoker     Packs/day: 0.75     Years: 8.00     Pack years: 6.00     Types:  Cigarettes     Last attempt to quit: 2006     Years since quittin.2    Smokeless tobacco: Never Used   Substance Use Topics    Alcohol use: Yes     Alcohol/week: 7.0 standard drinks     Types: 7 Glasses of wine per week       Review of patient's allergies indicates:   Allergen Reactions    Iodinated contrast media Anaphylaxis    Ciprofloxacin Rash     Past Medical History:   Diagnosis Date    ADD (attention deficit disorder)     Anxiety     Asthma     Corns and callosities     Depression     Dislocated jaw     GERD (gastroesophageal reflux disease)     Rectal prolapse     Wears glasses      Past Surgical History:   Procedure Laterality Date    CHOLECYSTECTOMY      COLON SURGERY  2015    Rectal Prolaps    COLONOSCOPY N/A 10/12/2017    Procedure: Colonoscopy;  Surgeon: Lucy Cortes MD;  Location: [address]);  Service: Endoscopy;  Laterality: N/A;    MANDIBLE FRACTURE SURGERY      RECTAL PROLAPSE REPAIR, RECTOPEXY       Current Outpatient Medications on File Prior to Visit   Medication Sig    albuterol (PROVENTIL) 2.5 mg /3 mL (0.083 %) nebulizer solution Take 3 mLs (2.5 mg total) by nebulization every 4 (four) hours as needed for Wheezing.    cyanocobalamin, vitamin B-12, (VITAMIN B-12) 1,000 mcg/mL Drop Take by mouth.    cyclobenzaprine (FLEXERIL) 10 MG tablet Take 1 tablet (10 mg total) by mouth nightly.    GAVILAX 17 gram/dose powder MIX AND DRINK 25 GRAMS ONCE DAILY    hydrocortisone (WESTCORT) 0.2 % cream Apply topically once daily.    lorazepam (ATIVAN) 1 MG tablet Take by mouth every 6 (six) hours as needed for Anxiety.     metronidazole 0.75% (METROCREAM) 0.75 % Crea Apply topically 2 (two) times daily.    mometasone (NASONEX) 50 mcg/actuation nasal spray 2 sprays by Nasal route once daily.    ondansetron (ZOFRAN) 4 MG tablet Take 1 tablet (4 mg total) by mouth 2 (two) times daily.    ondansetron (ZOFRAN-ODT) 4 MG TbDL Take 1 tablet (4 mg total) by mouth  "every 8 (eight) hours as needed.    pantoprazole (PROTONIX) 40 MG tablet TAKE 1 TABLET DAILY    propranolol (INDERAL) 40 MG tablet     sodium chloride 0.9% 0.9 % SolP 50 mL with ketamine 100 mg/mL Soln 10 mg/mL Inject 2,267 mcg/kg/min into the vein every 30 days.    vitamin a palmitate-B-carotene 25,000 unit (15K-10K unit) Tab Take 1 tablet by mouth once daily.    vitamin D 1000 units Tab Take 1,000 Units by mouth once daily.     ZENZEDI 5 mg tablet     fluticasone (FLOVENT HFA) 44 mcg/actuation inhaler Inhale 2 puffs into the lungs 2 (two) times daily.     No current facility-administered medications on file prior to visit.      Objective:       Vitals:    20 1505   BP: 102/80   BP Location: Left arm   Patient Position: Sitting   Pulse: (!) 57   SpO2: 100%   Weight: 61.7 kg (136 lb)   Height: 5' 4" (1.626 m)     Physical Exam   Constitutional: She is oriented to person, place, and time. She appears well-developed and well-nourished. No distress.   HENT:   Head: Normocephalic.   Neck: Normal range of motion. Neck supple.   Cardiovascular: Normal rate, regular rhythm and normal heart sounds.   Pulmonary/Chest: Normal expansion, effort normal and breath sounds normal. No respiratory distress. She has no wheezes. She has no rhonchi.   Abdominal: Soft. Bowel sounds are normal. There is no tenderness.   Musculoskeletal: Normal range of motion. She exhibits no edema.   Lymphadenopathy: No supraclavicular adenopathy is present.     She has no cervical adenopathy.   Neurological: She is alert and oriented to person, place, and time. Gait normal.   Skin: Skin is warm and dry. Nails show no clubbing.   Psychiatric: She has a normal mood and affect. Her behavior is normal.   Vitals reviewed.    Personal Diagnostic Review  Pulmonary function tests: FEV1: 2.66  (87% predicted), FVC:  3.49(94 % predicted), FEV1/FVC:  76, T.11 (103 % predicted), DLCO: 21.8 (84 % predicted)  Post BD  FEV1: 2.82  (92% predicted), " FVC:  3.60(97 % predicted), FEV1/FVC:  78      Assessment:     Problem List Items Addressed This Visit        Pulmonary    Mild intermittent asthma without complication    Current Assessment & Plan     Spirometry is normal. Spirometry remains unimproved following bronchodilator. Lung volume determination is likely  normal despite the reduced FRC of unknown significance. DLCO is normal.    Pt asthma is currently stable with no increases in SOB, JOE or wheezing.  There has been no increase in use of  rescue medications.  Have reviewed medications with pt including the roles of rescue and controlling medications.  All questions answered.  Pt reports no problems with technique with inhalers.  We discussed the possibility of de-escalation of therapy if asthma control remains stable.    ACT - 25     -patient is not reporting any absolute respiratory contraindications.  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  The patient understands the risks involved and scuba diving and accepts them. Provided letter to continue DAN program- letter scanned into chart.              Follow up as needed

## 2020-02-07 LAB — VIT B1 BLD-MCNC: 66 UG/L (ref 38–122)

## 2020-02-20 ENCOUNTER — CLINICAL SUPPORT (OUTPATIENT)
Dept: REHABILITATION | Facility: OTHER | Age: 40
End: 2020-02-20
Attending: OBSTETRICS & GYNECOLOGY
Payer: COMMERCIAL

## 2020-02-20 DIAGNOSIS — G44.229 CHRONIC TENSION-TYPE HEADACHE, NOT INTRACTABLE: ICD-10-CM

## 2020-02-20 DIAGNOSIS — M26.629 CHRONIC TMJ PAIN: ICD-10-CM

## 2020-02-20 DIAGNOSIS — R29.3 POSTURE IMBALANCE: ICD-10-CM

## 2020-02-20 DIAGNOSIS — G89.29 CHRONIC TMJ PAIN: ICD-10-CM

## 2020-02-20 PROCEDURE — 97162 PT EVAL MOD COMPLEX 30 MIN: CPT | Mod: PN

## 2020-02-20 PROCEDURE — 97110 THERAPEUTIC EXERCISES: CPT | Mod: PN

## 2020-02-26 NOTE — PROGRESS NOTES
Dry Needling Daily Note     Patient ID: Cynthia Dailey is a 39 y.o. female.  Diagnosis:   1. Chronic TMJ pain     2. Posture imbalance     3. Chronic tension-type headache, not intractable       Date:  02/26/2020    Start Time:  10:30  Stop Time:  10:55    Subjective:     Pt reports: bilateral TMJ pain, neck pain  Pain Scale: Cynthia rates pain on a scale of 0-10 to be 3 currently.    Objective:     Pt signed written consent to dry needling Rx.  Pt gave verbal consent for DN.   Pt rec'd dry needling to bilateral face/TMJ with 0.5-1 in needles with no adverse effects.    Homeostatic points:  1. Deep Radial  2. Greater Auricular  3. Spinal Accessory  4. Saphenous  5. Deep Fibular  6. Tibial  7. Greater Occipital -- add next visit  8. Suprascapular ( infraspinatus)  9. Lateral Antebrachial Cutaneous  10. Sural  11. Lateral Popliteal  12. Superficial Radial  13. Dorsal Scapular -- add next visit  14. Superior Cluneal  15. Posterior Cutaneous L 2  16. Inferior Gluteal  17. Lateral Pectoral  18. Ilitotibal  19. Infraorbital -- add next visit?  20. Spinous process T7  21. Posterior cutaneous  T6  22. Posterior cutaneous L 5  23. Supraorbital  24. Common fibular    Paravertebral Points:  None today. Consider adding CSP/TSP    Symptomatic Points:   Masseter (proximal, distal at mandibular angle), TMJ, temporalis    Assessment:     Patient demonstrated appropriate response to FDN. Noted improvement in ROM with reduced deviations. Winding technique used every 5 minutes for pain reduction and improved overall mobility.    Patient Education/Response:     Education provided re:   Purpose, benefits, and potential side effects of dry needling.   Educated pt to use heat following treatment sessions to reduce c/o pain or soreness and to improve circulation to needled sites.   Encouraged pt to continue with HEP to maintain flexibility, ROM, and functional mobility.  Cynthia verbalized good understanding of education     Plans and Goals:      Monitor response to FDN. Continue with FDN in POC as tolerated.     Yolanda Bowser, PT  2/26/2020

## 2020-02-26 NOTE — PLAN OF CARE
"[facility]  Physical Therapy Initial Evaluation    Name: Cynthia Dailey  Clinic Number: [id number]    Therapy Diagnosis:   Encounter Diagnoses   Name Primary?    Chronic TMJ pain     Posture imbalance     Chronic tension-type headache, not intractable      Physician: Rito Alvarado MD    Physician Orders: PT Eval and Treat   Medical Diagnosis from Referral:   M62.838 (ICD-10-CM) - Spasm of muscle   S03.00XS (ICD-10-CM) - Dislocation of temporomandibular joint, sequela       Evaluation Date: 2/20/2020  Authorization Period Expiration: 12/31/2020  Plan of Care Expiration: 05/20/2020  Visit # / Visits authorized: 1/ 10    Time In: 10:00 am  Time Out: 11:00 am  Total Appointment Time (timed & untimed codes): 60 minutes    Precautions: Standard, anxiety, depression, asthma    Subjective   Date of onset: 2001  History of current condition - Cynthia reports: chronic bilateral TMJ pain with daily headaches (temporal and occipital regions) that have been intermittent since 2001 following a TMJ dislocation and bilateral mandibular fracture after a fall - says that her jaw was wired shut x 6 weeks to improve healing.     Jaw pain predominantly in the temples and behind the ears (R>L), with occasional pain down the neck. She has a Hx of teeth grinding and uses a bite guard. Notes stiffness in the AM, but pain worsens with repetitive opening/closing, ie - difficulty with chewing and more pain with opening (due to limited ROM), not necessarily biting down on hard foods. Denies pain/difficulty with swallowing, but has pain with sucking through a straw. Also feels clicking and popping but occasioally feels stuck followed by difficulty with closing.    Headaches are daily with minimal relief using medication. Headaches occur in bilateral temoralis and occipital areas. She says that her tooth grinding at night and repetitive chewing also "relate to my headaches."     Medical History:   Past Medical " History:   Diagnosis Date    ADD (attention deficit disorder)     Anxiety     Asthma     Corns and callosities     Depression     Dislocated jaw     GERD (gastroesophageal reflux disease)     Rectal prolapse     Wears glasses        Surgical History:   Cynthia Dailey  has a past surgical history that includes Cholecystectomy; Mandible fracture surgery; Colon surgery (Jan 2015); Rectal prolapse repair, rectopexy; and Colonoscopy (N/A, 10/12/2017).    Medications:   Cynthia has a current medication list which includes the following prescription(s): albuterol, cyanocobalamin (vitamin b-12), cyclobenzaprine, fluticasone propionate, gavilax, hydrocortisone, lorazepam, metronidazole 0.75%, mometasone, ondansetron, ondansetron, pantoprazole, propranolol, sodium chloride 0.9% 0.9 % SolP 50 mL with ketamine 100 mg/mL Soln 10 mg/mL, vitamin a palmitate-b-carotene, vitamin d, and zenzedi.    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 Recreational - exercise when feeling good  Prior Level of Function: independent  Current Level of Function: pain and difficulty with functional use of jaw and head/neck    Pain:  Current 3/10, worst 6/10, best 3/10   Location: bilateral (R>L) head, face (lateral, temporal, mandibular), neck, TMJ (behind the ear)  Description: Aching and Dull, crepitus - daily  Aggravating Factors: Sitting and looking down and turning to look over shoulder - increases tightness in neck/head, stress with work and school (clenchign, headaches)   Associated with jaw function - opening closing, protrusion, lateral excursion   Functional activities: chewing (hard foods), sucking through straw,  "yawning  Easing Factors: rest and medication prn    Red flags:   Sudden onset of "alarm clock" pain - none   CNS signs - gait / balance disturbance, tremors, ataxia, slurred speech - none   Angina with/without Hx of cardiac dysfunction - none   Visual changes - none   Dizziness - none   Headaches - yes   Neck pain - yes   Dizziness - none    Pts goals: reduce pain    Objective     Posture Alignment: slouched sitting, FHP with rounded shoulders, mild protrusion of mandible, winging of bilateral medial scapular borders   Face symmetry/structure: WFL  Teeth / Jaw Alignment: mild overbite, cross bite towards R  Lip closure: WNL  Tongue position: WNL  Occlusion: mild cross bite  Breathing pattern: WNL    DTR's: intact  Dermatomes: Sensation: Light Touch: Intact    Palpation:   Temporalis: +   Masseter: +    Medial pterygoid: +   Lateral pterygoid: +   SCM: + Prox/distal     Jaw AROM     Mandibular Opening Mandibular Closing LEFT Lateral Excursion RIGHT Lateral Excursion Protrusion   ROM 30 mm 0 mm <5 mm 5 mm 5 mm   Deviations + + - - -   Deflexions + + - - -   Pain + + + + +   Crepitus (crunching) + + - - -   Clicking / Popping (disc displacement) - - - - -   Tooth alignment --- abnormal --- --- ---             Special tests  Bite on seperators (unilateral) Pain on opposite side    Bite on seperators (bilateral) No change, pain bilaterally   Manual mandibular compression Pain, no change from baseline   Mandibular Joint distraction Mild improvement     Joint Mobility:   Long axis distraction: mild improvement  Medial glide: increased pain  Lateral glide: increased pain    Strength:   Resisted opening: fair, painful  Resisted closing: fair, painful  Resisted lateral excursion (masseter): fair, painful - selena  Resisted protrusion (lateral pterygoid): NT  Deep neck flexors: poor    Cranial nerve exam  CN 5 WNL   CN 7 WNL   CN 11 MMT Upper traps: R = 5L = 5  MMT Head turn (SCM): R = 5, L = 5 - but painful   CN 12 WNL " "      CERVICAL SPINE AROM:   Flexion: Mod-max becker, chin 1.5" to chest, pain in posterior neck/head   Extension: WNL, no pain   Left Sidebend: Mod becker, pain in L neck   Right Sidebend: Mod-min becker, stiffness in neck   Left Rotation: 50 deg, stiffness posteriorly   Right Rotation: 50 deg, stiffness posteriorly     Joint mobility: C2-7 DS/US = WNL, SC FB/SB = mod hypo, T1-5 PAs = mod hypo  Special tests: vertebral artery = (-), alar ligament integrity test = (-)    Shoulder Screen: ROM WNL, strength = 5/5  Scapular strength: poor       Limitation/Restriction for FOTO craniofascial Survey    Therapist reviewed FOTO scores for Cynthia Dailey on 2/20/2020.   FOTO documents entered into [employer] - see Media section.    Limitation Score: 38%  Goal <=28%       TREATMENT   Treatment Time In: 10:30  Treatment Time Out: 11:00  Total Treatment time (time-based codes) separate from Evaluation: 30 minutes    No therex performed today 2* to time constraints    Cynthia received the following manual therapy techniques: 25 min x dry needling - see note by Yolanda Self, PT    Home Exercises and Patient Education Provided    Education provided:   - Patient educated regarding surgical procedure, pathogenesis, diagnosis, protocol, prognosis, POC, and HEP. Written Home Exercises not Provided today due to time constraints. Pt educated on activity modifications to reduce c/o pain and improve overall function.   - Pt was educated in posture and body mechanics.  Use of a lumbar roll was recommended and demonstrated here today.  Purchase information provided. Also educated on use of still point inducer to reduce headaches and improve mobility.  - Pt also educated on use of modalities prn to reduce c/o pain and dysfunction.        Written Home Exercises Provided: none today due to time constraints.    Assessment   Cynthia is a 39 y.o. female referred to outpatient Physical Therapy with a medical diagnosis of muscle spasm and dislocation of TMJ. Pt " presents with marked limitations in craniocervical and cervicothoracic ROM, flexibility, and global craniocervicothoracic strength necessary for dynamic stability with ADLs, postural alignment, appropriate TMJ mechanics, and     Pt prognosis is Good.   Pt will benefit from skilled outpatient Physical Therapy to address the deficits stated above and in the chart below, provide pt/family education, and to maximize pt's level of independence.     Plan of care discussed with patient: Yes  Pt's spiritual, cultural and educational needs considered and patient is agreeable to the plan of care and goals as stated below:     Anticipated Barriers for therapy: Traumatic Hx of mandibular Fx, chronicity of symptoms    Medical Necessity is demonstrated by the following  History  Co-morbidities and personal factors that may impact the plan of care Co-morbidities:   anxiety, difficulty sleeping, education level, excessive commute time/distance, financial considerations, level of undertstanding of current condition and Hx Mandibular Fx bilaterally with jaw wiring in 2001    Personal Factors:   age  education level  social background  lifestyle     high   Examination  Body Structures and Functions, activity limitations and participation restrictions that may impact the plan of care Body Regions:   head  neck  back  upper extremities  trunk  Jaw    Body Systems:    gross symmetry  ROM  strength  gross coordinated movement  transitions  motor control  motor learning  myofascial and joint mobility, postural awareness and endurance    Participation Restrictions:   ADLs, HHC, self care, work and recreational activities, driving, sleeping    Activity limitations:   Learning and applying knowledge  no deficits    General Tasks and Commands  no deficits    Communication  no deficits    Mobility  driving (bike, car, motorcycle)    Self care  looking after one's health    Domestic Life  shopping  cooking  doing house work (cleaning house,  washing dishes, laundry)    Interactions/Relationships  no deficits    Life Areas  employment    Community and Social Life  community life  recreation and leisure         high   Clinical Presentation evolving clinical presentation with changing clinical characteristics moderate   Decision Making/ Complexity Score: moderate     Goals:  Short Term Goals: 6 weeks   1. Pt to improve mandibular opening by 5% to allow for increased ease with yawning.  2. Pt to improve scapular and deep neck flexor strength by a half grade to allow for improve postural alignment with work tasks.  3. Pt to improve cervical mobility by 5% to allow for increased with driving.  4. Pt to improve craniocervical strength by a half grade to allow for increased ease with chewing.  5. Pt to improve functional mobility with FOTO limitation <=33% disability.    Long Term Goals: 12 weeks   1. Pt to improve mandibular opening to WNL to allow for increased ease with yawning.  2. Pt to improve scapular and deep neck flexor strength to WNL to allow for improve postural alignment with work tasks.  3. Pt to improve cervical mobility by 10% to allow for increased with driving.  4. Pt to improve craniocervical strength by a full grade to alllow for increased ease with chewing.  5. Pt to improve functional mobility with FOTO limitation <=28% disability.        Plan   Plan of care Certification: 2/20/2020 to 05/20/2020.    Outpatient Physical Therapy 2 times weekly for 12 weeks to include the following interventions: Aquatic Therapy, Cervical/Lumbar Traction, Electrical Stimulation prn, Iontophoresis (with dexamethasone prn), Manual Therapy, Moist Heat/ Ice, Neuromuscular Re-ed, Patient Education, Self Care, Therapeutic Activites, Therapeutic Exercise and IASTYM, therapeutic taping, dry needling, cupping. Progress HEP towards D/C. Recommend F/U with MD if symptoms worsen or do not resolve. Patient may be seen by a PTA for treatment to carry out their plan of  care.  Face-to-face conferences will be held.        Yolanda Bowser, PT

## 2020-02-27 ENCOUNTER — CLINICAL SUPPORT (OUTPATIENT)
Dept: REHABILITATION | Facility: OTHER | Age: 40
End: 2020-02-27
Attending: OBSTETRICS & GYNECOLOGY
Payer: COMMERCIAL

## 2020-02-27 DIAGNOSIS — R29.3 POSTURE IMBALANCE: ICD-10-CM

## 2020-02-27 DIAGNOSIS — G89.29 CHRONIC TMJ PAIN: ICD-10-CM

## 2020-02-27 DIAGNOSIS — G44.229 CHRONIC TENSION-TYPE HEADACHE, NOT INTRACTABLE: ICD-10-CM

## 2020-02-27 DIAGNOSIS — M26.629 CHRONIC TMJ PAIN: ICD-10-CM

## 2020-02-27 PROCEDURE — 97110 THERAPEUTIC EXERCISES: CPT | Mod: PN | Performed by: PHYSICAL THERAPIST

## 2020-02-27 PROCEDURE — 97140 MANUAL THERAPY 1/> REGIONS: CPT | Mod: PN | Performed by: PHYSICAL THERAPIST

## 2020-02-27 NOTE — PROGRESS NOTES
"  Physical Therapy Daily Treatment Note     Name: Cynthia Dailey  Clinic Number: [id number]    Therapy Diagnosis:   Encounter Diagnoses   Name Primary?    Chronic TMJ pain     Posture imbalance     Chronic tension-type headache, not intractable      Physician: Rito Alvarado MD    Visit Date: 2/27/2020    Physician Orders: PT Eval and Treat   Medical Diagnosis from Referral:   M62.838 (ICD-10-CM) - Spasm of muscle   S03.00XS (ICD-10-CM) - Dislocation of temporomandibular joint, sequela   Evaluation Date: 2/20/2020  Authorization Period Expiration: 12/31/2020  Plan of Care Expiration: 05/20/2020  Visit # / Visits authorized: 2/ 10      Time In: 9:05 AM  Time Out: 10:05 AM  Total Billable Time: 60 minutes    Precautions: Standard, anxiety, depression, asthma     Subjective     Pt reports: no change today. "It's not crazy, it's just sore like usual."  She was compliant with home exercise program.  Response to previous treatment: soreness with needling  Functional change: no change    Pain: 6/10  Location: bilateral (R>L) head, face (lateral, temporal, mandibular), neck, TMJ (behind the ear)     Objective     Cynthia received therapeutic exercises to develop strength, flexibility and posture for 25 minutes including:  Chin tucks 5" x 20  Still point inducer x 3 min  Chin tucks on still point inducer 20x  Scap squeezes 5" x 20  UT stretch 2 x 30"  LS 2 x 30"  Prone scap squeezes 10" x 10  Prone Y/T/A 3" x 20      Cynthia received the following manual therapy techniques: Soft tissue Mobilization were applied to the: CSP and TMJ for 25 minutes, including:  Application of TDN: Pt educated on benefits and potential side effects of dry needling. Educated pt on benefits, precautions, side effects following TDN. Educated pt to use heat following treatment sessions if pt is experiencing pain or soreness. Pt verbalized good understanding of education.  Pt signed written consent to dry needling. Pt gave verbal consent for DN    Pt " received dry needling to the below listed muscles using 15,30, and 50 mm needles.  In prone: B suboccipitals (GB20, 12, GV16), UT, LS  In supine: B masseter, pterygoids, temporalis  Winding performed every 5 minutes during treatment.      Cynthia received hot pack for 10 minutes to CSP.          Home Exercises Provided and Patient Education Provided     Education provided:   - Therapy rationale and plan of care    Written Home Exercises Provided: yes.  Exercises were reviewed and Cynthia was able to demonstrate them prior to the end of the session.  Cynthia demonstrated good  understanding of the education provided.     See EMR under Patient Instructions for exercises provided 2/20/2020.    Assessment     Good tolerance to initial treatment session. Verbal and tactile cuing for technique with new therex. Dry needling continued with pt's consent. Good soft tissue response to dry needling evident by increased grasp with unilateral winding at all insertion points. Winding performed every 5 minutes during treatment. No adverse effects following treatment.    Cynthia is progressing well towards her goals.   Pt prognosis is Good.     Pt will continue to benefit from skilled outpatient physical therapy to address the deficits listed in the problem list box on initial evaluation, provide pt/family education and to maximize pt's level of independence in the home and community environment.     Pt's spiritual, cultural and educational needs considered and pt agreeable to plan of care and goals.     Anticipated barriers to physical therapy: Traumatic Hx of mandibular Fx, chronicity of symptoms    Goals: IE 2/20/2020  Short Term Goals: 6 weeks   1. Pt to improve mandibular opening by 5% to allow for increased ease with yawning. Progressing, not met  2. Pt to improve scapular and deep neck flexor strength by a half grade to allow for improve postural alignment with work tasks. Progressing, not met  3. Pt to improve cervical mobility by  5% to allow for increased with driving. Progressing, not met  4. Pt to improve craniocervical strength by a half grade to allow for increased ease with chewing. Progressing, not met  5. Pt to improve functional mobility with FOTO limitation <=33% disability. Progressing, not met     Long Term Goals: 12 weeks   1. Pt to improve mandibular opening to WNL to allow for increased ease with yawning. Progressing, not met  2. Pt to improve scapular and deep neck flexor strength to WNL to allow for improve postural alignment with work tasks. Progressing, not met  3. Pt to improve cervical mobility by 10% to allow for increased with driving. Progressing, not met  4. Pt to improve craniocervical strength by a full grade to alllow for increased ease with chewing. Progressing, not met   5. Pt to improve functional mobility with FOTO limitation <=28% disability.Progressing, not met      Plan   Plan of care Certification: 2/20/2020 to 05/20/2020.     Continue plan of care with focus on improved soft tissue mobility, with dry needling as needed. Progress therex to improve scapular strength and postural stability.     Elzbieta Olguin, PT

## 2020-03-03 NOTE — PROGRESS NOTES
"  Physical Therapy Daily Treatment Note     Name: Cynthia Dailey  Clinic Number: [id number]    Therapy Diagnosis:   Encounter Diagnoses   Name Primary?    Chronic TMJ pain     Posture imbalance     Chronic tension-type headache, not intractable      Physician: Rito Alvarado MD    Visit Date: 3/4/2020    Physician Orders: PT Eval and Treat   Medical Diagnosis from Referral:   M62.838 (ICD-10-CM) - Spasm of muscle   S03.00XS (ICD-10-CM) - Dislocation of temporomandibular joint, sequela   Evaluation Date: 2/20/2020  Authorization Period Expiration: 12/31/2020  Plan of Care Expiration: 05/20/2020  Visit # / Visits authorized: 2/ 10      Time In: 3:00 PM  Time Out: 4:00 PM  Total Billable Time: 60 minutes    Precautions: Standard, anxiety, depression, asthma     Subjective     Pt reports: getting a headache after the previous session. Symptoms are the same in the R>L jaw.  She was compliant with home exercise program.  Response to previous treatment: soreness with needling  Functional change: no change    Pain: 6/10  Location: bilateral (R>L) head, face (lateral, temporal, mandibular), neck, TMJ (behind the ear)     Objective     Cynthia received therapeutic exercises to develop strength, flexibility and posture for 25 minutes including:    +isometric jaw opening 10 x 5"  +self joint mob with popsicle sticks: 15 x 5" (2-3 sticks)  Chin tucks 5" x 20  Still point inducer x 3 min  Chin tucks on still point inducer 20x  Scap squeezes 5" x 20  UT stretch 2 x 30"  LS 2 x 30"  Prone scap squeezes 10" x 10  Prone Y/T/A 3" x 20      Cynthia received the following manual therapy techniques: Soft tissue Mobilization were applied to the: CSP and TMJ for 25 minutes, including:  Application of TDN: Pt educated on benefits and potential side effects of dry needling. Educated pt on benefits, precautions, side effects following TDN. Educated pt to use heat following treatment sessions if pt is experiencing pain or soreness. Pt verbalized " good understanding of education.  Pt signed written consent to dry needling. Pt gave verbal consent for DN    Pt received dry needling to the below listed muscles using 15,30, and 50 mm needles.  In prone: B suboccipitals (GB20, 12, GV16), UT, LS  In supine: B masseter, pterygoids, temporalis  Winding performed every 5 minutes during treatment.      Cynthia received hot pack for 10 minutes to CSP.          Home Exercises Provided and Patient Education Provided     Education provided:   - Therapy rationale and plan of care    Written Home Exercises Provided: yes.  Exercises were reviewed and Cynthia was able to demonstrate them prior to the end of the session.  Cynthia demonstrated good  understanding of the education provided.     See EMR under Patient Instructions for exercises provided 2/20/2020.    Assessment     Multiple deflections with deviation to R with AROM jaw opening/closing at beginning of session with crepitus. 1 audible pop with reduced crepitus and 1 deflection after dry needling. Encouraged self joint distraction using popsicle sticks to improve joint mobility and reduce deviations.    Cynthia is progressing well towards her goals.   Pt prognosis is Good.     Pt will continue to benefit from skilled outpatient physical therapy to address the deficits listed in the problem list box on initial evaluation, provide pt/family education and to maximize pt's level of independence in the home and community environment.     Pt's spiritual, cultural and educational needs considered and pt agreeable to plan of care and goals.     Anticipated barriers to physical therapy: Traumatic Hx of mandibular Fx, chronicity of symptoms    Goals: IE 2/20/2020  Short Term Goals: 6 weeks   1. Pt to improve mandibular opening by 5% to allow for increased ease with yawning. Progressing, not met  2. Pt to improve scapular and deep neck flexor strength by a half grade to allow for improve postural alignment with work tasks. Progressing,  not met  3. Pt to improve cervical mobility by 5% to allow for increased with driving. Progressing, not met  4. Pt to improve craniocervical strength by a half grade to allow for increased ease with chewing. Progressing, not met  5. Pt to improve functional mobility with FOTO limitation <=33% disability. Progressing, not met     Long Term Goals: 12 weeks   1. Pt to improve mandibular opening to WNL to allow for increased ease with yawning. Progressing, not met  2. Pt to improve scapular and deep neck flexor strength to WNL to allow for improve postural alignment with work tasks. Progressing, not met  3. Pt to improve cervical mobility by 10% to allow for increased with driving. Progressing, not met  4. Pt to improve craniocervical strength by a full grade to alllow for increased ease with chewing. Progressing, not met   5. Pt to improve functional mobility with FOTO limitation <=28% disability.Progressing, not met      Plan   Plan of care Certification: 2/20/2020 to 05/20/2020.     Continue plan of care with focus on improved soft tissue mobility, with dry needling as needed. Progress therex to improve scapular strength and postural stability.     Yolanda Bowser, PT

## 2020-03-04 ENCOUNTER — CLINICAL SUPPORT (OUTPATIENT)
Dept: REHABILITATION | Facility: OTHER | Age: 40
End: 2020-03-04
Attending: OBSTETRICS & GYNECOLOGY
Payer: COMMERCIAL

## 2020-03-04 DIAGNOSIS — M26.629 CHRONIC TMJ PAIN: ICD-10-CM

## 2020-03-04 DIAGNOSIS — G44.229 CHRONIC TENSION-TYPE HEADACHE, NOT INTRACTABLE: ICD-10-CM

## 2020-03-04 DIAGNOSIS — R29.3 POSTURE IMBALANCE: ICD-10-CM

## 2020-03-04 DIAGNOSIS — G89.29 CHRONIC TMJ PAIN: ICD-10-CM

## 2020-03-04 PROCEDURE — 97110 THERAPEUTIC EXERCISES: CPT | Mod: PN

## 2020-03-04 PROCEDURE — 97140 MANUAL THERAPY 1/> REGIONS: CPT | Mod: PN

## 2020-03-11 NOTE — PROGRESS NOTES
"  Physical Therapy Daily Treatment Note     Name: Cynthia Dailey  Clinic Number: [id number]    Therapy Diagnosis:   Encounter Diagnoses   Name Primary?    Chronic TMJ pain     Posture imbalance     Chronic tension-type headache, not intractable      Physician: Rito Alvarado MD    Visit Date: 3/12/2020    Physician Orders: PT Eval and Treat   Medical Diagnosis from Referral:   M62.838 (ICD-10-CM) - Spasm of muscle   S03.00XS (ICD-10-CM) - Dislocation of temporomandibular joint, sequela   Evaluation Date: 2/20/2020  Authorization Period Expiration: 12/31/2020  Plan of Care Expiration: 05/20/2020  Visit # / Visits authorized: 4/ 10      Time In: 9:10 AM  Time Out: 10:10 AM  Total Billable Time: 60 minutes    Precautions: Standard, anxiety, depression, asthma     Subjective     Pt reports: feeling very tight this morning. She says she's been trying the popsicle stick exercise, but feels a lot of pinching in the joint with it. She says she got a migraine after last visit. She reports that her jaw does feel looser for about a day following dry needling.   She was compliant with home exercise program.  Response to previous treatment: decreased tightness with needling  Functional change: no change    Pain: 6/10  Location: bilateral (R>L) head, face (lateral, temporal, mandibular), neck, TMJ (behind the ear)     Objective     Cynthia received therapeutic exercises to develop strength, flexibility and posture for 25 minutes including:    isometric jaw opening 10 x 5"  self joint mob with popsicle sticks: 15 x 5" (2-3 sticks)  Chin tucks 5" x 20  Still point inducer x 3 min  Chin tucks on still point inducer 20x  Scap squeezes 5" x 20  UT stretch 2 x 30"  LS 2 x 30"  Prone scap squeezes 10" x 10  Prone Y/T/A 3" x 20      Cynthia received the following manual therapy techniques: Soft tissue Mobilization were applied to the: CSP and TMJ for 25 minutes, including:  Application of TDN: Pt educated on benefits and potential side " effects of dry needling. Educated pt on benefits, precautions, side effects following TDN. Educated pt to use heat following treatment sessions if pt is experiencing pain or soreness. Pt verbalized good understanding of education.  Pt signed written consent to dry needling. Pt gave verbal consent for DN    Pt received dry needling to the below listed muscles using 15,30, and 50 mm needles.  In prone: B suboccipitals (GB20, 12, GV16), UT, LS  In supine: B masseter, pterygoids, temporalis  Winding performed every 5 minutes during treatment.      Cynthia received hot pack for 10 minutes to CSP.          Home Exercises Provided and Patient Education Provided     Education provided:   - Therapy rationale and plan of care    Written Home Exercises Provided: yes.  Exercises were reviewed and Cynthia was able to demonstrate them prior to the end of the session.  Cynthia demonstrated good  understanding of the education provided.     See EMR under Patient Instructions for exercises provided 2/20/2020.    Assessment     Good tolerance to treatment today. Verbal and tactile cuing required for technique with stretches. Good soft tissue response to dry needling evident by increased grasp with unilateral winding at all insertion points, particularly pterygoids and R LS. Winding performed every 5 minutes during treatment. No adverse effects following treatment.    Cynthia is progressing well towards her goals.   Pt prognosis is Good.     Pt will continue to benefit from skilled outpatient physical therapy to address the deficits listed in the problem list box on initial evaluation, provide pt/family education and to maximize pt's level of independence in the home and community environment.     Pt's spiritual, cultural and educational needs considered and pt agreeable to plan of care and goals.     Anticipated barriers to physical therapy: Traumatic Hx of mandibular Fx, chronicity of symptoms    Goals: IE 2/20/2020  Short Term Goals: 6  weeks   1. Pt to improve mandibular opening by 5% to allow for increased ease with yawning. Progressing, not met  2. Pt to improve scapular and deep neck flexor strength by a half grade to allow for improve postural alignment with work tasks. Progressing, not met  3. Pt to improve cervical mobility by 5% to allow for increased with driving. Progressing, not met  4. Pt to improve craniocervical strength by a half grade to allow for increased ease with chewing. Progressing, not met  5. Pt to improve functional mobility with FOTO limitation <=33% disability. Progressing, not met     Long Term Goals: 12 weeks   1. Pt to improve mandibular opening to WNL to allow for increased ease with yawning. Progressing, not met  2. Pt to improve scapular and deep neck flexor strength to WNL to allow for improve postural alignment with work tasks. Progressing, not met  3. Pt to improve cervical mobility by 10% to allow for increased with driving. Progressing, not met  4. Pt to improve craniocervical strength by a full grade to alllow for increased ease with chewing. Progressing, not met   5. Pt to improve functional mobility with FOTO limitation <=28% disability.Progressing, not met      Plan   Plan of care Certification: 2/20/2020 to 05/20/2020.     Continue plan of care with focus on improved soft tissue mobility, with dry needling as needed. Progress therex to improve scapular strength and postural stability.     Elzbieta Olguin, PT

## 2020-03-12 ENCOUNTER — CLINICAL SUPPORT (OUTPATIENT)
Dept: REHABILITATION | Facility: OTHER | Age: 40
End: 2020-03-12
Attending: OBSTETRICS & GYNECOLOGY
Payer: COMMERCIAL

## 2020-03-12 DIAGNOSIS — G89.29 CHRONIC TMJ PAIN: ICD-10-CM

## 2020-03-12 DIAGNOSIS — R29.3 POSTURE IMBALANCE: ICD-10-CM

## 2020-03-12 DIAGNOSIS — G44.229 CHRONIC TENSION-TYPE HEADACHE, NOT INTRACTABLE: ICD-10-CM

## 2020-03-12 DIAGNOSIS — M26.629 CHRONIC TMJ PAIN: ICD-10-CM

## 2020-03-12 PROCEDURE — 97110 THERAPEUTIC EXERCISES: CPT | Mod: PN | Performed by: PHYSICAL THERAPIST

## 2020-03-12 PROCEDURE — 97140 MANUAL THERAPY 1/> REGIONS: CPT | Mod: PN | Performed by: PHYSICAL THERAPIST

## 2020-04-08 ENCOUNTER — TELEPHONE (OUTPATIENT)
Dept: REHABILITATION | Facility: OTHER | Age: 40
End: 2020-04-08

## 2020-04-08 NOTE — TELEPHONE ENCOUNTER
Pt wanted to cancel her appointment on 4/15/2020 and be put on the wait list for May.  Offered pt virtual visits until we can reschedule her but pt declined opportunity.    Yolanda Bowser, PT   4/8/2020

## 2020-04-11 RX ORDER — FLUTICASONE PROPIONATE 44 UG/1
AEROSOL, METERED RESPIRATORY (INHALATION)
Qty: 31.8 G | Refills: 3 | Status: SHIPPED | OUTPATIENT
Start: 2020-04-11 | End: 2021-08-24

## 2020-04-20 ENCOUNTER — PATIENT MESSAGE (OUTPATIENT)
Dept: INTERNAL MEDICINE | Facility: CLINIC | Age: 40
End: 2020-04-20

## 2020-04-20 DIAGNOSIS — B35.3 TINEA PEDIS OF BOTH FEET: Primary | ICD-10-CM

## 2020-04-20 DIAGNOSIS — L30.9 DERMATITIS: ICD-10-CM

## 2020-04-21 ENCOUNTER — OFFICE VISIT (OUTPATIENT)
Dept: PODIATRY | Facility: CLINIC | Age: 40
End: 2020-04-21
Payer: COMMERCIAL

## 2020-04-21 VITALS
HEART RATE: 72 BPM | HEIGHT: 64 IN | SYSTOLIC BLOOD PRESSURE: 112 MMHG | WEIGHT: 136 LBS | DIASTOLIC BLOOD PRESSURE: 67 MMHG | TEMPERATURE: 97 F | BODY MASS INDEX: 23.22 KG/M2

## 2020-04-21 DIAGNOSIS — S90.424A BLISTER OF TOE OF RIGHT FOOT WITHOUT INFECTION, INITIAL ENCOUNTER: Primary | ICD-10-CM

## 2020-04-21 DIAGNOSIS — Z86.79 HISTORY OF RAYNAUD'S SYNDROME: ICD-10-CM

## 2020-04-21 DIAGNOSIS — B35.3 TINEA PEDIS OF BOTH FEET: ICD-10-CM

## 2020-04-21 DIAGNOSIS — M77.50 TENDINITIS OF FOOT: ICD-10-CM

## 2020-04-21 DIAGNOSIS — L60.3 DYSTROPHIC NAIL: ICD-10-CM

## 2020-04-21 PROCEDURE — 99999 PR PBB SHADOW E&M-EST. PATIENT-LVL IV: CPT | Mod: PBBFAC,,, | Performed by: PODIATRIST

## 2020-04-21 PROCEDURE — 99203 OFFICE O/P NEW LOW 30 MIN: CPT | Mod: S$GLB,,, | Performed by: PODIATRIST

## 2020-04-21 PROCEDURE — 99203 PR OFFICE/OUTPT VISIT, NEW, LEVL III, 30-44 MIN: ICD-10-PCS | Mod: S$GLB,,, | Performed by: PODIATRIST

## 2020-04-21 PROCEDURE — 99999 PR PBB SHADOW E&M-EST. PATIENT-LVL IV: ICD-10-PCS | Mod: PBBFAC,,, | Performed by: PODIATRIST

## 2020-04-21 NOTE — LETTER
April 21, 2020      Rito Alvarado MD  [address] [facility] [facility] [address] [place] [address]           [facility]  [address] [address]  Phone: [phone]  Fax: [phone]          Patient: Cynthia Dailey   MR Number: [id number]   YOB: 1980   Date of Visit: 4/21/2020       Dear Dr. Rito Alvarado:    Thank you for referring Cynthia Dailey to me for evaluation. Attached you will find relevant portions of my assessment and plan of care.    If you have questions, please do not hesitate to call me. I look forward to following Cynthia Dailey along with you.    Sincerely,    Courtney Castillo DPM    Enclosure  CC:  No Recipients    If you would like to receive this communication electronically, please contact [email or web address] or [phone] to request more information on "[employer] Link access.    For providers and/or their staff who would like to refer a patient to [facility], please contact us through our one-stop-shop provider referral line, [facility], at [phone].    If you feel you have received this communication in error or would no longer like to receive these types of communications, please e-mail [email or web address]

## 2020-04-21 NOTE — PROGRESS NOTES
Chief Complaint   Patient presents with    Callouses     Right great toe  PCP: Rito Alvarado 02/03/2020     Foot Pain     Left foot    Nail Problem     Fungus, Right foot           HPI:   Cynthia Dailey is a 39 y.o. female with complaints of  ?fungus on her toenails, mainly right 2nd and 3rd.  Also callus right hallux and blistering on the toes recently.  She has transitioned to a more structured shoe (compared to a minimalist running shoe) recently for running.  She does at least four miles, up to 10 miles a day.    She also does report left lateral foot pain for the past three weeks, worse with weight bearing and running but better when she rests.   She reports no acute injury and defers xrays today.   She also does bike.  She wears Injinji toe socks.    She reports a history of Raynauds.              Patient Active Problem List   Diagnosis    Mild intermittent asthma without complication    ADD (attention deficit disorder)    GERD (gastroesophageal reflux disease)    Anxiety    Avascular necrosis of bones of both hips    BRBPR (bright red blood per rectum)    Constipation    Spasm of muscle    TMJ (dislocation of temporomandibular joint)    Chronic TMJ pain    Posture imbalance    Chronic tension-type headache, not intractable             Current Outpatient Medications on File Prior to Visit   Medication Sig Dispense Refill    albuterol (PROVENTIL) 2.5 mg /3 mL (0.083 %) nebulizer solution Take 3 mLs (2.5 mg total) by nebulization every 4 (four) hours as needed for Wheezing. 1 Box 11    cyanocobalamin, vitamin B-12, (VITAMIN B-12) 1,000 mcg/mL Drop Take by mouth.      cyclobenzaprine (FLEXERIL) 10 MG tablet Take 1 tablet (10 mg total) by mouth nightly. 30 tablet 11    FLOVENT HFA 44 mcg/actuation inhaler USE 2 INHALATIONS TWICE A DAY 31.8 g 3    GAVILAX 17 gram/dose powder MIX AND DRINK 25 GRAMS ONCE DAILY 1428 g 3    hydrocortisone (WESTCORT) 0.2 % cream Apply topically once daily. 45 g 0     lorazepam (ATIVAN) 1 MG tablet Take by mouth every 6 (six) hours as needed for Anxiety.       metronidazole 0.75% (METROCREAM) 0.75 % Crea Apply topically 2 (two) times daily. 45 g 0    mometasone (NASONEX) 50 mcg/actuation nasal spray 2 sprays by Nasal route once daily. 17 g 1    ondansetron (ZOFRAN) 4 MG tablet Take 1 tablet (4 mg total) by mouth 2 (two) times daily. 30 tablet 0    ondansetron (ZOFRAN-ODT) 4 MG TbDL Take 1 tablet (4 mg total) by mouth every 8 (eight) hours as needed. 10 tablet 1    pantoprazole (PROTONIX) 40 MG tablet TAKE 1 TABLET DAILY 90 tablet 3    propranolol (INDERAL) 40 MG tablet       sodium chloride 0.9% 0.9 % SolP 50 mL with ketamine 100 mg/mL Soln 10 mg/mL Inject 2,267 mcg/kg/min into the vein every 30 days.      vitamin a palmitate-B-carotene 25,000 unit (15K-10K unit) Tab Take 1 tablet by mouth once daily.      vitamin D 1000 units Tab Take 1,000 Units by mouth once daily.       ZENZEDI 5 mg tablet        No current facility-administered medications on file prior to visit.            Review of patient's allergies indicates:   Allergen Reactions    Iodinated contrast media Anaphylaxis    Ciprofloxacin Rash           Social History     Socioeconomic History    Marital status: Single     Spouse name: Not on file    Number of children: Not on file    Years of education: Not on file    Highest education level: Not on file   Occupational History    Not on file   Social Needs    Financial resource strain: Not on file    Food insecurity:     Worry: Not on file     Inability: Not on file    Transportation needs:     Medical: Not on file     Non-medical: Not on file   Tobacco Use    Smoking status: Former Smoker     Packs/day: 0.75     Years: 8.00     Pack years: 6.00     Types: Cigarettes     Last attempt to quit: 2006     Years since quittin.4    Smokeless tobacco: Never Used   Substance and Sexual Activity    Alcohol use: Yes     Alcohol/week: 7.0  "standard drinks     Types: 7 Glasses of wine per week    Drug use: Yes     Types: Benzodiazepines, Marijuana    Sexual activity: Yes     Birth control/protection: Condom   Lifestyle    Physical activity:     Days per week: Not on file     Minutes per session: Not on file    Stress: Not on file   Relationships    Social connections:     Talks on phone: Not on file     Gets together: Not on file     Attends [employer] service: Not on file     Active member of club or organization: Not on file     Attends meetings of clubs or organizations: Not on file     Relationship status: Not on file   Other Topics Concern    Not on file   Social History Narrative    Not on file             ROS:   General ROS: negative for - chills, fever or night sweats  Respiratory ROS: no cough, shortness of breath, or wheezing  Cardiovascular ROS: no chest pain or dyspnea on exertion  Musculoskeletal ROS: positive for - pain in foot - left  Neurological ROS: no TIA or stroke symptoms  Dermatological ROS: positive for nail changes        EXAM:     Vitals:    04/21/20 1212   BP: 112/67   Pulse: 72   Temp: 97.1 °F (36.2 °C)   Weight: 61.7 kg (136 lb)   Height: 5' 4" (1.626 m)        General:  Alert, oriented, no acute distress      Bilateral  Lower extremity exam:    Vascular:   Dorsalis Pedis:  present   Posterior Tibial:  present  Capillary refill time:  5 seconds  Temperature of toes cold to touch  Edema:  none       Neurological:     Sharp touch:  normal  Light touch: normal  Tinels Sign:  Absent  Mulders Click:   Absent        Dermatological:   Skin: Normal tone and turgor  Wounds/Ulcers:  No open wounds but blistering without infection noted to the right hallux, right 2nd and 3rd toes.  Superficial with scant blister fluid.   Bruising:  Absent  Erythema:  Present to toes right > left likely from Raynauds.    All toenails normal appearing except for thick right 2nd toenail.  Proximal nail border is incurvated.  No paronychia.  Toenails " all short.   Skin on distal edge of nails is slightly hyperkeratotic.  No vesicles noted .   No interdigital maceration.  No peeling skin on the plantar aspects         Musculoskeletal:   Metatarsophalangeal range of motion:   full range of motion  Subtalar joint range of motion: full range of motion  Ankle joint range of motion:  full range of motion  Bunions:  Absent  Hammertoes: Absent  bilateral pes planus.   No tenderness to palpation lateral left foot.  No increased temperature or swelling noted.               ASSESSMENT/PLAN:          Problem List Items Addressed This Visit     None      Visit Diagnoses     Blister of toe of right foot without infection, initial encounter    -  Primary    Tinea pedis of both feet        History of Raynaud's syndrome        Dystrophic nail        Tendinitis of foot                · I counseled the patient on the patient's conditions, their implications and medical management.    · Consider Kerasal Nail.  Available OTC.  Use daily for at least 6-8 months on the toenails.       General nail care measures for abnormal nails include:    ? Keeping nails trimmed short    ? Avoiding trauma     ? Avoiding contact irritants     ? Keeping nails dry (avoiding wet work)    ? Avoiding all nail cosmetics     Shoe and activity modification as needed for relief.   Consider custom foot orthotics, or try pre-genet OTC arch supports.   Moleskin to areas that may be prone to blistering when running.   Patient is amenable to plan.   Call or return to clinic prn if these symptoms worsen or fail to improve as anticipated.   Patient is amenable to plan.

## 2020-06-08 ENCOUNTER — OFFICE VISIT (OUTPATIENT)
Dept: DERMATOLOGY | Facility: CLINIC | Age: 40
End: 2020-06-08
Payer: COMMERCIAL

## 2020-06-08 DIAGNOSIS — L30.9 DERMATITIS: ICD-10-CM

## 2020-06-08 DIAGNOSIS — L71.0 PERIORAL DERMATITIS: Primary | ICD-10-CM

## 2020-06-08 PROCEDURE — 99202 PR OFFICE/OUTPT VISIT, NEW, LEVL II, 15-29 MIN: ICD-10-PCS | Mod: S$GLB,,, | Performed by: DERMATOLOGY

## 2020-06-08 PROCEDURE — 99999 PR PBB SHADOW E&M-EST. PATIENT-LVL III: ICD-10-PCS | Mod: PBBFAC,,, | Performed by: DERMATOLOGY

## 2020-06-08 PROCEDURE — 99202 OFFICE O/P NEW SF 15 MIN: CPT | Mod: S$GLB,,, | Performed by: DERMATOLOGY

## 2020-06-08 PROCEDURE — 99999 PR PBB SHADOW E&M-EST. PATIENT-LVL III: CPT | Mod: PBBFAC,,, | Performed by: DERMATOLOGY

## 2020-06-08 RX ORDER — SODIUM SULFACETAMIDE AND SULFUR 80; 40 MG/ML; MG/ML
SOLUTION TOPICAL
Qty: 1 BOTTLE | Refills: 5 | Status: SHIPPED | OUTPATIENT
Start: 2020-06-08 | End: 2023-01-05

## 2020-06-08 RX ORDER — DOXYCYCLINE HYCLATE 50 MG/1
50 CAPSULE ORAL NIGHTLY
Qty: 30 CAPSULE | Refills: 0 | Status: SHIPPED | OUTPATIENT
Start: 2020-06-08 | End: 2020-07-08

## 2020-06-08 RX ORDER — METRONIDAZOLE 10 MG/G
GEL TOPICAL
Qty: 60 G | Refills: 6 | Status: SHIPPED | OUTPATIENT
Start: 2020-06-08 | End: 2020-06-16 | Stop reason: SDUPTHER

## 2020-06-08 NOTE — PATIENT INSTRUCTIONS
Perioral dermatitis  -     doxycycline (VIBRAMYCIN) 50 MG capsule; Take 1 capsule (50 mg total) by mouth every evening. When flaring take for 10 days  Dispense: 30 capsule; Refill: 0  -     metronidazole 1% (METROGEL) 1 % Gel; AAA face qday  Dispense: 60 g; Refill: 6  -     sulfacetamide sodium-sulfur (SULFACLEANSE 8-4) 8-4 % Susp; Wash face qd - bid  Dispense: 1 Bottle; Refill: 5    - Mainly due to underlying inflammatory process triggering papules including heat and the sun, encouraged patient to avoid triggers including hot and spicy foods, heat, sun, sweating (as much as possible)  - Avoid cinnamon products   - wash face after brushing teeth  - Start [place] posay gentle wash or sulfa wash as prescribed BID, use cerave am in am, in the evening use cerave pm  - start Metrogel BID, once clear use Metrogel twice a week  -If severe flare start doxycycline 50 mg daily for 10 days  -Discussed benefits and risks of doxycyline therapy including but not limited to GI discomfort, esophageal irritation/ulceration, and increased sun sensitivity. Patient was counseled to take medicine with meals and at least 1 hour before lying down.

## 2020-06-08 NOTE — PROGRESS NOTES
Subjective:       Patient ID:  Cynthia Dailey is a 39 y.o. female who presents for   Chief Complaint   Patient presents with    Rash     facial     History of Present Illness: The patient presents with chief complaint of rash.  Location: FACE  Duration: 10 MONTHS  Signs/Symptoms: ITCHY, ROUGH    Prior treatments: FACE WASH, LOTION, CORTISONE 10      Review of Systems   Constitutional: Negative for fever.   HENT: Negative for congestion, rhinorrhea and postnasal drip.    Eyes: Negative for itching.   Skin: Positive for itching, rash, dry skin, daily sunscreen use and wears hat. Negative for activity-related sunscreen use and recent sunburn.   Hematologic/Lymphatic: Bruises/bleeds easily.   Allergic/Immunologic: Positive for environmental allergies.        Objective:    Physical Exam   Constitutional: She appears well-developed and well-nourished.   Neurological: She is alert and oriented to person, place, and time.   Psychiatric: She has a normal mood and affect.   Skin:   Areas Examined (abnormalities noted in diagram):   Scalp / Hair Palpated and Inspected  Head / Face Inspection Performed  Neck Inspection Performed              Diagram Legend     Erythematous scaling macule/papule c/w actinic keratosis       Vascular papule c/w angioma      Pigmented verrucoid papule/plaque c/w seborrheic keratosis      Yellow umbilicated papule c/w sebaceous hyperplasia      Irregularly shaped tan macule c/w lentigo     1-2 mm smooth white papules consistent with Milia      Movable subcutaneous cyst with punctum c/w epidermal inclusion cyst      Subcutaneous movable cyst c/w pilar cyst      Firm pink to brown papule c/w dermatofibroma      Pedunculated fleshy papule(s) c/w skin tag(s)      Evenly pigmented macule c/w junctional nevus     Mildly variegated pigmented, slightly irregular-bordered macule c/w mildly atypical nevus      Flesh colored to evenly pigmented papule c/w intradermal nevus       Pink pearly papule/plaque c/w  basal cell carcinoma      Erythematous hyperkeratotic cursted plaque c/w SCC      Surgical scar with no sign of skin cancer recurrence      Open and closed comedones      Inflammatory papules and pustules      Verrucoid papule consistent consistent with wart     Erythematous eczematous patches and plaques     Dystrophic onycholytic nail with subungual debris c/w onychomycosis     Umbilicated papule    Erythematous-base heme-crusted tan verrucoid plaque consistent with inflamed seborrheic keratosis     Erythematous Silvery Scaling Plaque c/w Psoriasis     See annotation      Assessment / Plan:        Perioral dermatitis  -     doxycycline (VIBRAMYCIN) 50 MG capsule; Take 1 capsule (50 mg total) by mouth every evening. When flaring take for 10 days  Dispense: 30 capsule; Refill: 0  -     metronidazole 1% (METROGEL) 1 % Gel; AAA face qday  Dispense: 60 g; Refill: 6  -     sulfacetamide sodium-sulfur (SULFACLEANSE 8-4) 8-4 % Susp; Wash face qd - bid  Dispense: 1 Bottle; Refill: 5    - Mainly due to underlying inflammatory process triggering papules including heat and the sun, encouraged patient to avoid triggers including hot and spicy foods, heat, sun, sweating (as much as possible)  - Avoid cinnamon products   - wash face after brushing teeth  - Start [place] posay gentle wash or sulfa wash as prescribed BID, use cerave am in am, in the evening use cerave pm  - start Metrogel BID, once clear use Metrogel twice a week  -If severe flare start doxycycline 50 mg daily for 10 days  -Discussed benefits and risks of doxycyline therapy including but not limited to GI discomfort, esophageal irritation/ulceration, and increased sun sensitivity. Patient was counseled to take medicine with meals and at least 1 hour before lying down.       Dermatitis  -     Ambulatory referral/consult to Dermatology      F/u 3 months         No follow-ups on file.

## 2020-06-08 NOTE — LETTER
June 8, 2020      Rito Alvarado MD  [address] [facility] [place] [address]           [facility]  [address]  [place] [address]  Phone: [phone]  Fax: [phone]          Patient: Cynthia Dailey   MR Number: [id number]   YOB: 1980   Date of Visit: 6/8/2020       Dear Dr. Rito Alvarado:    Thank you for referring Cynthia Dailey to me for evaluation. Attached you will find relevant portions of my assessment and plan of care.    If you have questions, please do not hesitate to call me. I look forward to following Cynthia Dailey along with you.    Sincerely,    Teresita Frances MD    Enclosure  CC:  No Recipients    If you would like to receive this communication electronically, please contact [email or web address] or [phone] to request more information on [employer] Link access.    For providers and/or their staff who would like to refer a patient to [facility], please contact us through our one-stop-shop provider referral line, [email or web address] at [phone].    If you feel you have received this communication in error or would no longer like to receive these types of communications, please e-mail [email or web address]

## 2020-06-16 DIAGNOSIS — L71.0 PERIORAL DERMATITIS: ICD-10-CM

## 2020-06-16 RX ORDER — METRONIDAZOLE 10 MG/G
GEL TOPICAL
Qty: 60 G | Refills: 6 | Status: CANCELLED | OUTPATIENT
Start: 2020-06-16

## 2020-06-16 RX ORDER — METRONIDAZOLE 10 MG/G
GEL TOPICAL
Qty: 60 G | Refills: 6 | Status: SHIPPED | OUTPATIENT
Start: 2020-06-16 | End: 2021-02-04

## 2020-09-18 ENCOUNTER — PATIENT MESSAGE (OUTPATIENT)
Dept: INTERNAL MEDICINE | Facility: CLINIC | Age: 40
End: 2020-09-18

## 2020-10-08 ENCOUNTER — PATIENT MESSAGE (OUTPATIENT)
Dept: INTERNAL MEDICINE | Facility: CLINIC | Age: 40
End: 2020-10-08

## 2020-10-08 DIAGNOSIS — M25.579 ANKLE PAIN, UNSPECIFIED CHRONICITY, UNSPECIFIED LATERALITY: Primary | ICD-10-CM

## 2020-10-11 ENCOUNTER — PATIENT OUTREACH (OUTPATIENT)
Dept: ADMINISTRATIVE | Facility: OTHER | Age: 40
End: 2020-10-11

## 2020-10-12 ENCOUNTER — OFFICE VISIT (OUTPATIENT)
Dept: ORTHOPEDICS | Facility: CLINIC | Age: 40
End: 2020-10-12
Payer: COMMERCIAL

## 2020-10-12 ENCOUNTER — HOSPITAL ENCOUNTER (OUTPATIENT)
Dept: RADIOLOGY | Facility: HOSPITAL | Age: 40
Discharge: HOME | End: 2020-10-12
Attending: PHYSICIAN ASSISTANT
Payer: COMMERCIAL

## 2020-10-12 ENCOUNTER — TELEPHONE (OUTPATIENT)
Dept: ORTHOPEDICS | Facility: CLINIC | Age: 40
End: 2020-10-12

## 2020-10-12 VITALS — HEIGHT: 64 IN | BODY MASS INDEX: 21.85 KG/M2 | WEIGHT: 128 LBS

## 2020-10-12 DIAGNOSIS — M25.571 RIGHT ANKLE PAIN, UNSPECIFIED CHRONICITY: ICD-10-CM

## 2020-10-12 DIAGNOSIS — S82.831A CLOSED FRACTURE OF DISTAL END OF RIGHT FIBULA, INITIAL ENCOUNTER: Primary | ICD-10-CM

## 2020-10-12 PROCEDURE — 99203 OFFICE O/P NEW LOW 30 MIN: CPT | Mod: S$GLB,,, | Performed by: PHYSICIAN ASSISTANT

## 2020-10-12 PROCEDURE — 73610 XR ANKLE COMPLETE 3 VIEW RIGHT: ICD-10-PCS | Mod: 26,RT,, | Performed by: RADIOLOGY

## 2020-10-12 PROCEDURE — 73610 X-RAY EXAM OF ANKLE: CPT | Mod: TC,RT

## 2020-10-12 PROCEDURE — 99999 PR PBB SHADOW E&M-EST. PATIENT-LVL IV: CPT | Mod: PBBFAC,,, | Performed by: PHYSICIAN ASSISTANT

## 2020-10-12 PROCEDURE — 99999 PR PBB SHADOW E&M-EST. PATIENT-LVL IV: ICD-10-PCS | Mod: PBBFAC,,, | Performed by: PHYSICIAN ASSISTANT

## 2020-10-12 PROCEDURE — 73610 X-RAY EXAM OF ANKLE: CPT | Mod: 26,RT,, | Performed by: RADIOLOGY

## 2020-10-12 PROCEDURE — 99203 PR OFFICE/OUTPT VISIT, NEW, LEVL III, 30-44 MIN: ICD-10-PCS | Mod: S$GLB,,, | Performed by: PHYSICIAN ASSISTANT

## 2020-10-12 RX ORDER — HYDROCODONE BITARTRATE AND ACETAMINOPHEN 10; 325 MG/1; MG/1
1 TABLET ORAL 3 TIMES DAILY
Qty: 21 TABLET | Refills: 0 | Status: SHIPPED | OUTPATIENT
Start: 2020-10-12 | End: 2020-10-13 | Stop reason: SDUPTHER

## 2020-10-12 NOTE — PROGRESS NOTES
"Subjective:      Patient ID: Cynthia Dailey is a 40 y.o. female.    Chief Complaint: Pain of the Right Ankle    HPI  40 year old female presents with chief complaint of right ankle pain x 4 weeks. She denies trauma. She is a runner who runs about 5-8 miles/ day. While running 4 weeks ago, around the 5th mile "something wasn't right" with the ankle and she started having some pain. She finished her run. The ankle became swollen. She went to urgent care and was told that her x-rays were negative. She has been ace wrapping her ankle. She still reports swelling. Pain is at the lateral ankle and it radiates into her calf and she has some pain at the anterior aspect that goes up into the lower leg. She can't run. She has pain with walking, swimming, and prolonged standing. She took motrin and toradol with no relief. She has been biking. Her ankle feels weak with stairs.   Review of Systems   Constitution: Negative for chills, fever and night sweats.   Cardiovascular: Negative for chest pain.   Respiratory: Negative for cough and shortness of breath.    Hematologic/Lymphatic: Does not bruise/bleed easily.   Skin: Negative for color change.   Gastrointestinal: Negative for heartburn.   Genitourinary: Negative for dysuria.   Neurological: Negative for numbness and paresthesias.   Psychiatric/Behavioral: Negative for altered mental status.   Allergic/Immunologic: Negative for persistent infections.         Objective:            General    Vitals reviewed.  Constitutional: She is oriented to person, place, and time. She appears well-developed and well-nourished.   Cardiovascular: Normal rate.    Neurological: She is alert and oriented to person, place, and time.         Right Ankle/Foot Exam     Inspection   Erythema: absent    Range of Motion   The patient has normal right ankle ROM.  Miranda Test:  negative    Tests   Anterior drawer: negative    Other   Sensation: normal    Comments:  TTP distal fibula.         Vascular Exam "     Right Pulses  Dorsalis Pedis:      2+              X-ray: ordered and reviewed by myself. Subacute nondisplaced stress fx seen at the distal fibula shaft with some signs of healing.         Assessment:       Encounter Diagnoses   Name Primary?    Closed fracture of distal end of right fibula, initial encounter Yes    Right ankle pain, unspecified chronicity           Plan:       Discussed treatment options with patient. She was placed in a boot. She is WBAT. Elevate and ice. Ankle ROM exercises as tolerated. Norco 10 mg sent to pharmacy. RTC in 2 weeks for repeat x-ray.

## 2020-10-12 NOTE — PROGRESS NOTES
Requested updates within Care Everywhere.  Patient's chart was reviewed for overdue YAAKOV topics.  Immunizations reconciled.    Orders placed:n/a  Tasked appts:n/a  Labs Linked:n/a

## 2020-10-12 NOTE — TELEPHONE ENCOUNTER
Spoken with patient about her prescription. Explained that [facility] sent her medication in, but sometimes it doesn't go through. BH resent pain medication to pharmacy and verified that it was received.

## 2020-10-12 NOTE — LETTER
October 12, 2020      Cr Thomas, DO  [address] [facility] [place] [address]           [facility] 5th Fl  [address]  [facility] [address]  Phone: [phone]          Patient: Cynthia Dailey   MR Number: [id number]   YOB: 1980   Date of Visit: 10/12/2020       Dear Dr. Cr Thoams:    Thank you for referring Cynthia Dailey to me for evaluation. Attached you will find relevant portions of my assessment and plan of care.    If you have questions, please do not hesitate to call me. I look forward to following Cynthia Dailey along with you.    Sincerely,    Denise Soria PA-C    Enclosure  CC:  No Recipients    If you would like to receive this communication electronically, please contact [email or web address] or [phone] to request more information on [employer] Link access.    For providers and/or their staff who would like to refer a patient to [facility], please contact us through our one-stop-shop provider referral line, [facility], at [phone].    If you feel you have received this communication in error or would no longer like to receive these types of communications, please e-mail [email or web address]

## 2020-10-13 ENCOUNTER — TELEPHONE (OUTPATIENT)
Dept: ORTHOPEDICS | Facility: CLINIC | Age: 40
End: 2020-10-13

## 2020-10-13 DIAGNOSIS — S82.831A CLOSED FRACTURE OF DISTAL END OF RIGHT FIBULA, INITIAL ENCOUNTER: ICD-10-CM

## 2020-10-13 RX ORDER — HYDROCODONE BITARTRATE AND ACETAMINOPHEN 10; 325 MG/1; MG/1
1 TABLET ORAL 3 TIMES DAILY
Qty: 21 TABLET | Refills: 0 | Status: SHIPPED | OUTPATIENT
Start: 2020-10-13 | End: 2020-10-20

## 2020-10-13 NOTE — TELEPHONE ENCOUNTER
Called patient to let her know that her pharmacy, [facility] has her prescription and has had it since yesterday. Pharmacy said that it was put on hold because they need patients insurance card and ID. Patient verbalized understanding.

## 2020-10-26 ENCOUNTER — OFFICE VISIT (OUTPATIENT)
Dept: ORTHOPEDICS | Facility: CLINIC | Age: 40
End: 2020-10-26
Payer: COMMERCIAL

## 2020-10-26 ENCOUNTER — HOSPITAL ENCOUNTER (OUTPATIENT)
Dept: RADIOLOGY | Facility: HOSPITAL | Age: 40
Discharge: HOME | End: 2020-10-26
Attending: PHYSICIAN ASSISTANT
Payer: COMMERCIAL

## 2020-10-26 VITALS — BODY MASS INDEX: 21.97 KG/M2 | HEIGHT: 64 IN

## 2020-10-26 DIAGNOSIS — S82.831D CLOSED FRACTURE OF DISTAL END OF RIGHT FIBULA WITH ROUTINE HEALING, UNSPECIFIED FRACTURE MORPHOLOGY, SUBSEQUENT ENCOUNTER: Primary | ICD-10-CM

## 2020-10-26 DIAGNOSIS — S82.831D CLOSED FRACTURE OF DISTAL END OF RIGHT FIBULA WITH ROUTINE HEALING, UNSPECIFIED FRACTURE MORPHOLOGY, SUBSEQUENT ENCOUNTER: ICD-10-CM

## 2020-10-26 PROCEDURE — 99999 PR PBB SHADOW E&M-EST. PATIENT-LVL III: CPT | Mod: PBBFAC,,, | Performed by: PHYSICIAN ASSISTANT

## 2020-10-26 PROCEDURE — 99999 PR PBB SHADOW E&M-EST. PATIENT-LVL III: ICD-10-PCS | Mod: PBBFAC,,, | Performed by: PHYSICIAN ASSISTANT

## 2020-10-26 PROCEDURE — 73610 X-RAY EXAM OF ANKLE: CPT | Mod: TC,RT

## 2020-10-26 PROCEDURE — 73610 XR ANKLE COMPLETE 3 VIEW RIGHT: ICD-10-PCS | Mod: 26,RT,, | Performed by: RADIOLOGY

## 2020-10-26 PROCEDURE — 73610 X-RAY EXAM OF ANKLE: CPT | Mod: 26,RT,, | Performed by: RADIOLOGY

## 2020-10-26 PROCEDURE — 99213 PR OFFICE/OUTPT VISIT, EST, LEVL III, 20-29 MIN: ICD-10-PCS | Mod: S$GLB,,, | Performed by: PHYSICIAN ASSISTANT

## 2020-10-26 PROCEDURE — 99213 OFFICE O/P EST LOW 20 MIN: CPT | Mod: S$GLB,,, | Performed by: PHYSICIAN ASSISTANT

## 2020-10-26 RX ORDER — HYDROCODONE BITARTRATE AND ACETAMINOPHEN 10; 325 MG/1; MG/1
1 TABLET ORAL EVERY 6 HOURS PRN
Qty: 28 TABLET | Refills: 0 | Status: SHIPPED | OUTPATIENT
Start: 2020-10-26 | End: 2020-11-02

## 2020-10-26 NOTE — PROGRESS NOTES
Subjective:      Patient ID: Cynthia Dailey is a 40 y.o. female.    Chief Complaint: Pain and Follow-up of the Right Ankle    HPI  Patient returns for f/u for her right distal fibula fracture that occurred 6 weeks ago. She has been WBAT in boot for 2 weeks. She says the boot helps. She bikes in the mornings and denies pain with this. She has intermittent swelling at the lateral aspect. She takes Norco 10 mg 1-2 times daily. Her ankle has gotten better since last visit.   Review of Systems   Constitution: Negative for chills, fever and night sweats.   Cardiovascular: Negative for chest pain.   Respiratory: Negative for cough and shortness of breath.    Hematologic/Lymphatic: Does not bruise/bleed easily.   Skin: Negative for color change.   Gastrointestinal: Negative for heartburn.   Genitourinary: Negative for dysuria.   Neurological: Negative for numbness and paresthesias.   Psychiatric/Behavioral: Negative for altered mental status.   Allergic/Immunologic: Negative for persistent infections.         Objective:            General    Vitals reviewed.  Constitutional: She is oriented to person, place, and time. She appears well-developed and well-nourished.   Cardiovascular: Normal rate.    Neurological: She is alert and oriented to person, place, and time.         Right Ankle/Foot Exam     Inspection   Erythema: absent    Range of Motion   Ankle Joint   Dorsiflexion: normal   Plantar flexion: normal   Subtalar Joint   Inversion: abnormal   Eversion: abnormal     Other   Sensation: normal    Comments:  Mild TTP distal fibula. No swelling.         Vascular Exam     Right Pulses  Dorsalis Pedis:      2+            X-ray: ordered and reviewed by myself. There is a healing fracture of the distal fibula good alignment no complication.          Assessment:       Encounter Diagnosis   Name Primary?    Closed fracture of distal end of right fibula with routine healing, unspecified fracture morphology, subsequent encounter Yes           Plan:       Patient will continue WBAT in boot for 2 more weeks then wean out into good supportive shoes as pain allows. Norco 10 mg refilled. RTC in 3 weeks for repeat x-ray.

## 2020-11-15 ENCOUNTER — PATIENT OUTREACH (OUTPATIENT)
Dept: ADMINISTRATIVE | Facility: OTHER | Age: 40
End: 2020-11-15

## 2020-11-16 ENCOUNTER — OFFICE VISIT (OUTPATIENT)
Dept: ORTHOPEDICS | Facility: CLINIC | Age: 40
End: 2020-11-16
Payer: COMMERCIAL

## 2020-11-16 ENCOUNTER — HOSPITAL ENCOUNTER (OUTPATIENT)
Dept: RADIOLOGY | Facility: HOSPITAL | Age: 40
Discharge: HOME | End: 2020-11-16
Attending: PHYSICIAN ASSISTANT
Payer: COMMERCIAL

## 2020-11-16 VITALS — BODY MASS INDEX: 21.83 KG/M2 | WEIGHT: 127.88 LBS | HEIGHT: 64 IN

## 2020-11-16 DIAGNOSIS — M25.571 RIGHT ANKLE PAIN, UNSPECIFIED CHRONICITY: ICD-10-CM

## 2020-11-16 DIAGNOSIS — S82.831D CLOSED FRACTURE OF DISTAL END OF RIGHT FIBULA WITH ROUTINE HEALING, UNSPECIFIED FRACTURE MORPHOLOGY, SUBSEQUENT ENCOUNTER: Primary | ICD-10-CM

## 2020-11-16 PROCEDURE — 73610 X-RAY EXAM OF ANKLE: CPT | Mod: TC,RT

## 2020-11-16 PROCEDURE — 99213 OFFICE O/P EST LOW 20 MIN: CPT | Mod: S$GLB,,, | Performed by: PHYSICIAN ASSISTANT

## 2020-11-16 PROCEDURE — 73610 X-RAY EXAM OF ANKLE: CPT | Mod: 26,RT,, | Performed by: RADIOLOGY

## 2020-11-16 PROCEDURE — 73610 XR ANKLE COMPLETE 3 VIEW RIGHT: ICD-10-PCS | Mod: 26,RT,, | Performed by: RADIOLOGY

## 2020-11-16 PROCEDURE — 99999 PR PBB SHADOW E&M-EST. PATIENT-LVL IV: CPT | Mod: PBBFAC,,, | Performed by: PHYSICIAN ASSISTANT

## 2020-11-16 PROCEDURE — 99999 PR PBB SHADOW E&M-EST. PATIENT-LVL IV: ICD-10-PCS | Mod: PBBFAC,,, | Performed by: PHYSICIAN ASSISTANT

## 2020-11-16 PROCEDURE — 99213 PR OFFICE/OUTPT VISIT, EST, LEVL III, 20-29 MIN: ICD-10-PCS | Mod: S$GLB,,, | Performed by: PHYSICIAN ASSISTANT

## 2020-11-16 RX ORDER — NAPROXEN 500 MG/1
500 TABLET ORAL 2 TIMES DAILY WITH MEALS
Qty: 28 TABLET | Refills: 0 | Status: SHIPPED | OUTPATIENT
Start: 2020-11-16 | End: 2020-11-30 | Stop reason: SDUPTHER

## 2020-11-16 NOTE — PROGRESS NOTES
Subjective:      Patient ID: Cynthia Dailey is a 40 y.o. female.    Chief Complaint: Follow-up of the Right Ankle    HPI  Patient returns for f/u for her right distal fibula fracture that occurred 9 weeks ago. She d/c the boot about a week ago. She is wearing good supportive shoes. When she moves her big toe, she reports some pain at the lateral ankle. She still has some mild intermittent swelling at the lateral aspect. She takes Norco 10 mg once a day. She can still bike. She has pain and stiffness with stairs and long walks.   Review of Systems   Constitution: Negative for chills, fever and night sweats.   Cardiovascular: Negative for chest pain.   Respiratory: Negative for cough and shortness of breath.    Hematologic/Lymphatic: Does not bruise/bleed easily.   Skin: Negative for color change.   Gastrointestinal: Negative for heartburn.   Genitourinary: Negative for dysuria.   Neurological: Negative for numbness and paresthesias.   Psychiatric/Behavioral: Negative for altered mental status.   Allergic/Immunologic: Negative for persistent infections.         Objective:            General    Vitals reviewed.  Constitutional: She is oriented to person, place, and time. She appears well-developed and well-nourished.   Cardiovascular: Normal rate.    Neurological: She is alert and oriented to person, place, and time.         Right Ankle/Foot Exam     Inspection   Erythema: absent    Range of Motion   The patient has normal right ankle ROM.    Other   Sensation: normal    Comments:  Mild TTP distal fibula.         Vascular Exam     Right Pulses  Dorsalis Pedis:      2+              X-ray: ordered and reviewed by myself. Healing fracture distal fibular shaft.         Assessment:       Encounter Diagnosis   Name Primary?    Closed fracture of distal end of right fibula with routine healing, unspecified fracture morphology, subsequent encounter Yes          Plan:       Patient will continue good supportive shoes. She  declined an ankle brace. Order placed for PT and sent to [facility] on [address]. She will take naproxen BID x 2 weeks. RTC in 3 weeks for repeat x-ray.

## 2020-11-16 NOTE — PROGRESS NOTES
Requested updates within Care Everywhere.  Patient's chart was reviewed for overdue YAAKOV topics.  Immunizations reconciled.

## 2020-11-30 RX ORDER — NAPROXEN 500 MG/1
500 TABLET ORAL 2 TIMES DAILY WITH MEALS
Qty: 60 TABLET | Refills: 0 | Status: SHIPPED | OUTPATIENT
Start: 2020-11-30 | End: 2020-12-30

## 2020-12-07 ENCOUNTER — OFFICE VISIT (OUTPATIENT)
Dept: ORTHOPEDICS | Facility: CLINIC | Age: 40
End: 2020-12-07
Payer: COMMERCIAL

## 2020-12-07 ENCOUNTER — HOSPITAL ENCOUNTER (OUTPATIENT)
Dept: RADIOLOGY | Facility: HOSPITAL | Age: 40
Discharge: HOME | End: 2020-12-07
Attending: PHYSICIAN ASSISTANT
Payer: COMMERCIAL

## 2020-12-07 VITALS — BODY MASS INDEX: 22.67 KG/M2 | WEIGHT: 132.81 LBS | HEIGHT: 64 IN

## 2020-12-07 DIAGNOSIS — S82.831D CLOSED FRACTURE OF DISTAL END OF RIGHT FIBULA WITH ROUTINE HEALING, UNSPECIFIED FRACTURE MORPHOLOGY, SUBSEQUENT ENCOUNTER: Primary | ICD-10-CM

## 2020-12-07 DIAGNOSIS — S82.831D CLOSED FRACTURE OF DISTAL END OF RIGHT FIBULA WITH ROUTINE HEALING, UNSPECIFIED FRACTURE MORPHOLOGY, SUBSEQUENT ENCOUNTER: ICD-10-CM

## 2020-12-07 PROCEDURE — 73610 X-RAY EXAM OF ANKLE: CPT | Mod: TC,RT

## 2020-12-07 PROCEDURE — 99213 PR OFFICE/OUTPT VISIT, EST, LEVL III, 20-29 MIN: ICD-10-PCS | Mod: S$GLB,,, | Performed by: PHYSICIAN ASSISTANT

## 2020-12-07 PROCEDURE — 73610 XR ANKLE COMPLETE 3 VIEW RIGHT: ICD-10-PCS | Mod: 26,RT,, | Performed by: RADIOLOGY

## 2020-12-07 PROCEDURE — 99999 PR PBB SHADOW E&M-EST. PATIENT-LVL IV: ICD-10-PCS | Mod: PBBFAC,,, | Performed by: PHYSICIAN ASSISTANT

## 2020-12-07 PROCEDURE — 73610 X-RAY EXAM OF ANKLE: CPT | Mod: 26,RT,, | Performed by: RADIOLOGY

## 2020-12-07 PROCEDURE — 99213 OFFICE O/P EST LOW 20 MIN: CPT | Mod: S$GLB,,, | Performed by: PHYSICIAN ASSISTANT

## 2020-12-07 PROCEDURE — 99999 PR PBB SHADOW E&M-EST. PATIENT-LVL IV: CPT | Mod: PBBFAC,,, | Performed by: PHYSICIAN ASSISTANT

## 2020-12-07 RX ORDER — DIAZEPAM 5 MG/1
TABLET ORAL
COMMUNITY
Start: 2020-11-13 | End: 2023-01-05

## 2020-12-07 NOTE — PROGRESS NOTES
Subjective:      Patient ID: Cynthia Dailey is a 40 y.o. female.    Chief Complaint: Pain of the Right Ankle    HPI  Patient returns for f/u for her right distal fibula fracture that occurred 12 weeks ago. She says [facility] never called her. Last week she started walking longer distances and jogged some. She had some stiffness pain with this. She takes naproxen BID which helps. She does not take the Norco. Her swelling has gotten better. She still has some pain with stairs.   Review of Systems   Constitution: Negative for chills, fever and night sweats.   Cardiovascular: Negative for chest pain.   Respiratory: Negative for cough and shortness of breath.    Hematologic/Lymphatic: Does not bruise/bleed easily.   Skin: Negative for color change.   Gastrointestinal: Negative for heartburn.   Genitourinary: Negative for dysuria.   Neurological: Negative for numbness and paresthesias.   Psychiatric/Behavioral: Negative for altered mental status.   Allergic/Immunologic: Negative for persistent infections.         Objective:            General    Vitals reviewed.  Constitutional: She is oriented to person, place, and time. She appears well-developed and well-nourished.   Cardiovascular: Normal rate.    Neurological: She is alert and oriented to person, place, and time.         Right Ankle/Foot Exam     Inspection   Erythema: absent    Range of Motion   The patient has normal right ankle ROM.    Other   Sensation: normal    Comments:  Minimal TTP distal fibula.         Vascular Exam     Right Pulses  Dorsalis Pedis:      2+              X-ray: ordered and reviewed by myself. There is a healing fracture of the distal fibula good alignment no complication.        Assessment:       Encounter Diagnosis   Name Primary?    Closed fracture of distal end of right fibula with routine healing, unspecified fracture morphology, subsequent encounter Yes          Plan:       Patient can try an otc ankle brace as needed with exercise.  Will re-send PT order to [facility]. She was given their phone number in case she doesn't hear from them again. She will progress her activity as her pain allows. Can continue naproxen as needed. She wishes to f/u as needed.

## 2021-02-04 DIAGNOSIS — S03.00XS DISLOCATION OF TEMPOROMANDIBULAR JOINT, SEQUELA: ICD-10-CM

## 2021-02-04 DIAGNOSIS — Z00.00 WELL ADULT EXAM: Primary | ICD-10-CM

## 2021-02-04 DIAGNOSIS — M62.838 SPASM OF MUSCLE: ICD-10-CM

## 2021-02-04 RX ORDER — CYCLOBENZAPRINE HCL 10 MG
TABLET ORAL
Qty: 30 TABLET | Refills: 1 | Status: SHIPPED | OUTPATIENT
Start: 2021-02-04

## 2021-04-05 ENCOUNTER — PATIENT MESSAGE (OUTPATIENT)
Dept: ADMINISTRATIVE | Facility: HOSPITAL | Age: 41
End: 2021-04-05

## 2021-04-16 ENCOUNTER — PATIENT MESSAGE (OUTPATIENT)
Dept: RESEARCH | Facility: HOSPITAL | Age: 41
End: 2021-04-16

## 2021-05-20 NOTE — TELEPHONE ENCOUNTER
05/20/21      Vesna Guerra  [address] [facility] [address]    Dear Vesna,    We look forward to seeing you on 9/13/2021 for your procedure at 8:30 am with an ARRIVAL time of 7:30 am.    To better prepare you, please observe the following:     Preparing for GI Procedure  ? Please schedule an appointment within 30 days of surgery with your primary care doctor for a history and physical. They will perform any required labs or tests before surgery. If your doctor is not an [facility] physician, please ask them to fax the H&P and test results to [phone].      You will receive a call from our Pre-Admission Testing department prior to your surgical procedure. This call is necessary to get important information about your health history, to ensure you are properly prepared for surgery, and minimize the chance of having your procedure delayed or rescheduled.    WHAT TO DO ABOUT YOUR PRESCRIPTION MEDICATIONS  You must continue taking all your previously prescribed medications as directed unless specifically told not to by your doctor, or if you find them on the list below    STOP THESE MEDICATIONS   • Aspirin stop 3 days prior unless instructed otherwise by your doctor or Cardiologist.   • Erectile dysfunction medications stop 2 days prior    • Herbal medications/ Vitamins/ Fish Oil stop 7 days prior unless otherwise directed by your doctor.    • Phentermine stop 7 days prior   • Selegiline patches stop 21 days prior     Your doctor will have given you instructions about modifying your Insulin regimen 1 day prior.  If on a blood thinner, ask your doctor, Cardiologist and/or surgeon if and when it should be stopped.    You will be contacted by phone or email by a company called [employer]. This is an online educational platform that will provide education regarding your procedure. Please take the time to review this video.  Day before Procedure  Please follow any specific instructions given to you regarding  Spoke with patient and results were given.    Patient verbalizes understanding.    Patient did have a question and wanted to know if this was caused by the surgery? She stated she never had this before.    Message sent to Dr. Cortes   any prep needed related to your procedure.    Day of Procedure  Take your normal morning medications with a sip of water first thing in the morning prior to surgery, except those medications your doctor or our Pre-Admission Testing office has specifically told you not to take.    Other than a sip of water to wash down medications, DO NOT PUT ANYTHING IN YOUR MOUTH FOR AT LEAST FOUR HOURS PRIOR TO YOUR PROCEDURE.  This includes gum, candy, cigarettes, and chewing tobacco.       If you choose, for your convenience we have an outpatient pharmacy on-site and can arrange to have your discharge prescription filled before you leave, to save you a trip. You may want to bring enough money to cover the cost of this prescription.      Each patient that comes through the GI department is given individualized care and attention. The doctors and nurses spend the time necessary with each patient to ensure the best care. Sometimes, this can cause delays. You may want to bring a book, puzzle or music player to keep you busy if you experience a delay.       Procedures usually involve giving medications that put you to sleep or ease anxiety. For this reason, you must have a ride home from the hospital and someone (over age 16) should stay with you for the first 24 hours after your procedure. The procedure will be canceled if you do not have a responsible person with you. You are not allowed to drive after receiving sedation of any type.     Visitor Restrictions are currently in place due to COVID-19.  You can bring one designated adult (18 yr or older) with you to your surgery. Additional COVID related restrictions may apply.     Bring a photo ID, your insurance card and any copay due at time of service. Leave all valuables at home. Do not wear jewelry, makeup, body lotion, or fingernail polish.        If you have any questions or concerns before the day of your procedure, please call [phone] to have them answered.     After your  procedure you may get a survey via email or text message. Please take the opportunity to fill it out letting us know what we did well or what we could improve on. Our goal is to provide our patients with the best possible care and we couldn’t do it without your input.     We look forward to seeing you,  Your GI Services Team

## 2021-07-06 ENCOUNTER — PATIENT MESSAGE (OUTPATIENT)
Dept: ADMINISTRATIVE | Facility: HOSPITAL | Age: 41
End: 2021-07-06

## 2021-07-21 DIAGNOSIS — Z12.31 OTHER SCREENING MAMMOGRAM: ICD-10-CM

## 2021-08-24 ENCOUNTER — OFFICE VISIT (OUTPATIENT)
Dept: INTERNAL MEDICINE | Facility: CLINIC | Age: 41
End: 2021-08-24
Payer: COMMERCIAL

## 2021-08-24 ENCOUNTER — HOSPITAL ENCOUNTER (OUTPATIENT)
Dept: RADIOLOGY | Facility: HOSPITAL | Age: 41
Discharge: HOME | End: 2021-08-24
Attending: FAMILY MEDICINE
Payer: COMMERCIAL

## 2021-08-24 VITALS
OXYGEN SATURATION: 99 % | SYSTOLIC BLOOD PRESSURE: 118 MMHG | TEMPERATURE: 97 F | BODY MASS INDEX: 20.82 KG/M2 | DIASTOLIC BLOOD PRESSURE: 72 MMHG | HEIGHT: 64 IN | WEIGHT: 121.94 LBS | RESPIRATION RATE: 18 BRPM | HEART RATE: 74 BPM

## 2021-08-24 DIAGNOSIS — J45.20 MILD INTERMITTENT CHRONIC ASTHMA WITHOUT COMPLICATION: ICD-10-CM

## 2021-08-24 DIAGNOSIS — M79.671 PAIN OF RIGHT HEEL: ICD-10-CM

## 2021-08-24 DIAGNOSIS — Z00.00 WELL ADULT EXAM: Primary | ICD-10-CM

## 2021-08-24 DIAGNOSIS — F41.9 ANXIETY: ICD-10-CM

## 2021-08-24 DIAGNOSIS — F98.8 ATTENTION DEFICIT DISORDER, UNSPECIFIED HYPERACTIVITY PRESENCE: ICD-10-CM

## 2021-08-24 PROCEDURE — 99396 PR PREVENTIVE VISIT,EST,40-64: ICD-10-PCS | Mod: S$GLB,,, | Performed by: FAMILY MEDICINE

## 2021-08-24 PROCEDURE — 73650 X-RAY EXAM OF HEEL: CPT | Mod: 26,RT,, | Performed by: RADIOLOGY

## 2021-08-24 PROCEDURE — 99396 PREV VISIT EST AGE 40-64: CPT | Mod: S$GLB,,, | Performed by: FAMILY MEDICINE

## 2021-08-24 PROCEDURE — 73650 X-RAY EXAM OF HEEL: CPT | Mod: TC,PO,RT

## 2021-08-24 PROCEDURE — 99999 PR PBB SHADOW E&M-EST. PATIENT-LVL IV: ICD-10-PCS | Mod: PBBFAC,,, | Performed by: FAMILY MEDICINE

## 2021-08-24 PROCEDURE — 99999 PR PBB SHADOW E&M-EST. PATIENT-LVL IV: CPT | Mod: PBBFAC,,, | Performed by: FAMILY MEDICINE

## 2021-08-24 PROCEDURE — 73650 XR CALCANEUS 2 VIEW RIGHT: ICD-10-PCS | Mod: 26,RT,, | Performed by: RADIOLOGY

## 2021-08-24 RX ORDER — FLUTICASONE FUROATE AND VILANTEROL 100; 25 UG/1; UG/1
1 POWDER RESPIRATORY (INHALATION) DAILY
Qty: 60 EACH | Refills: 11 | Status: SHIPPED | OUTPATIENT
Start: 2021-08-24 | End: 2021-09-15

## 2021-09-15 ENCOUNTER — PATIENT MESSAGE (OUTPATIENT)
Dept: INTERNAL MEDICINE | Facility: CLINIC | Age: 41
End: 2021-09-15

## 2021-09-15 DIAGNOSIS — J45.20 MILD INTERMITTENT CHRONIC ASTHMA WITHOUT COMPLICATION: Primary | ICD-10-CM

## 2021-09-27 DIAGNOSIS — J45.20 MILD INTERMITTENT CHRONIC ASTHMA WITHOUT COMPLICATION: ICD-10-CM

## 2021-09-27 RX ORDER — MODAFINIL 100 MG/1
TABLET ORAL
COMMUNITY
Start: 2021-06-03 | End: 2024-02-20

## 2021-10-04 ENCOUNTER — PATIENT MESSAGE (OUTPATIENT)
Dept: ADMINISTRATIVE | Facility: HOSPITAL | Age: 41
End: 2021-10-04

## 2021-10-21 RX ORDER — POLYETHYLENE GLYCOL 3350 17 G/17G
POWDER, FOR SOLUTION ORAL
Qty: 1428 G | Refills: 5 | Status: SHIPPED | OUTPATIENT
Start: 2021-10-21 | End: 2022-11-03 | Stop reason: SDUPTHER

## 2022-01-25 ENCOUNTER — PATIENT MESSAGE (OUTPATIENT)
Dept: ADMINISTRATIVE | Facility: HOSPITAL | Age: 42
End: 2022-01-25
Payer: COMMERCIAL

## 2022-03-16 ENCOUNTER — PATIENT MESSAGE (OUTPATIENT)
Dept: INTERNAL MEDICINE | Facility: CLINIC | Age: 42
End: 2022-03-16
Payer: COMMERCIAL

## 2022-03-16 DIAGNOSIS — S39.011A STRAIN OF MUSCLE OF RIGHT GROIN REGION: Primary | ICD-10-CM

## 2022-03-16 NOTE — TELEPHONE ENCOUNTER
I have referred the patient to sports medicine for further evaluation.  Please schedule.  Thank you.

## 2022-03-16 NOTE — TELEPHONE ENCOUNTER
Pt states on February 26 she  experienced an odd feeling in her right upper inner thigh (groin area) after a fairly long run.    Pt  thought She pulled a muscle; after running the next day pt could barely walk.    Pt states she is  still experiencing pain and weakness in that area along with her  glute and hip flexor.She is also having a hard time standing on just her right leg when putting on pants or socks.   Pt states she  usually runs 50 miles a week but hasn't ran much since the injury because the pain is so intense after she runs.    Pt would like to know if she  Should  go to a sports injury doctor or a PT?      LOV:  08/24/21

## 2022-03-18 DIAGNOSIS — R10.31 RIGHT GROIN PAIN: Primary | ICD-10-CM

## 2022-03-21 ENCOUNTER — HOSPITAL ENCOUNTER (OUTPATIENT)
Dept: RADIOLOGY | Facility: HOSPITAL | Age: 42
Discharge: HOME | End: 2022-03-21
Attending: PHYSICIAN ASSISTANT
Payer: COMMERCIAL

## 2022-03-21 ENCOUNTER — OFFICE VISIT (OUTPATIENT)
Dept: SPORTS MEDICINE | Facility: CLINIC | Age: 42
End: 2022-03-21
Payer: COMMERCIAL

## 2022-03-21 VITALS
WEIGHT: 124.31 LBS | DIASTOLIC BLOOD PRESSURE: 71 MMHG | HEIGHT: 64 IN | BODY MASS INDEX: 21.22 KG/M2 | HEART RATE: 68 BPM | SYSTOLIC BLOOD PRESSURE: 108 MMHG

## 2022-03-21 DIAGNOSIS — M62.81 ABNORMAL GAIT DUE TO MUSCLE WEAKNESS: ICD-10-CM

## 2022-03-21 DIAGNOSIS — R26.9 ABNORMAL GAIT DUE TO MUSCLE WEAKNESS: ICD-10-CM

## 2022-03-21 DIAGNOSIS — S39.011A STRAIN OF MUSCLE OF RIGHT GROIN REGION: Primary | ICD-10-CM

## 2022-03-21 DIAGNOSIS — R10.31 RIGHT GROIN PAIN: ICD-10-CM

## 2022-03-21 DIAGNOSIS — M25.559 PAIN IN UNSPECIFIED HIP: ICD-10-CM

## 2022-03-21 DIAGNOSIS — R93.5 ABNORMAL CT OF THE ABDOMEN: ICD-10-CM

## 2022-03-21 PROCEDURE — 73502 X-RAY EXAM HIP UNI 2-3 VIEWS: CPT | Mod: 26,RT,, | Performed by: RADIOLOGY

## 2022-03-21 PROCEDURE — 3078F PR MOST RECENT DIASTOLIC BLOOD PRESSURE < 80 MM HG: ICD-10-PCS | Mod: CPTII,S$GLB,, | Performed by: PHYSICIAN ASSISTANT

## 2022-03-21 PROCEDURE — 99204 PR OFFICE/OUTPT VISIT, NEW, LEVL IV, 45-59 MIN: ICD-10-PCS | Mod: S$GLB,,, | Performed by: PHYSICIAN ASSISTANT

## 2022-03-21 PROCEDURE — 99204 OFFICE O/P NEW MOD 45 MIN: CPT | Mod: S$GLB,,, | Performed by: PHYSICIAN ASSISTANT

## 2022-03-21 PROCEDURE — 99999 PR PBB SHADOW E&M-EST. PATIENT-LVL IV: CPT | Mod: PBBFAC,,, | Performed by: PHYSICIAN ASSISTANT

## 2022-03-21 PROCEDURE — 73502 XR HIP WITH PELVIS WHEN PERFORMED, 2 OR 3  VIEWS RIGHT: ICD-10-PCS | Mod: 26,RT,, | Performed by: RADIOLOGY

## 2022-03-21 PROCEDURE — 3074F SYST BP LT 130 MM HG: CPT | Mod: CPTII,S$GLB,, | Performed by: PHYSICIAN ASSISTANT

## 2022-03-21 PROCEDURE — 3078F DIAST BP <80 MM HG: CPT | Mod: CPTII,S$GLB,, | Performed by: PHYSICIAN ASSISTANT

## 2022-03-21 PROCEDURE — 1159F MED LIST DOCD IN RCRD: CPT | Mod: CPTII,S$GLB,, | Performed by: PHYSICIAN ASSISTANT

## 2022-03-21 PROCEDURE — 99999 PR PBB SHADOW E&M-EST. PATIENT-LVL IV: ICD-10-PCS | Mod: PBBFAC,,, | Performed by: PHYSICIAN ASSISTANT

## 2022-03-21 PROCEDURE — 3008F PR BODY MASS INDEX (BMI) DOCUMENTED: ICD-10-PCS | Mod: CPTII,S$GLB,, | Performed by: PHYSICIAN ASSISTANT

## 2022-03-21 PROCEDURE — 1159F PR MEDICATION LIST DOCUMENTED IN MEDICAL RECORD: ICD-10-PCS | Mod: CPTII,S$GLB,, | Performed by: PHYSICIAN ASSISTANT

## 2022-03-21 PROCEDURE — 73502 X-RAY EXAM HIP UNI 2-3 VIEWS: CPT | Mod: TC,PN,RT

## 2022-03-21 PROCEDURE — 3008F BODY MASS INDEX DOCD: CPT | Mod: CPTII,S$GLB,, | Performed by: PHYSICIAN ASSISTANT

## 2022-03-21 PROCEDURE — 3074F PR MOST RECENT SYSTOLIC BLOOD PRESSURE < 130 MM HG: ICD-10-PCS | Mod: CPTII,S$GLB,, | Performed by: PHYSICIAN ASSISTANT

## 2022-03-21 RX ORDER — MELOXICAM 15 MG/1
15 TABLET ORAL DAILY
Qty: 30 TABLET | Refills: 2 | Status: SHIPPED | OUTPATIENT
Start: 2022-03-21 | End: 2023-01-05

## 2022-03-21 RX ORDER — METHOCARBAMOL 500 MG/1
500 TABLET, FILM COATED ORAL 3 TIMES DAILY PRN
Qty: 40 TABLET | Refills: 0 | Status: SHIPPED | OUTPATIENT
Start: 2022-03-21 | End: 2022-04-06 | Stop reason: SDUPTHER

## 2022-03-21 NOTE — PROGRESS NOTES
Subjective:          Chief Complaint: Cynthia Dailey is a 41 y.o. female who had concerns including Pain of the Right Hip (Right groin).    Cynthia Dailey is a  and a runner (6 days a week, 50-55 miles a week). Rides bike or swim other day of the week. The pain started 3 weeks ago while running, around the end of her 8-mile run. Pain is becoming progressively worse, compensating gait, now pain with walking. Pain is located over (points to) anterior hip, groin. She reports that the pain is a 4 /10 sharp and aching pain today and not responding adequately to conservative measures which have included activity modifications, rest, and oral medication (iburporfen, tylenol). Is affecting ADLs and limiting desired level of activity. Denies numbness, tingling, radiation.  Pain is 9 /10 at its worst    Mechanical symptoms: none  Subjective instability: (+)   Worse with walking, being (hip flexion)  Better with rest.   Nocturnal symptoms: (+)    No previous surgeries or trauma on hips     Previous saw an Orthopedist in [place], 5 years ago for AVN after abnormal CT results. Treated with observation because she was not running then,        Review of Systems   Constitutional: Negative for chills and fever.   HENT: Negative for congestion and sore throat.    Eyes: Negative for discharge and double vision.   Cardiovascular: Negative for chest pain, palpitations and syncope.   Respiratory: Negative for cough and shortness of breath.    Endocrine: Negative for cold intolerance and heat intolerance.   Skin: Negative for dry skin and rash.   Musculoskeletal: Positive for joint pain and muscle weakness. Negative for falls.   Gastrointestinal: Negative for abdominal pain, nausea and vomiting.   Neurological: Negative for focal weakness, numbness and paresthesias.                   Objective:        General: Cynthia is well-developed, well-nourished, appears stated age, in no acute distress, alert and oriented to time, place  and person.     General    Constitutional: She is oriented to person, place, and time. She appears well-developed and well-nourished. No distress.   HENT:   Head: Normocephalic and atraumatic.   Nose: Nose normal.   Eyes: Conjunctivae and EOM are normal. Pupils are equal, round, and reactive to light.   Neck: No JVD present.   Cardiovascular: Normal rate, regular rhythm and normal heart sounds.    Pulmonary/Chest: Effort normal and breath sounds normal. No respiratory distress.   Abdominal: Soft. Bowel sounds are normal. She exhibits no distension. There is no abdominal tenderness.   Neurological: She is alert and oriented to person, place, and time. She has normal reflexes. Coordination normal.   Psychiatric: She has a normal mood and affect. Her behavior is normal. Judgment and thought content normal.     General Musculoskeletal Exam   Gait: antalgic, abnormal and Trendelenburg   Pelvic Obliquity: moderate      Right Knee Exam     Inspection   Effusion: absent    Left Knee Exam     Inspection   Effusion: absent    Right Hip Exam     Inspection   Swelling: absent  Bruising: absent  No deformity of hip.  Erythema: absent    Tenderness   The patient tender to palpation of the adductor insertion.    Range of Motion   Abduction: 45   Adduction:  30 (+pain) abnormal   Extension: 10   Flexion: 120   External rotation: 60   Internal rotation: 30     Tests   Pain w/ forced internal rotation (ILA): absent  Pain w/ forced external rotation (FADIR): absent  Lizzie: negative  Circumduction test: positive (groin)  Log Roll: negative  Step-down test: negative  Resisted sit-up pain with adductor contraction pain: positive    Other   Sensation: normal    Comments:  + pain weakness with hip extension  Left Hip Exam     Inspection   Swelling: absent  No deformity of hip.  Erythema: absent  Bruising: absent    Range of Motion   Abduction: 45   Adduction: 30   Extension: 10   Flexion: 120   External rotation: 60   Internal rotation:  30     Tests   Pain w/ forced internal rotation (ILA): absent  Pain w/ forced external rotation (FADIR): absent  Stinchfield test: negative  Log Roll: negative  Step-down test: negative    Other   Sensation: normal          Muscle Strength   Right Lower Extremity   Hip Abduction: 5/5   Hip Adduction: 4/5 (+pain)   Hip Flexion: 5/5   Ankle Dorsiflexion:  5/5   Left Lower Extremity   Hip Abduction: 5/5   Hip Adduction: 5/5   Hip Flexion: 5/5   Ankle Dorsiflexion:  5/5     Radiographic Findings 03/21/2022:    Abnormal subchondral lucency/sclerosis is observed involving both femoral heads, consistent with bilateral avascular necrosis.  This appearance is similar to the prior CT examination of 06/20/2017, and both femoral heads retain normal contour, with no significant flattening seen on either side.  Hip joint spaces are normally maintained on both sides as well, without significant narrowing.  Remaining osseous structures are unremarkable, with no evidence of recent or healing fracture or lytic destructive process observed.  SI joints appear unremarkable.     Impression:     Abnormal examination, with conventional radiographic findings strongly suggesting bilateral avascular necrosis of the femoral heads.  However, the appearance of the femoral heads is quite similar to the CT examination of 06/20/2017, with no interval femoral head flattening or narrowing of either hip joint space observed.    Xrays of the bilateral hips and pelvis were ordered and reviewed by me today. These findings were discussed and reviewed with the patient.    Reviewed previous CT abdomen        Assessment:       Encounter Diagnoses   Name Primary?    Strain of muscle of right groin region Yes    Abnormal CT of the abdomen     Abnormal gait due to muscle weakness           Plan:       We have discussed a variety of treatment options including medications, injections, physical therapy and other alternative treatments.  Patient's symptoms  are consistent with abductor muscle injury, possible glute weakness.  Due to abnormal findings of AVN previous imaging, would like to obtain MRI to assess muscle weakness and advancement of AVN.  Pt would like to proceed with MRI at this time.    I made the decision to obtain old records of the patient including previous notes and imaging. I independently reviewed and interpreted lab results today as well as prior imaging.     1. MRI right hip to assess adductor muscle pathology and AVN.  2. Ice compress to the affected area 2-3x a day for 15-20 minutes as needed for pain management.  3. Discontinue high impact activity/exercise until MRI results.  4. Mobic 15 mg 1 time daily PRN for pain management. Patient understands to take with food and/or OTC prilosec to decrease GI side effects.  5. Robaxin 500mg TID PRN  6. RTC to see Juanjo Brown PA-C for follow-up and MRI results.    All of the patient's questions were answered and the patient will contact us if they have any questions or concerns in the interim.                        Patient questionnaires may have been collected.

## 2022-03-27 ENCOUNTER — HOSPITAL ENCOUNTER (OUTPATIENT)
Dept: RADIOLOGY | Facility: HOSPITAL | Age: 42
Discharge: HOME | End: 2022-03-27
Attending: PHYSICIAN ASSISTANT
Payer: COMMERCIAL

## 2022-03-27 DIAGNOSIS — M62.81 ABNORMAL GAIT DUE TO MUSCLE WEAKNESS: ICD-10-CM

## 2022-03-27 DIAGNOSIS — S39.011A STRAIN OF MUSCLE OF RIGHT GROIN REGION: ICD-10-CM

## 2022-03-27 DIAGNOSIS — M25.559 PAIN IN UNSPECIFIED HIP: ICD-10-CM

## 2022-03-27 DIAGNOSIS — R26.9 ABNORMAL GAIT DUE TO MUSCLE WEAKNESS: ICD-10-CM

## 2022-03-27 DIAGNOSIS — R93.5 ABNORMAL CT OF THE ABDOMEN: ICD-10-CM

## 2022-03-27 PROCEDURE — 72195 MRI PELVIS WITHOUT CONTRAST: ICD-10-PCS | Mod: 26,,, | Performed by: RADIOLOGY

## 2022-03-27 PROCEDURE — 72195 MRI PELVIS W/O DYE: CPT | Mod: 26,,, | Performed by: RADIOLOGY

## 2022-03-27 PROCEDURE — 72195 MRI PELVIS W/O DYE: CPT | Mod: TC

## 2022-03-28 ENCOUNTER — OFFICE VISIT (OUTPATIENT)
Dept: SPORTS MEDICINE | Facility: CLINIC | Age: 42
End: 2022-03-28
Payer: COMMERCIAL

## 2022-03-28 VITALS
SYSTOLIC BLOOD PRESSURE: 116 MMHG | DIASTOLIC BLOOD PRESSURE: 72 MMHG | WEIGHT: 121.06 LBS | BODY MASS INDEX: 20.67 KG/M2 | HEIGHT: 64 IN | HEART RATE: 83 BPM

## 2022-03-28 DIAGNOSIS — R93.5 ABNORMAL MRI, PELVIS: ICD-10-CM

## 2022-03-28 DIAGNOSIS — S32.591D CLOSED FRACTURE OF RAMUS OF RIGHT PUBIS WITH ROUTINE HEALING, SUBSEQUENT ENCOUNTER: Primary | ICD-10-CM

## 2022-03-28 PROCEDURE — 1160F RVW MEDS BY RX/DR IN RCRD: CPT | Mod: CPTII,S$GLB,, | Performed by: PHYSICIAN ASSISTANT

## 2022-03-28 PROCEDURE — 99213 PR OFFICE/OUTPT VISIT, EST, LEVL III, 20-29 MIN: ICD-10-PCS | Mod: S$GLB,,, | Performed by: PHYSICIAN ASSISTANT

## 2022-03-28 PROCEDURE — 99999 PR PBB SHADOW E&M-EST. PATIENT-LVL III: CPT | Mod: PBBFAC,,, | Performed by: PHYSICIAN ASSISTANT

## 2022-03-28 PROCEDURE — 3078F PR MOST RECENT DIASTOLIC BLOOD PRESSURE < 80 MM HG: ICD-10-PCS | Mod: CPTII,S$GLB,, | Performed by: PHYSICIAN ASSISTANT

## 2022-03-28 PROCEDURE — 99999 PR PBB SHADOW E&M-EST. PATIENT-LVL III: ICD-10-PCS | Mod: PBBFAC,,, | Performed by: PHYSICIAN ASSISTANT

## 2022-03-28 PROCEDURE — 99213 OFFICE O/P EST LOW 20 MIN: CPT | Mod: S$GLB,,, | Performed by: PHYSICIAN ASSISTANT

## 2022-03-28 PROCEDURE — 1160F PR REVIEW ALL MEDS BY PRESCRIBER/CLIN PHARMACIST DOCUMENTED: ICD-10-PCS | Mod: CPTII,S$GLB,, | Performed by: PHYSICIAN ASSISTANT

## 2022-03-28 PROCEDURE — 3008F BODY MASS INDEX DOCD: CPT | Mod: CPTII,S$GLB,, | Performed by: PHYSICIAN ASSISTANT

## 2022-03-28 PROCEDURE — 3008F PR BODY MASS INDEX (BMI) DOCUMENTED: ICD-10-PCS | Mod: CPTII,S$GLB,, | Performed by: PHYSICIAN ASSISTANT

## 2022-03-28 PROCEDURE — 1159F PR MEDICATION LIST DOCUMENTED IN MEDICAL RECORD: ICD-10-PCS | Mod: CPTII,S$GLB,, | Performed by: PHYSICIAN ASSISTANT

## 2022-03-28 PROCEDURE — 3078F DIAST BP <80 MM HG: CPT | Mod: CPTII,S$GLB,, | Performed by: PHYSICIAN ASSISTANT

## 2022-03-28 PROCEDURE — 3074F PR MOST RECENT SYSTOLIC BLOOD PRESSURE < 130 MM HG: ICD-10-PCS | Mod: CPTII,S$GLB,, | Performed by: PHYSICIAN ASSISTANT

## 2022-03-28 PROCEDURE — 1159F MED LIST DOCD IN RCRD: CPT | Mod: CPTII,S$GLB,, | Performed by: PHYSICIAN ASSISTANT

## 2022-03-28 PROCEDURE — 3074F SYST BP LT 130 MM HG: CPT | Mod: CPTII,S$GLB,, | Performed by: PHYSICIAN ASSISTANT

## 2022-03-28 NOTE — PROGRESS NOTES
Subjective:          Chief Complaint: Cynthia Dailey is a 41 y.o. female who had concerns including Pain of the Right Hip.    Interval hx: Pt presents for MRI results and follow-up. She reports symptoms have not improved.    Previous HPI: Cynthia Dailey is a  and a runner (6 days a week, 50-55 miles a week). Rides bike or swim other day of the week. The pain started 3 weeks ago while running, around the end of her 8-mile run. Pain is becoming progressively worse, compensating gait, now pain with walking. Pain is located over (points to) anterior hip, groin. She reports that the pain is a 4 /10 sharp and aching pain today and not responding adequately to conservative measures which have included activity modifications, rest, and oral medication (iburporfen, tylenol). Is affecting ADLs and limiting desired level of activity. Denies numbness, tingling, radiation.  Pain is 9 /10 at its worst    Mechanical symptoms: none  Subjective instability: (+)   Worse with walking, being (hip flexion)  Better with rest.   Nocturnal symptoms: (+)    No previous surgeries or trauma on hips     Previous saw an Orthopedist in [place], 5 years ago for AVN after abnormal CT results. Treated with observation because she was not running then,        Review of Systems   Constitutional: Negative for chills and fever.   HENT: Negative for congestion and sore throat.    Eyes: Negative for discharge and double vision.   Cardiovascular: Negative for chest pain, palpitations and syncope.   Respiratory: Negative for cough and shortness of breath.    Endocrine: Negative for cold intolerance and heat intolerance.   Skin: Negative for dry skin and rash.   Musculoskeletal: Positive for joint pain and muscle weakness. Negative for falls.   Gastrointestinal: Negative for abdominal pain, nausea and vomiting.   Neurological: Negative for focal weakness, numbness and paresthesias.                   Objective:        General: Cynthia is  well-developed, well-nourished, appears stated age, in no acute distress, alert and oriented to time, place and person.     General    Constitutional: She is oriented to person, place, and time. She appears well-developed and well-nourished. No distress.   HENT:   Head: Normocephalic and atraumatic.   Nose: Nose normal.   Eyes: Conjunctivae and EOM are normal. Pupils are equal, round, and reactive to light.   Neck: No JVD present.   Cardiovascular: Normal rate, regular rhythm and normal heart sounds.    Pulmonary/Chest: Effort normal and breath sounds normal. No respiratory distress.   Abdominal: Soft. Bowel sounds are normal. She exhibits no distension. There is no abdominal tenderness.   Neurological: She is alert and oriented to person, place, and time. She has normal reflexes. Coordination normal.   Psychiatric: She has a normal mood and affect. Her behavior is normal. Judgment and thought content normal.     General Musculoskeletal Exam   Gait: antalgic, abnormal and Trendelenburg   Pelvic Obliquity: moderate      Right Knee Exam     Inspection   Effusion: absent    Left Knee Exam     Inspection   Effusion: absent    Right Hip Exam     Inspection   Swelling: absent  Bruising: absent  No deformity of hip.  Erythema: absent    Tenderness   The patient tender to palpation of the adductor insertion.    Range of Motion   Abduction: 45   Adduction:  30 (+pain) abnormal   Extension: 10   Flexion: 120   External rotation: 60   Internal rotation: 30     Tests   Pain w/ forced internal rotation (ILA): absent  Pain w/ forced external rotation (FADIR): absent  Lizzie: negative  Circumduction test: positive (groin)  Log Roll: negative  Step-down test: negative  Resisted sit-up pain with adductor contraction pain: positive    Other   Sensation: normal    Comments:  + pain weakness with hip extension  Left Hip Exam     Inspection   Swelling: absent  No deformity of hip.  Erythema: absent  Bruising: absent    Range of Motion    Abduction: 45   Adduction: 30   Extension: 10   Flexion: 120   External rotation: 60   Internal rotation: 30     Tests   Pain w/ forced internal rotation (ILA): absent  Pain w/ forced external rotation (FADIR): absent  Stinchfield test: negative  Log Roll: negative  Step-down test: negative    Other   Sensation: normal          Muscle Strength   Right Lower Extremity   Hip Abduction: 5/5   Hip Adduction: 4/5 (+pain)   Hip Flexion: 5/5   Ankle Dorsiflexion:  5/5   Left Lower Extremity   Hip Abduction: 5/5   Hip Adduction: 5/5   Hip Flexion: 5/5   Ankle Dorsiflexion:  5/5     Radiographic Findings:    Abnormal subchondral lucency/sclerosis is observed involving both femoral heads, consistent with bilateral avascular necrosis.  This appearance is similar to the prior CT examination of 06/20/2017, and both femoral heads retain normal contour, with no significant flattening seen on either side.  Hip joint spaces are normally maintained on both sides as well, without significant narrowing.  Remaining osseous structures are unremarkable, with no evidence of recent or healing fracture or lytic destructive process observed.  SI joints appear unremarkable.     Impression:     Abnormal examination, with conventional radiographic findings strongly suggesting bilateral avascular necrosis of the femoral heads.  However, the appearance of the femoral heads is quite similar to the CT examination of 06/20/2017, with no interval femoral head flattening or narrowing of either hip joint space observed.    Xrays of the bilateral hips and pelvis were reviewed by me today. These findings were discussed and reviewed with the patient.    Reviewed previous CT abdomen    MRI PELVIS WITHOUT CONTRAST     CLINICAL HISTORY:  Hip pain, chronic, tendon/ligament abnormality suspected, xray done;adductor pathology and assess AVN;  Strain of muscle, fascia and tendon of pelvis, initial encounter     TECHNIQUE:  Multiplanar, multisequence imaging  of the pelvis and   hip without the use of contrast     COMPARISON:  06/20/2017     FINDINGS:  Osseous Structures: AVN both femoral heads without evidence for subchondral fracture or flattening.  This is been noted on CT examination of 06/20/2017.No marrow signal abnormality to suggest bone marrow replacement process.  There is evidence for fracture at the level of the RIGHT inferior pubic ramus with adjacent bone marrow edema best identified series 3, image 33, series 7, image 1 series 4, image 20.  Additionally, there is focal hyperintensity identified at the level of the superior pubic ramus/acetabular junction on axial series 3, image 26 coronal image 23.  This is also consistent with occult fracture.     Hip and Sacroiliac joints: No evidence of joint effusion .No gross abnormalities of the acetabular roscoe are shown.  MR arthrogram would be necessary for complete evaluation of the roscoe, if clinically indicated.     Bursae: No trochanteric or iliopsoas bursitis.     Soft Tissues: Muscles and tendons of the pelvis and both hips show no atrophy, edema, mass, tears or other abnormalities.     Misc: No abnormal soft tissue masses apart from large LEFT pelvic cyst, measuring 3.3 x 5.1 cm, likely ovarian in etiology for which pelvic ultrasound could further evaluate.     Impression:     Fracture at the level of the RIGHT inferior pubic ramus and superior pubic ramus-acetabular junction, nondisplaced.     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     LEFT pelvic cyst 3.3 x 5.1 cm likely ovarian in etiology.  Although this may be physiologic, pelvic ultrasound recommended for further evaluation.        Assessment:       Encounter Diagnoses   Name Primary?    Closed fracture of ramus of right pubis with routine healing, subsequent encounter Yes    Abnormal MRI, pelvis           Plan:       MRI right hip results reviewed today. We have  discussed a variety of treatment options including medications, injections, physical therapy and other alternative treatments. Recommend conservative fracture care management.  Recommend physical therapy and activity modification.  She is to avoid high impact activities such as running jumping for minimal in 4-6 weeks.      I made the decision to obtain old records of the patient including previous notes and imaging. I independently reviewed and interpreted lab results today as well as prior imaging.     1. Referral to [facility] with aBrt Castañeda DPT for physical therapy.  2. Ice compress to the affected area 2-3x a day for 15-20 minutes as needed for pain management.  3. Discontinue high impact activity/exercise until follow-up  4. MRI results sent to Dr. Patel in GYN for evaluation of cysts.  5. Continue Robaxin 500mg TID PRN  6. Defer labs to PCP, vitamin d etc.  7. RTC to see Juanjo Brown PA-C in 4 weeks for follow-up.    All of the patient's questions were answered and the patient will contact us if they have any questions or concerns in the interim.                        Patient questionnaires may have been collected.

## 2022-04-05 ENCOUNTER — PATIENT MESSAGE (OUTPATIENT)
Dept: SPORTS MEDICINE | Facility: CLINIC | Age: 42
End: 2022-04-05
Payer: COMMERCIAL

## 2022-04-05 DIAGNOSIS — S32.591D CLOSED FRACTURE OF RAMUS OF RIGHT PUBIS WITH ROUTINE HEALING, SUBSEQUENT ENCOUNTER: Primary | ICD-10-CM

## 2022-04-05 DIAGNOSIS — R93.5 ABNORMAL CT OF THE ABDOMEN: ICD-10-CM

## 2022-04-05 DIAGNOSIS — R26.9 ABNORMAL GAIT DUE TO MUSCLE WEAKNESS: ICD-10-CM

## 2022-04-05 DIAGNOSIS — M62.81 ABNORMAL GAIT DUE TO MUSCLE WEAKNESS: ICD-10-CM

## 2022-04-05 DIAGNOSIS — S39.011A STRAIN OF MUSCLE OF RIGHT GROIN REGION: ICD-10-CM

## 2022-04-06 ENCOUNTER — PATIENT MESSAGE (OUTPATIENT)
Dept: SPORTS MEDICINE | Facility: CLINIC | Age: 42
End: 2022-04-06
Payer: COMMERCIAL

## 2022-04-06 DIAGNOSIS — R93.5 ABNORMAL CT OF THE ABDOMEN: ICD-10-CM

## 2022-04-06 DIAGNOSIS — S39.011A STRAIN OF MUSCLE OF RIGHT GROIN REGION: ICD-10-CM

## 2022-04-06 DIAGNOSIS — S32.591D CLOSED FRACTURE OF RAMUS OF RIGHT PUBIS WITH ROUTINE HEALING, SUBSEQUENT ENCOUNTER: Primary | ICD-10-CM

## 2022-04-06 DIAGNOSIS — R26.9 ABNORMAL GAIT DUE TO MUSCLE WEAKNESS: ICD-10-CM

## 2022-04-06 DIAGNOSIS — M62.81 ABNORMAL GAIT DUE TO MUSCLE WEAKNESS: ICD-10-CM

## 2022-04-06 RX ORDER — METHOCARBAMOL 500 MG/1
500 TABLET, FILM COATED ORAL 3 TIMES DAILY PRN
Qty: 40 TABLET | Refills: 0 | Status: SHIPPED | OUTPATIENT
Start: 2022-04-06 | End: 2022-04-07 | Stop reason: SDUPTHER

## 2022-04-07 RX ORDER — METHOCARBAMOL 500 MG/1
500 TABLET, FILM COATED ORAL 3 TIMES DAILY PRN
Qty: 40 TABLET | Refills: 0 | Status: SHIPPED | OUTPATIENT
Start: 2022-04-07 | End: 2022-04-28 | Stop reason: SDUPTHER

## 2022-04-21 ENCOUNTER — TELEPHONE (OUTPATIENT)
Dept: SPORTS MEDICINE | Facility: CLINIC | Age: 42
End: 2022-04-21
Payer: COMMERCIAL

## 2022-04-21 ENCOUNTER — DOCUMENTATION ONLY (OUTPATIENT)
Dept: SPORTS MEDICINE | Facility: CLINIC | Age: 42
End: 2022-04-21
Payer: COMMERCIAL

## 2022-04-25 ENCOUNTER — OFFICE VISIT (OUTPATIENT)
Dept: SPORTS MEDICINE | Facility: CLINIC | Age: 42
End: 2022-04-25
Payer: COMMERCIAL

## 2022-04-25 VITALS
HEART RATE: 87 BPM | WEIGHT: 121.5 LBS | SYSTOLIC BLOOD PRESSURE: 113 MMHG | DIASTOLIC BLOOD PRESSURE: 74 MMHG | BODY MASS INDEX: 20.74 KG/M2 | HEIGHT: 64 IN

## 2022-04-25 DIAGNOSIS — S32.591D CLOSED FRACTURE OF RAMUS OF RIGHT PUBIS WITH ROUTINE HEALING, SUBSEQUENT ENCOUNTER: Primary | ICD-10-CM

## 2022-04-25 DIAGNOSIS — M25.551 RIGHT HIP PAIN: ICD-10-CM

## 2022-04-25 DIAGNOSIS — M25.551 RIGHT HIP PAIN: Primary | ICD-10-CM

## 2022-04-25 PROCEDURE — 99999 PR PBB SHADOW E&M-EST. PATIENT-LVL III: ICD-10-PCS | Mod: PBBFAC,,, | Performed by: PHYSICIAN ASSISTANT

## 2022-04-25 PROCEDURE — 3078F PR MOST RECENT DIASTOLIC BLOOD PRESSURE < 80 MM HG: ICD-10-PCS | Mod: CPTII,S$GLB,, | Performed by: PHYSICIAN ASSISTANT

## 2022-04-25 PROCEDURE — 3008F BODY MASS INDEX DOCD: CPT | Mod: CPTII,S$GLB,, | Performed by: PHYSICIAN ASSISTANT

## 2022-04-25 PROCEDURE — 1159F MED LIST DOCD IN RCRD: CPT | Mod: CPTII,S$GLB,, | Performed by: PHYSICIAN ASSISTANT

## 2022-04-25 PROCEDURE — 3008F PR BODY MASS INDEX (BMI) DOCUMENTED: ICD-10-PCS | Mod: CPTII,S$GLB,, | Performed by: PHYSICIAN ASSISTANT

## 2022-04-25 PROCEDURE — 3074F PR MOST RECENT SYSTOLIC BLOOD PRESSURE < 130 MM HG: ICD-10-PCS | Mod: CPTII,S$GLB,, | Performed by: PHYSICIAN ASSISTANT

## 2022-04-25 PROCEDURE — 99999 PR PBB SHADOW E&M-EST. PATIENT-LVL III: CPT | Mod: PBBFAC,,, | Performed by: PHYSICIAN ASSISTANT

## 2022-04-25 PROCEDURE — 1159F PR MEDICATION LIST DOCUMENTED IN MEDICAL RECORD: ICD-10-PCS | Mod: CPTII,S$GLB,, | Performed by: PHYSICIAN ASSISTANT

## 2022-04-25 PROCEDURE — 3078F DIAST BP <80 MM HG: CPT | Mod: CPTII,S$GLB,, | Performed by: PHYSICIAN ASSISTANT

## 2022-04-25 PROCEDURE — 99214 OFFICE O/P EST MOD 30 MIN: CPT | Mod: S$GLB,,, | Performed by: PHYSICIAN ASSISTANT

## 2022-04-25 PROCEDURE — 3074F SYST BP LT 130 MM HG: CPT | Mod: CPTII,S$GLB,, | Performed by: PHYSICIAN ASSISTANT

## 2022-04-25 PROCEDURE — 99214 PR OFFICE/OUTPT VISIT, EST, LEVL IV, 30-39 MIN: ICD-10-PCS | Mod: S$GLB,,, | Performed by: PHYSICIAN ASSISTANT

## 2022-04-25 NOTE — PROGRESS NOTES
Subjective:          Chief Complaint: Cynthia Dailey is a 41 y.o. female who had concerns including Pain of the Right Hip.    Interval hx: Pt presents for follow up evaluation of right hip pain.  Overall, patient reports significant improvement of pain, activity toleration, and range of motion of the right hip.  She has been biking approximately 30 miles a week for the past 1-2 weeks.  She states that after prolonged sitting or biking she does experience some discomfort, however this has been improving.  She has not been running.  She continues formal physical therapy at [facility] with Bart Castañeda and is progressing well. She has been taking OTC acetaminophen as needed for pain and has added Vitamin D to her vitamin regimen.     Previous HPI: Cynthia Dailey is a  and a runner (6 days a week, 50-55 miles a week). Rides bike or swim other day of the week. The pain started 3 weeks ago while running, around the end of her 8-mile run. Pain is becoming progressively worse, compensating gait, now pain with walking. Pain is located over (points to) anterior hip, groin. She reports that the pain is a 4 /10 sharp and aching pain today and not responding adequately to conservative measures which have included activity modifications, rest, and oral medication (iburporfen, tylenol). Is affecting ADLs and limiting desired level of activity. Denies numbness, tingling, radiation.  Pain is 9 /10 at its worst    Mechanical symptoms: none  Subjective instability: (+)   Worse with walking, being (hip flexion)  Better with rest.   Nocturnal symptoms: (+)    No previous surgeries or trauma on hips     Previous saw an Orthopedist in [place], 5 years ago for AVN after abnormal CT results. Treated with observation because she was not running then        Review of Systems   Constitutional: Negative for chills and fever.   HENT: Negative for congestion and sore throat.    Eyes: Negative for discharge and double vision.    Cardiovascular: Negative for chest pain, palpitations and syncope.   Respiratory: Negative for cough and shortness of breath.    Endocrine: Negative for cold intolerance and heat intolerance.   Skin: Negative for dry skin and rash.   Musculoskeletal: Positive for joint pain and muscle weakness. Negative for falls.   Gastrointestinal: Negative for abdominal pain, nausea and vomiting.   Neurological: Negative for focal weakness, numbness and paresthesias.                   Objective:        General: Cynthia is well-developed, well-nourished, appears stated age, in no acute distress, alert and oriented to time, place and person.     General    Constitutional: She is oriented to person, place, and time. She appears well-developed and well-nourished. No distress.   HENT:   Head: Normocephalic and atraumatic.   Nose: Nose normal.   Eyes: Conjunctivae and EOM are normal. Pupils are equal, round, and reactive to light.   Neck: No JVD present.   Cardiovascular: Normal rate, regular rhythm and normal heart sounds.    Pulmonary/Chest: Effort normal and breath sounds normal. No respiratory distress.   Abdominal: Soft. Bowel sounds are normal. She exhibits no distension. There is no abdominal tenderness.   Neurological: She is alert and oriented to person, place, and time. She has normal reflexes. Coordination normal.   Psychiatric: She has a normal mood and affect. Her behavior is normal. Judgment and thought content normal.     General Musculoskeletal Exam   Gait: nonantalgic  Pelvic Obliquity: moderate      Right Knee Exam     Inspection   Effusion: absent    Left Knee Exam     Inspection   Effusion: absent    Right Hip Exam     Inspection   Swelling: absent  Bruising: absent  No deformity of hip.  Erythema: absent    Tenderness   The patient tender to palpation of the pubic symphisis.    Range of Motion   Abduction: 45   Adduction:  30   Extension: 20   Flexion: 120   External rotation: 60   Internal rotation: 30     Tests    Pain w/ forced internal rotation (ILA): absent  Pain w/ forced external rotation (FADIR): absent  Lizzie: negative  Circumduction test: negative  Log Roll: negative  Step-down test: negative  Resisted sit-up pain with adductor contraction pain: negative    Other   Sensation: normal    Comments:    Left Hip Exam     Inspection   Swelling: absent  No deformity of hip.  Erythema: absent  Bruising: absent    Range of Motion   Abduction: 45   Adduction: 30   Extension: 20   Flexion: 120   External rotation: 60   Internal rotation: 30     Tests   Pain w/ forced internal rotation (ILA): absent  Pain w/ forced external rotation (FADIR): absent  Stinchfield test: negative  Log Roll: negative  Step-down test: negative  Lizzie:  negative    Other   Sensation: normal          Muscle Strength   Right Lower Extremity   Hip Abduction: 5/5   Hip Adduction: 5/5   Hip Flexion: 5/5   Ankle Dorsiflexion:  5/5   Left Lower Extremity   Hip Abduction: 5/5   Hip Adduction: 5/5   Hip Flexion: 5/5   Ankle Dorsiflexion:  5/5     Radiographic Findings:    Abnormal subchondral lucency/sclerosis is observed involving both femoral heads, consistent with bilateral avascular necrosis.  This appearance is similar to the prior CT examination of 06/20/2017, and both femoral heads retain normal contour, with no significant flattening seen on either side.  Hip joint spaces are normally maintained on both sides as well, without significant narrowing.  Remaining osseous structures are unremarkable, with no evidence of recent or healing fracture or lytic destructive process observed.  SI joints appear unremarkable.     Impression:     Abnormal examination, with conventional radiographic findings strongly suggesting bilateral avascular necrosis of the femoral heads.  However, the appearance of the femoral heads is quite similar to the CT examination of 06/20/2017, with no interval femoral head flattening or narrowing of either hip joint space  observed.    Xrays of the bilateral hips and pelvis were reviewed by me today. These findings were discussed and reviewed with the patient.    Reviewed previous CT abdomen    MRI PELVIS WITHOUT CONTRAST     CLINICAL HISTORY:  Hip pain, chronic, tendon/ligament abnormality suspected, xray done;adductor pathology and assess AVN;  Strain of muscle, fascia and tendon of pelvis, initial encounter     TECHNIQUE:  Multiplanar, multisequence imaging of the pelvis and   hip without the use of contrast     COMPARISON:  06/20/2017     FINDINGS:  Osseous Structures: AVN both femoral heads without evidence for subchondral fracture or flattening.  This is been noted on CT examination of 06/20/2017.No marrow signal abnormality to suggest bone marrow replacement process.  There is evidence for fracture at the level of the RIGHT inferior pubic ramus with adjacent bone marrow edema best identified series 3, image 33, series 7, image 1 series 4, image 20.  Additionally, there is focal hyperintensity identified at the level of the superior pubic ramus/acetabular junction on axial series 3, image 26 coronal image 23.  This is also consistent with occult fracture.     Hip and Sacroiliac joints: No evidence of joint effusion .No gross abnormalities of the acetabular roscoe are shown.  MR arthrogram would be necessary for complete evaluation of the roscoe, if clinically indicated.     Bursae: No trochanteric or iliopsoas bursitis.     Soft Tissues: Muscles and tendons of the pelvis and both hips show no atrophy, edema, mass, tears or other abnormalities.     Misc: No abnormal soft tissue masses apart from large LEFT pelvic cyst, measuring 3.3 x 5.1 cm, likely ovarian in etiology for which pelvic ultrasound could further evaluate.     Impression:     Fracture at the level of the RIGHT inferior pubic ramus and superior pubic ramus-acetabular junction, nondisplaced.     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     LEFT pelvic cyst 3.3 x 5.1 cm likely ovarian in etiology.  Although this may be physiologic, pelvic ultrasound recommended for further evaluation.        Assessment:       Encounter Diagnoses   Name Primary?    Closed fracture of ramus of right pubis with routine healing, subsequent encounter Yes    Right hip pain           Plan:       MRI right hip results reviewed today. We have discussed a variety of treatment options including medications, injections, physical therapy and other alternative treatments. Recommend conservative fracture care management.  Recommend physical therapy and activity modification.  She is to avoid high impact activities such as running jumping for minimal in 4-6 weeks.      I made the decision to obtain old records of the patient including previous notes and imaging. I independently reviewed and interpreted lab results today as well as prior imaging.     1. Continue formal physical therapy at [facility] with Bart Castañeda DPT  2. Ice compress to the affected area 2-3x a day for 15-20 minutes as needed for pain management.  3. May continue biking and slow return to high impact exercise as tolerated.  Emphasized the importance of gradual progression.  4. Continue Robaxin 500mg TID PRN  5. Defer labs to PCP, vitamin d etc.  6. RTC in 3-4 weeks with repeat x-ray for follow-up.    All of the patient's questions were answered and the patient will contact us if they have any questions or concerns in the interim.

## 2022-04-27 ENCOUNTER — PATIENT MESSAGE (OUTPATIENT)
Dept: SPORTS MEDICINE | Facility: CLINIC | Age: 42
End: 2022-04-27
Payer: COMMERCIAL

## 2022-04-27 DIAGNOSIS — M62.81 ABNORMAL GAIT DUE TO MUSCLE WEAKNESS: ICD-10-CM

## 2022-04-27 DIAGNOSIS — R93.5 ABNORMAL CT OF THE ABDOMEN: ICD-10-CM

## 2022-04-27 DIAGNOSIS — S39.011A STRAIN OF MUSCLE OF RIGHT GROIN REGION: Primary | ICD-10-CM

## 2022-04-27 DIAGNOSIS — R26.9 ABNORMAL GAIT DUE TO MUSCLE WEAKNESS: ICD-10-CM

## 2022-04-28 ENCOUNTER — OFFICE VISIT (OUTPATIENT)
Dept: OBSTETRICS AND GYNECOLOGY | Facility: CLINIC | Age: 42
End: 2022-04-28
Payer: COMMERCIAL

## 2022-04-28 ENCOUNTER — LAB VISIT (OUTPATIENT)
Dept: LAB | Facility: OTHER | Age: 42
End: 2022-04-28
Attending: OBSTETRICS & GYNECOLOGY
Payer: COMMERCIAL

## 2022-04-28 VITALS
SYSTOLIC BLOOD PRESSURE: 102 MMHG | BODY MASS INDEX: 20.89 KG/M2 | WEIGHT: 122.38 LBS | DIASTOLIC BLOOD PRESSURE: 64 MMHG | HEIGHT: 64 IN

## 2022-04-28 DIAGNOSIS — Z12.31 ENCOUNTER FOR SCREENING MAMMOGRAM FOR MALIGNANT NEOPLASM OF BREAST: ICD-10-CM

## 2022-04-28 DIAGNOSIS — N94.19 DYSPAREUNIA DUE TO MEDICAL CONDITION IN FEMALE: ICD-10-CM

## 2022-04-28 DIAGNOSIS — Z01.419 WELL WOMAN EXAM WITH ROUTINE GYNECOLOGICAL EXAM: Primary | ICD-10-CM

## 2022-04-28 DIAGNOSIS — Z12.4 SCREENING FOR CERVICAL CANCER: ICD-10-CM

## 2022-04-28 DIAGNOSIS — N83.202 LEFT OVARIAN CYST: ICD-10-CM

## 2022-04-28 DIAGNOSIS — Z01.419 WELL WOMAN EXAM WITH ROUTINE GYNECOLOGICAL EXAM: ICD-10-CM

## 2022-04-28 PROCEDURE — 1159F PR MEDICATION LIST DOCUMENTED IN MEDICAL RECORD: ICD-10-PCS | Mod: CPTII,S$GLB,, | Performed by: OBSTETRICS & GYNECOLOGY

## 2022-04-28 PROCEDURE — 99396 PR PREVENTIVE VISIT,EST,40-64: ICD-10-PCS | Mod: S$GLB,,, | Performed by: OBSTETRICS & GYNECOLOGY

## 2022-04-28 PROCEDURE — 3008F PR BODY MASS INDEX (BMI) DOCUMENTED: ICD-10-PCS | Mod: CPTII,S$GLB,, | Performed by: OBSTETRICS & GYNECOLOGY

## 2022-04-28 PROCEDURE — 3008F BODY MASS INDEX DOCD: CPT | Mod: CPTII,S$GLB,, | Performed by: OBSTETRICS & GYNECOLOGY

## 2022-04-28 PROCEDURE — 36415 COLL VENOUS BLD VENIPUNCTURE: CPT | Performed by: OBSTETRICS & GYNECOLOGY

## 2022-04-28 PROCEDURE — 1159F MED LIST DOCD IN RCRD: CPT | Mod: CPTII,S$GLB,, | Performed by: OBSTETRICS & GYNECOLOGY

## 2022-04-28 PROCEDURE — 99999 PR PBB SHADOW E&M-EST. PATIENT-LVL IV: ICD-10-PCS | Mod: PBBFAC,,, | Performed by: OBSTETRICS & GYNECOLOGY

## 2022-04-28 PROCEDURE — 99214 PR OFFICE/OUTPT VISIT, EST, LEVL IV, 30-39 MIN: ICD-10-PCS | Mod: 25,S$GLB,, | Performed by: OBSTETRICS & GYNECOLOGY

## 2022-04-28 PROCEDURE — 82607 VITAMIN B-12: CPT | Performed by: OBSTETRICS & GYNECOLOGY

## 2022-04-28 PROCEDURE — 87624 HPV HI-RISK TYP POOLED RSLT: CPT | Performed by: OBSTETRICS & GYNECOLOGY

## 2022-04-28 PROCEDURE — 99999 PR PBB SHADOW E&M-EST. PATIENT-LVL IV: CPT | Mod: PBBFAC,,, | Performed by: OBSTETRICS & GYNECOLOGY

## 2022-04-28 PROCEDURE — 99214 OFFICE O/P EST MOD 30 MIN: CPT | Mod: 25,S$GLB,, | Performed by: OBSTETRICS & GYNECOLOGY

## 2022-04-28 PROCEDURE — 3074F PR MOST RECENT SYSTOLIC BLOOD PRESSURE < 130 MM HG: ICD-10-PCS | Mod: CPTII,S$GLB,, | Performed by: OBSTETRICS & GYNECOLOGY

## 2022-04-28 PROCEDURE — 3078F DIAST BP <80 MM HG: CPT | Mod: CPTII,S$GLB,, | Performed by: OBSTETRICS & GYNECOLOGY

## 2022-04-28 PROCEDURE — 1160F PR REVIEW ALL MEDS BY PRESCRIBER/CLIN PHARMACIST DOCUMENTED: ICD-10-PCS | Mod: CPTII,S$GLB,, | Performed by: OBSTETRICS & GYNECOLOGY

## 2022-04-28 PROCEDURE — 88175 CYTOPATH C/V AUTO FLUID REDO: CPT | Performed by: OBSTETRICS & GYNECOLOGY

## 2022-04-28 PROCEDURE — 99396 PREV VISIT EST AGE 40-64: CPT | Mod: S$GLB,,, | Performed by: OBSTETRICS & GYNECOLOGY

## 2022-04-28 PROCEDURE — 3078F PR MOST RECENT DIASTOLIC BLOOD PRESSURE < 80 MM HG: ICD-10-PCS | Mod: CPTII,S$GLB,, | Performed by: OBSTETRICS & GYNECOLOGY

## 2022-04-28 PROCEDURE — 1160F RVW MEDS BY RX/DR IN RCRD: CPT | Mod: CPTII,S$GLB,, | Performed by: OBSTETRICS & GYNECOLOGY

## 2022-04-28 PROCEDURE — 3074F SYST BP LT 130 MM HG: CPT | Mod: CPTII,S$GLB,, | Performed by: OBSTETRICS & GYNECOLOGY

## 2022-04-28 RX ORDER — METHOCARBAMOL 500 MG/1
500 TABLET, FILM COATED ORAL 3 TIMES DAILY PRN
Qty: 40 TABLET | Refills: 0 | Status: SHIPPED | OUTPATIENT
Start: 2022-04-28 | End: 2023-01-05

## 2022-04-29 LAB — VIT B12 SERPL-MCNC: 682 NG/L (ref 180–914)

## 2022-05-01 LAB — 1,25(OH)2D SERPL-MCNC: 60.6 PG/ML (ref 19.9–79.3)

## 2022-05-03 NOTE — PROGRESS NOTES
Subjective:       Patient ID: Cynthia Dailey is a 41 y.o. female.    Chief Complaint:  Well Woman      History of Present Illness  HPI  Annual Exam-Premenopausal  Patient presents for annual exam. The patient has no complaints today. The patient is sexually active. GYN screening history: last pap: was normal. The patient wears seatbelts: yes. The patient participates in regular exercise: yes. Has the patient ever been transfused or tattooed?: no. The patient reports that there is not domestic violence in her life.    GYN & OB History  Patient's last menstrual period was 2022.   Date of Last Pap: No result found    OB History    Para Term  AB Living   0 0 0 0 0 0   SAB IAB Ectopic Multiple Live Births   0 0 0 0 0       Review of Systems  Review of Systems   Constitutional: Negative for activity change, appetite change and fatigue.   HENT: Negative.  Negative for tinnitus.    Eyes: Negative.    Respiratory: Negative for cough and shortness of breath.    Cardiovascular: Negative for chest pain and palpitations.   Gastrointestinal: Negative.  Negative for abdominal pain, blood in stool, constipation, diarrhea and nausea.   Endocrine: Negative.  Negative for hot flashes.   Genitourinary: Negative for dysmenorrhea, dyspareunia, dysuria, frequency, menstrual problem, pelvic pain, vaginal discharge, urinary incontinence and postcoital bleeding.   Musculoskeletal: Negative for back pain and joint swelling.   Integumentary:  Negative for rash, breast mass, nipple discharge and breast skin changes.   Neurological: Negative.  Negative for headaches.   Hematological: Negative.  Does not bruise/bleed easily.   Psychiatric/Behavioral: The patient is not nervous/anxious.    Breast: negative.  Negative for mass, nipple discharge and skin changes          Objective:    Physical Exam:   Constitutional: Vital signs are normal. She appears well-developed and well-nourished. She is active and cooperative. She does not  appear ill. No distress.    HENT:   Head: Normocephalic and atraumatic.   Nose: Nose normal.   Mouth/Throat: Oropharynx is clear and moist and mucous membranes are normal.    Eyes: Pupils are equal, round, and reactive to light. Conjunctivae, EOM and lids are normal.    Neck: No thyroid mass and no thyromegaly present.    Cardiovascular: Normal rate, regular rhythm, S1 normal, S2 normal, normal heart sounds and normal pulses.     Pulmonary/Chest: Effort normal and breath sounds normal. No accessory muscle usage. Right breast exhibits no inverted nipple, no mass, no nipple discharge, no skin change, no tenderness and no swelling. Left breast exhibits no inverted nipple, no mass, no nipple discharge, no skin change, no tenderness and no swelling.        Abdominal: Soft. Bowel sounds are normal. She exhibits no distension and no mass. There is no abdominal tenderness. There is no rebound and no guarding.     Genitourinary:    Vagina and uterus normal.      Pelvic exam was performed with patient supine.   The external female genitalia was normal.   Labial bartholins normal.There is no tenderness or injury on the right labia. There is no tenderness or injury on the left labia. Cervix is normal. Right adnexum displays no mass and no fullness. Left adnexum displays no mass and no fullness. No erythema,  no vaginal discharge, rectocele or cystocele in the vagina. Cervix exhibits no motion tenderness and no discharge. Uterus is not deviated and not hosting fibroids. Normal urethral meatus.Urethral Meatus exhibits: urethral lesionUrethra findings: no tendernessBladder findings: no bladder tenderness              Neurological: She is alert. She has normal strength.    Skin: Skin is warm and dry.    Psychiatric: She has a normal mood and affect. Her behavior is normal. Thought content normal. Her mood appears not anxious. Her affect is not inappropriate. Her speech is not slurred. She is not agitated and not aggressive. Thought  content is not paranoid. She expresses no suicidal plans and no homicidal plans.          Assessment:        1. Well woman exam with routine gynecological exam    2. Screening for cervical cancer    3. Encounter for screening mammogram for malignant neoplasm of breast                        Plan:      Cynthia was seen today for well woman.    Diagnoses and all orders for this visit:    Well woman exam with routine gynecological exam  -     Vitamin B12 Deficiency Panel; Future  -     Calcitriol (1,25 di-OH Vitamin D); Future  -     Calcitriol (1,25 di-OH Vitamin D)    Screening for cervical cancer  -     Cancel: Liquid-Based Pap Smear, Screening  -     Cancel: HPV High Risk Genotypes, PCR    Encounter for screening mammogram for malignant neoplasm of breast  -     Mammo Digital Screening Bilat w/ Tony; Future

## 2022-05-03 NOTE — PROGRESS NOTES
Subjective:       Patient ID: Cynthia Dailey is a 41 y.o. female.    Chief Complaint:  Pelvic pain      History of Present Illness  HPI  Pt is 41 y.o. with Patient's last menstrual period was 04/07/2022. here for 2 problems:  1) ovarian cyst -- Pt has been suffering with some hip and pelvic pain.  As part of her workup, an MRI was performed and the following was found:    EXAMINATION:  MRI PELVIS WITHOUT CONTRAST     CLINICAL HISTORY:  Hip pain, chronic, tendon/ligament abnormality suspected, xray done;adductor pathology and assess AVN;  Strain of muscle, fascia and tendon of pelvis, initial encounter     TECHNIQUE:  Multiplanar, multisequence imaging of the pelvis and   hip without the use of contrast     COMPARISON:  06/20/2017     FINDINGS:  Osseous Structures: AVN both femoral heads without evidence for subchondral fracture or flattening.  This is been noted on CT examination of 06/20/2017.No marrow signal abnormality to suggest bone marrow replacement process.  There is evidence for fracture at the level of the RIGHT inferior pubic ramus with adjacent bone marrow edema best identified series 3, image 33, series 7, image 1 series 4, image 20.  Additionally, there is focal hyperintensity identified at the level of the superior pubic ramus/acetabular junction on axial series 3, image 26 coronal image 23.  This is also consistent with occult fracture.     Hip and Sacroiliac joints: No evidence of joint effusion .No gross abnormalities of the acetabular roscoe are shown.  MR arthrogram would be necessary for complete evaluation of the roscoe, if clinically indicated.     Bursae: No trochanteric or iliopsoas bursitis.     Soft Tissues: Muscles and tendons of the pelvis and both hips show no atrophy, edema, mass, tears or other abnormalities.     Misc: No abnormal soft tissue masses apart from large LEFT pelvic cyst, measuring 3.3 x 5.1 cm, likely ovarian in etiology for which pelvic ultrasound could further  evaluate.     Impression:     Fracture at the level of the RIGHT inferior pubic ramus and superior pubic ramus-acetabular junction, nondisplaced.     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     LEFT pelvic cyst 3.3 x 5.1 cm likely ovarian in etiology.  Although this may be physiologic, pelvic ultrasound recommended for further evaluation.     This report was flagged in Epic as abnormal.    Here to get more information about the left ovarian cyst    2)  Dyspareunia -- pt has been struggling with painful intercourse and inability to orgasm.  Note pt had hx of rectopexy and she had complications with nerve damage.  She has not been able to have an orgasm even with masturbation.  Leading to sexual dysfunction, decreased libido.  Has a supportive partner.  Has already seen pelvic floor PT and tried dilator therapy, used increased lubrication, all without relief.  Wanted to know if there were other alternatives.    GYN & OB History  Patient's last menstrual period was 2022.   Date of Last Pap: No result found    OB History    Para Term  AB Living   0 0 0 0 0 0   SAB IAB Ectopic Multiple Live Births   0 0 0 0 0       Review of Systems  Review of Systems   Constitutional: Negative for activity change, appetite change and fatigue.   HENT: Negative.  Negative for tinnitus.    Eyes: Negative.    Respiratory: Negative for cough and shortness of breath.    Cardiovascular: Negative for chest pain and palpitations.   Gastrointestinal: Negative.  Negative for abdominal pain, blood in stool, constipation, diarrhea and nausea.   Endocrine: Negative.  Negative for hot flashes.   Genitourinary: Positive for decreased libido, dyspareunia and pelvic pain. Negative for dysmenorrhea, dysuria, frequency, menstrual problem, vaginal discharge, urinary incontinence and postcoital bleeding.   Musculoskeletal: Negative for back pain and joint  swelling.   Integumentary:  Negative for rash, breast mass, nipple discharge and breast skin changes.   Neurological: Negative.  Negative for headaches.   Hematological: Negative.  Does not bruise/bleed easily.   Psychiatric/Behavioral: The patient is not nervous/anxious.    Breast: negative.  Negative for mass, nipple discharge and skin changes          Objective:    Physical Exam     Pelvic:  Able to tolerate speculum exam and BME.  + pelvic guarding.  No CMT.  No point tenderness.  No vulvodynia.  No adnexal fullness  Assessment:                    1. Left ovarian cyst    2. Dyspareunia due to medical condition in female                Plan:      Cynthia was seen today for pelvic pain.    Diagnoses and all orders for this visit:    Left ovarian cyst  -     US Pelvis Comp with Transvag NON-OB (xpd; Future   will coordinate ultrasound to better assess the mass.  Likely functional .  No fullness noted.  Can continue to use nsaid's to help with rare episodes of pain.    Dyspareunia due to medical condition in female  -     Ambulatory referral/consult to [facility]; Future  I spent a total of 35 minutes on the day of the visit.This includes face to face time and non-face to face time preparing to see the patient (eg, review of tests), obtaining and/or reviewing separately obtained history, documenting clinical information in the electronic or other health record, independently interpreting results and communicating results to the patient/family/caregiver, or care coordinator.  We had a long discussion about the sexual dysfunction.  Challenging case as there is likely a neurologic component to her issue.  We will see if she can meet with our sexual dysfunction team to look into partner therapy.  Also given brochures about intrarosa -- although not sure if this would help.  And finally, could consider compounded vulvar scream cream to see if this helps with enhanced clitoral sensation.

## 2022-05-04 LAB
CLINICAL INFO: NORMAL
CYTO CVX: NORMAL
CYTOLOGIST CVX/VAG CYTO: NORMAL
CYTOLOGIST CVX/VAG CYTO: NORMAL
CYTOLOGY CMNT CVX/VAG CYTO-IMP: NORMAL
CYTOLOGY PAP THIN PREP EXPLANATION: NORMAL
DATE OF PREVIOUS PAP: NO
DATE PREVIOUS BX: NO
GEN CATEG CVX/VAG CYTO-IMP: NORMAL
HPV I/H RISK 4 DNA CVX QL NAA+PROBE: NOT DETECTED
LMP START DATE: NORMAL
MICROORGANISM CVX/VAG CYTO: NORMAL
PATHOLOGIST CVX/VAG CYTO: NORMAL
SERVICE CMNT-IMP: NORMAL
SPECIMEN SOURCE CVX/VAG CYTO: NORMAL
STAT OF ADQ CVX/VAG CYTO-IMP: NORMAL

## 2022-05-13 ENCOUNTER — TELEPHONE (OUTPATIENT)
Dept: SPORTS MEDICINE | Facility: CLINIC | Age: 42
End: 2022-05-13
Payer: COMMERCIAL

## 2022-05-13 NOTE — TELEPHONE ENCOUNTER
The patient was notified that the X-Ray machine at [facility] is broken and they predict it to be working in time for the appt. The options of getting the XR at another location or rescheduling the appt were offered.      Since she does not have a car to drive to [facility], she would like to reschedule if the XR machine is not working by Monday. We will reach out to her on Monday morning to let her know.

## 2022-05-18 ENCOUNTER — OFFICE VISIT (OUTPATIENT)
Dept: OBSTETRICS AND GYNECOLOGY | Facility: CLINIC | Age: 42
End: 2022-05-18
Payer: COMMERCIAL

## 2022-05-18 ENCOUNTER — LAB VISIT (OUTPATIENT)
Dept: LAB | Facility: OTHER | Age: 42
End: 2022-05-18
Attending: PHYSICIAN ASSISTANT
Payer: COMMERCIAL

## 2022-05-18 VITALS
HEIGHT: 64 IN | BODY MASS INDEX: 20.97 KG/M2 | DIASTOLIC BLOOD PRESSURE: 70 MMHG | SYSTOLIC BLOOD PRESSURE: 106 MMHG | WEIGHT: 122.81 LBS

## 2022-05-18 DIAGNOSIS — N94.19 DYSPAREUNIA DUE TO MEDICAL CONDITION IN FEMALE: ICD-10-CM

## 2022-05-18 DIAGNOSIS — R68.82 LOW LIBIDO: Primary | ICD-10-CM

## 2022-05-18 DIAGNOSIS — R68.82 LOW LIBIDO: ICD-10-CM

## 2022-05-18 DIAGNOSIS — F52.0 HYPOACTIVE SEXUAL DESIRE DISORDER: ICD-10-CM

## 2022-05-18 LAB
ESTRADIOL SERPL-MCNC: 19 PG/ML
FSH SERPL-ACNC: 7.46 MIU/ML

## 2022-05-18 PROCEDURE — 3008F PR BODY MASS INDEX (BMI) DOCUMENTED: ICD-10-PCS | Mod: CPTII,S$GLB,, | Performed by: PHYSICIAN ASSISTANT

## 2022-05-18 PROCEDURE — 1160F RVW MEDS BY RX/DR IN RCRD: CPT | Mod: CPTII,S$GLB,, | Performed by: PHYSICIAN ASSISTANT

## 2022-05-18 PROCEDURE — 1160F PR REVIEW ALL MEDS BY PRESCRIBER/CLIN PHARMACIST DOCUMENTED: ICD-10-PCS | Mod: CPTII,S$GLB,, | Performed by: PHYSICIAN ASSISTANT

## 2022-05-18 PROCEDURE — 1159F MED LIST DOCD IN RCRD: CPT | Mod: CPTII,S$GLB,, | Performed by: PHYSICIAN ASSISTANT

## 2022-05-18 PROCEDURE — 3078F PR MOST RECENT DIASTOLIC BLOOD PRESSURE < 80 MM HG: ICD-10-PCS | Mod: CPTII,S$GLB,, | Performed by: PHYSICIAN ASSISTANT

## 2022-05-18 PROCEDURE — 99999 PR PBB SHADOW E&M-EST. PATIENT-LVL IV: ICD-10-PCS | Mod: PBBFAC,,, | Performed by: PHYSICIAN ASSISTANT

## 2022-05-18 PROCEDURE — 99214 OFFICE O/P EST MOD 30 MIN: CPT | Mod: S$GLB,,, | Performed by: PHYSICIAN ASSISTANT

## 2022-05-18 PROCEDURE — 83001 ASSAY OF GONADOTROPIN (FSH): CPT | Performed by: PHYSICIAN ASSISTANT

## 2022-05-18 PROCEDURE — 99999 PR PBB SHADOW E&M-EST. PATIENT-LVL IV: CPT | Mod: PBBFAC,,, | Performed by: PHYSICIAN ASSISTANT

## 2022-05-18 PROCEDURE — 84402 ASSAY OF FREE TESTOSTERONE: CPT | Performed by: PHYSICIAN ASSISTANT

## 2022-05-18 PROCEDURE — 3008F BODY MASS INDEX DOCD: CPT | Mod: CPTII,S$GLB,, | Performed by: PHYSICIAN ASSISTANT

## 2022-05-18 PROCEDURE — 99214 PR OFFICE/OUTPT VISIT, EST, LEVL IV, 30-39 MIN: ICD-10-PCS | Mod: S$GLB,,, | Performed by: PHYSICIAN ASSISTANT

## 2022-05-18 PROCEDURE — 3078F DIAST BP <80 MM HG: CPT | Mod: CPTII,S$GLB,, | Performed by: PHYSICIAN ASSISTANT

## 2022-05-18 PROCEDURE — 36415 COLL VENOUS BLD VENIPUNCTURE: CPT | Performed by: PHYSICIAN ASSISTANT

## 2022-05-18 PROCEDURE — 3074F PR MOST RECENT SYSTOLIC BLOOD PRESSURE < 130 MM HG: ICD-10-PCS | Mod: CPTII,S$GLB,, | Performed by: PHYSICIAN ASSISTANT

## 2022-05-18 PROCEDURE — 1159F PR MEDICATION LIST DOCUMENTED IN MEDICAL RECORD: ICD-10-PCS | Mod: CPTII,S$GLB,, | Performed by: PHYSICIAN ASSISTANT

## 2022-05-18 PROCEDURE — 3074F SYST BP LT 130 MM HG: CPT | Mod: CPTII,S$GLB,, | Performed by: PHYSICIAN ASSISTANT

## 2022-05-18 PROCEDURE — 82670 ASSAY OF TOTAL ESTRADIOL: CPT | Performed by: PHYSICIAN ASSISTANT

## 2022-05-18 RX ORDER — BREMELANOTIDE 1.75 MG/.3ML
1.75 INJECTION SUBCUTANEOUS
Qty: 4 EACH | Refills: 5 | Status: SHIPPED | OUTPATIENT
Start: 2022-05-18

## 2022-05-18 NOTE — PROGRESS NOTES
Subjective:      Cynthia Dailey is a 41 y.o. female who presents for low libido.  Reports dyspareunia since her rectopexy at [facility] [place] in 2015. She has been working with Dr. Patel since 2017 without improvement. Has tried compounded LIDOCAINE 2 %, VALIUM 5 MG, BACLOFEN 4 % SUPPOSITORY, pelvic floor PT multiple times and dilators and nothing has helped. She still has regular periods LMP 5/17/2022. She denies hot flashes or vaginal dryness. No desire to have sex and when she does have intercourse, she is unable to climax. She reports a good relationship with her  who is very supportive. She is very active and runs/bikes regularly. History of depression that she controls with exercise and daily micro dosing with mushrooms. She started micro dosing 2 years ago and libido was low prior this. She is unsure at this point if the libido and difficulty climaxing is due to dyspareunia or vice versa.     Orders Only on 04/28/2022   Component Date Value Ref Range Status    Calcitriol(1,25 di-OH Vit D) 04/28/2022 60.6  19.9 - 79.3 pg/mL Final   Lab Visit on 04/28/2022   Component Date Value Ref Range Status    Vitamin B-12 04/28/2022 682  180 - 914 ng/L Final   Office Visit on 04/28/2022   Component Date Value Ref Range Status    Cytology ThinPrep Pap Source 04/28/2022 Cervix   Final    Cytology ThinPrep Pap Report Status 04/28/2022 DNR   Final    Cytology Thinprep PAP Clinical His* 04/28/2022 Routine exam   Corrected    Cytology ThinPrep Pap LMP 04/28/2022 [id number]   Final    Cytology ThinPrep Previous PAP 04/28/2022 No   Final    Cytology ThinPrep Previous Biopsy 04/28/2022 No   Final    Cytology ThinPrep PAP Adequacy 04/28/2022 SEE BELOW   Final    Cytology ThinPrep PAP General Chay[place]* 04/28/2022 DNR   Final    Cytology ThinPrep PAP Interpretati* 04/28/2022 SEE BELOW   Final    Cytology ThinPrep PAP Comment 04/28/2022 SEE BELOW   Final    Cytotechnologist 04/28/2022 SEE BELOW   Final    Review Cytotechnologist  04/28/2022 DNR   Final    Pathologist 04/28/2022 DNR   Final    Cytology ThinPrep PAP Infection 04/28/2022 DNR   Final    Cytology Thin Prep Pap Explanation 04/28/2022 SEE BELOW   Final    HPV DNA High Risk 04/28/2022 Not Detected  NOT DETECTED Final       Past Medical History:   Diagnosis Date    ADD (attention deficit disorder)     Anxiety     Asthma     Corns and callosities     Depression     Dislocated jaw     GERD (gastroesophageal reflux disease)     Rectal prolapse     Wears glasses      Past Surgical History:   Procedure Laterality Date    CHOLECYSTECTOMY      COLON SURGERY  Jan 2015    Rectal Prolaps    COLONOSCOPY N/A 10/12/2017    Procedure: Colonoscopy;  Surgeon: Lucy Cortes MD;  Location: [address]);  Service: Endoscopy;  Laterality: N/A;    MANDIBLE FRACTURE SURGERY      RECTAL PROLAPSE REPAIR, RECTOPEXY       Social History     Tobacco Use    Smoking status: Former Smoker     Packs/day: 0.75     Years: 8.00     Pack years: 6.00     Types: Cigarettes     Quit date: 11/20/2006     Years since quitting: 15.5    Smokeless tobacco: Never Used   Substance Use Topics    Alcohol use: Yes     Alcohol/week: 7.0 standard drinks     Types: 7 Glasses of wine per week    Drug use: Yes     Types: Benzodiazepines, Marijuana     Family History   Problem Relation Age of Onset    Cancer Maternal Grandmother     Cancer Paternal Grandmother     Clotting disorder Neg Hx     Diabetes Neg Hx     Dislocations Neg Hx     Osteoporosis Neg Hx     Psoriasis Neg Hx     Rashes / Skin problems Neg Hx     Severe sprains Neg Hx     Ulcerative colitis Neg Hx     Celiac disease Neg Hx     Cirrhosis Neg Hx     Colon cancer Neg Hx     Colon polyps Neg Hx     Crohn's disease Neg Hx     Cystic fibrosis Neg Hx     Esophageal cancer Neg Hx     Hemochromatosis Neg Hx     Inflammatory bowel disease Neg Hx     Irritable bowel syndrome Neg Hx     Liver cancer Neg Hx     Liver disease Neg  Hx     Stomach cancer Neg Hx     Rectal cancer Neg Hx     Lymphoma Neg Hx     Fausto's disease Neg Hx     Tuberculosis Neg Hx     Scleroderma Neg Hx     Multiple sclerosis Neg Hx     Melanoma Neg Hx     Lupus Neg Hx     Rheum arthritis Neg Hx      OB History    Para Term  AB Living   0 0 0 0 0 0   SAB IAB Ectopic Multiple Live Births   0 0 0 0 0       Current Outpatient Medications:     albuterol (PROVENTIL) 2.5 mg /3 mL (0.083 %) nebulizer solution, Take 3 mLs (2.5 mg total) by nebulization every 4 (four) hours as needed for Wheezing., Disp: 1 Box, Rfl: 11    bremelanotide (VYLEESI) 1.75 mg/0.3 mL AtIn, Inject 1.75 mg into the skin as needed (Inject SC as needed at least 45 minutes prior to anticipated sexual activity)., Disp: 4 each, Rfl: 5    budesonide (PULMICORT FLEXHALER) 90 mcg/actuation AePB, Inhale 2 puffs (180 mcg total) into the lungs 2 (two) times a day. Controller, Disp: 1 each, Rfl: 11    cyclobenzaprine (FLEXERIL) 10 MG tablet, TAKE 1 TABLET(10 MG) BY MOUTH EVERY NIGHT, Disp: 30 tablet, Rfl: 1    diazePAM (VALIUM) 5 MG tablet, , Disp: , Rfl:     meloxicam (MOBIC) 15 MG tablet, Take 1 tablet (15 mg total) by mouth once daily., Disp: 30 tablet, Rfl: 2    methocarbamoL (ROBAXIN) 500 MG Tab, Take 1 tablet (500 mg total) by mouth 3 (three) times daily as needed (muscle spasms)., Disp: 40 tablet, Rfl: 0    modafiniL (PROVIGIL) 100 MG Tab, , Disp: , Rfl:     pantoprazole (PROTONIX) 40 MG tablet, TAKE 1 TABLET DAILY, Disp: 90 tablet, Rfl: 3    polyethylene glycol (GLYCOLAX) 17 gram/dose powder, MIX AND DRINK 25 GRAMS ONCE DAILY, Disp: 1428 g, Rfl: 5    sulfacetamide sodium-sulfur (SULFACLEANSE 8-4) 8-4 % Susp, Wash face qd - bid, Disp: 1 Bottle, Rfl: 5    ZENZEDI 5 mg tablet, , Disp: , Rfl:     The 10-year ASCVD risk score ([place] [employer] Reyes., et al., 2013) is: 0.1%    Values used to calculate the score:      Age: 41 years      Sex: Female      Is Non- :  "No      Diabetic: No      Tobacco smoker: No      Systolic Blood Pressure: 106 mmHg      Is BP treated: No      HDL Cholesterol: 70 mg/dL      Total Cholesterol: 151 mg/dL    Review of Systems:  General: No fever, chills, or weight loss.  Chest: No chest pain, shortness of breath, or palpitations.  Breast: No pain, masses, or nipple discharge.  Vulva: No pain, lesions, or itching.  Vagina: No relaxation, itching, discharge, or lesions.  Abdomen: No pain, nausea, vomiting, diarrhea, or constipation.  Urinary: No incontinence, nocturia, frequency, or dysuria.  Extremities:  No leg cramps, edema, or calf pain.  Neurologic: No headaches, dizziness, or visual changes.    Objective:     Vitals:    05/18/22 1439   BP: 106/70   Weight: 55.7 kg (122 lb 12.8 oz)   Height: 5' 4" (1.626 m)   PainSc: 0-No pain     Body mass index is 21.08 kg/m².    PHYSICAL EXAM:  APPEARANCE: Well nourished, well developed, in no acute distress.  AFFECT: WNL, alert and oriented x 3  PELVIC: Pt defers     Assessment:      Low libido/Dyspareunia/HSDD: Likely dyspareunia affecting libido. She has tried and failed pelvic floor PT multiple times, dilators, and vaginal valium suppositories. She would like to try work on treating her libido so will do trial of Vyleesi. Discussed hormones and testosterone effecting libido as well. Will check testosterone level today but in the premenopausal woman this level can fluctuate.   -     Estradiol; Future; Expected date: 05/18/2022  -     Testosterone, free; Future; Expected date: 05/18/2022  -     Follicle Stimulating Hormone; Future; Expected date: 05/18/2022  -     Ambulatory referral/consult to [facility]  -     bremelanotide (VYLEESI) 1.75 mg/0.3 mL AtIn; Inject 1.75 mg into the skin as needed (Inject SC as needed at least 45 minutes prior to anticipated sexual activity).  Dispense: 4 each; Refill: 5        Plan:   Baseline hormone levels today.  Trial of Vyleesi to use PRN prior to " intercourse.  Counseled on use. Faxed Rx to speciality pharmacy.  Follow up with Dr. Arellano in 3 months.    Instructed patient to call if she experiences any side effects or has any questions.    I spent a total of 30 minutes on the day of the visit.This includes face to face time and non-face to face time preparing to see the patient (eg, review of tests), obtaining and/or reviewing separately obtained history, documenting clinical information in the electronic or other health record, independently interpreting results and communicating results to the patient/family/caregiver, or care coordinator.

## 2022-05-20 ENCOUNTER — PATIENT MESSAGE (OUTPATIENT)
Dept: SPORTS MEDICINE | Facility: CLINIC | Age: 42
End: 2022-05-20
Payer: COMMERCIAL

## 2022-05-23 ENCOUNTER — OFFICE VISIT (OUTPATIENT)
Dept: SPORTS MEDICINE | Facility: CLINIC | Age: 42
End: 2022-05-23
Payer: COMMERCIAL

## 2022-05-23 ENCOUNTER — HOSPITAL ENCOUNTER (OUTPATIENT)
Dept: RADIOLOGY | Facility: HOSPITAL | Age: 42
Discharge: HOME | End: 2022-05-23
Attending: PHYSICIAN ASSISTANT
Payer: COMMERCIAL

## 2022-05-23 VITALS
BODY MASS INDEX: 21.04 KG/M2 | WEIGHT: 123.25 LBS | DIASTOLIC BLOOD PRESSURE: 72 MMHG | HEART RATE: 82 BPM | HEIGHT: 64 IN | SYSTOLIC BLOOD PRESSURE: 106 MMHG

## 2022-05-23 DIAGNOSIS — S32.591D CLOSED FRACTURE OF RAMUS OF RIGHT PUBIS WITH ROUTINE HEALING, SUBSEQUENT ENCOUNTER: ICD-10-CM

## 2022-05-23 DIAGNOSIS — M25.551 RIGHT HIP PAIN: ICD-10-CM

## 2022-05-23 DIAGNOSIS — M25.551 RIGHT HIP PAIN: Primary | ICD-10-CM

## 2022-05-23 PROCEDURE — 1160F RVW MEDS BY RX/DR IN RCRD: CPT | Mod: CPTII,S$GLB,, | Performed by: PHYSICIAN ASSISTANT

## 2022-05-23 PROCEDURE — 3078F DIAST BP <80 MM HG: CPT | Mod: CPTII,S$GLB,, | Performed by: PHYSICIAN ASSISTANT

## 2022-05-23 PROCEDURE — 99999 PR PBB SHADOW E&M-EST. PATIENT-LVL III: ICD-10-PCS | Mod: PBBFAC,,, | Performed by: PHYSICIAN ASSISTANT

## 2022-05-23 PROCEDURE — 73502 X-RAY EXAM HIP UNI 2-3 VIEWS: CPT | Mod: 26,RT,, | Performed by: RADIOLOGY

## 2022-05-23 PROCEDURE — 1159F PR MEDICATION LIST DOCUMENTED IN MEDICAL RECORD: ICD-10-PCS | Mod: CPTII,S$GLB,, | Performed by: PHYSICIAN ASSISTANT

## 2022-05-23 PROCEDURE — 99999 PR PBB SHADOW E&M-EST. PATIENT-LVL III: CPT | Mod: PBBFAC,,, | Performed by: PHYSICIAN ASSISTANT

## 2022-05-23 PROCEDURE — 3074F PR MOST RECENT SYSTOLIC BLOOD PRESSURE < 130 MM HG: ICD-10-PCS | Mod: CPTII,S$GLB,, | Performed by: PHYSICIAN ASSISTANT

## 2022-05-23 PROCEDURE — 3074F SYST BP LT 130 MM HG: CPT | Mod: CPTII,S$GLB,, | Performed by: PHYSICIAN ASSISTANT

## 2022-05-23 PROCEDURE — 1159F MED LIST DOCD IN RCRD: CPT | Mod: CPTII,S$GLB,, | Performed by: PHYSICIAN ASSISTANT

## 2022-05-23 PROCEDURE — 73502 XR HIP WITH PELVIS WHEN PERFORMED, 2 OR 3  VIEWS RIGHT: ICD-10-PCS | Mod: 26,RT,, | Performed by: RADIOLOGY

## 2022-05-23 PROCEDURE — 1160F PR REVIEW ALL MEDS BY PRESCRIBER/CLIN PHARMACIST DOCUMENTED: ICD-10-PCS | Mod: CPTII,S$GLB,, | Performed by: PHYSICIAN ASSISTANT

## 2022-05-23 PROCEDURE — 99214 OFFICE O/P EST MOD 30 MIN: CPT | Mod: S$GLB,,, | Performed by: PHYSICIAN ASSISTANT

## 2022-05-23 PROCEDURE — 99214 PR OFFICE/OUTPT VISIT, EST, LEVL IV, 30-39 MIN: ICD-10-PCS | Mod: S$GLB,,, | Performed by: PHYSICIAN ASSISTANT

## 2022-05-23 PROCEDURE — 3078F PR MOST RECENT DIASTOLIC BLOOD PRESSURE < 80 MM HG: ICD-10-PCS | Mod: CPTII,S$GLB,, | Performed by: PHYSICIAN ASSISTANT

## 2022-05-23 PROCEDURE — 73502 X-RAY EXAM HIP UNI 2-3 VIEWS: CPT | Mod: TC,PN,RT

## 2022-05-23 PROCEDURE — 3008F BODY MASS INDEX DOCD: CPT | Mod: CPTII,S$GLB,, | Performed by: PHYSICIAN ASSISTANT

## 2022-05-23 PROCEDURE — 3008F PR BODY MASS INDEX (BMI) DOCUMENTED: ICD-10-PCS | Mod: CPTII,S$GLB,, | Performed by: PHYSICIAN ASSISTANT

## 2022-05-23 NOTE — PROGRESS NOTES
Subjective:          Chief Complaint: Cynthia Dailey is a 41 y.o. female who had concerns including Pain of the Right Hip.    Patient patient presents today for follow-up evaluation of right hip pain.  Overall, patient reports significant improvement of pain, activity toleration, and range of motion of the right hip.  She denies any new falls, injuries, or trauma.  She has been able to bike and is no longer experiencing pain after prolonged sitting or prolonged biking.  She has been trying to gradually ease back into running, however is still experiencing pain with this.  She continues formal physical therapy and is progressing well.  She has been taking over-the-counter acetaminophen as needed for pain.      Previous HPI: Cynthia Dailey is a  and a runner (6 days a week, 50-55 miles a week). Rides bike or swim other day of the week. The pain started 3 weeks ago while running, around the end of her 8-mile run. Pain is becoming progressively worse, compensating gait, now pain with walking. Pain is located over (points to) anterior hip, groin. She reports that the pain is a 4 /10 sharp and aching pain today and not responding adequately to conservative measures which have included activity modifications, rest, and oral medication (iburporfen, tylenol). Is affecting ADLs and limiting desired level of activity. Denies numbness, tingling, radiation.  Pain is 9 /10 at its worst    Mechanical symptoms: none  Subjective instability: (+)   Worse with walking, being (hip flexion)  Better with rest.   Nocturnal symptoms: (+)    No previous surgeries or trauma on hips     Previous saw an Orthopedist in [place], 5 years ago for AVN after abnormal CT results. Treated with observation because she was not running then        Review of Systems   Constitutional: Negative for chills and fever.   HENT: Negative for congestion and sore throat.    Eyes: Negative for discharge and double vision.   Cardiovascular: Negative for  chest pain, palpitations and syncope.   Respiratory: Negative for cough and shortness of breath.    Endocrine: Negative for cold intolerance and heat intolerance.   Skin: Negative for dry skin and rash.   Musculoskeletal: Positive for joint pain and muscle weakness. Negative for falls.   Gastrointestinal: Negative for abdominal pain, nausea and vomiting.   Neurological: Negative for focal weakness, numbness and paresthesias.                   Objective:        General: Cynthia is well-developed, well-nourished, appears stated age, in no acute distress, alert and oriented to time, place and person.     General    Constitutional: She is oriented to person, place, and time. She appears well-developed and well-nourished. No distress.   HENT:   Head: Normocephalic and atraumatic.   Nose: Nose normal.   Eyes: Conjunctivae and EOM are normal. Pupils are equal, round, and reactive to light.   Neck: No JVD present.   Cardiovascular: Normal rate, regular rhythm and normal heart sounds.    Pulmonary/Chest: Effort normal and breath sounds normal. No respiratory distress.   Abdominal: Soft. Bowel sounds are normal. She exhibits no distension. There is no abdominal tenderness.   Neurological: She is alert and oriented to person, place, and time. She has normal reflexes. Coordination normal.   Psychiatric: She has a normal mood and affect. Her behavior is normal. Judgment and thought content normal.     General Musculoskeletal Exam   Gait: nonantalgic  Pelvic Obliquity: moderate      Right Knee Exam     Inspection   Effusion: absent    Left Knee Exam     Inspection   Effusion: absent    Right Hip Exam     Inspection   Swelling: absent  Bruising: absent  No deformity of hip.  Erythema: absent    Tenderness   The patient tender to palpation of the pubic symphisis.    Range of Motion   Abduction: 45   Adduction:  30   Extension: 20   Flexion: 120   External rotation: 60   Internal rotation: 30     Tests   Pain w/ forced internal  rotation (ILA): absent  Pain w/ forced external rotation (FADIR): absent  Lizzie: negative  Circumduction test: negative  Log Roll: negative  Step-down test: negative  Resisted sit-up pain with adductor contraction pain: negative  Straight leg raise:  Negative  Bridge test:  Negative    Other   Sensation: normal    Comments:    Left Hip Exam     Inspection   Swelling: absent  No deformity of hip.  Erythema: absent  Bruising: absent    Range of Motion   Abduction: 45   Adduction: 30   Extension: 20   Flexion: 120   External rotation: 60   Internal rotation: 30     Tests   Pain w/ forced internal rotation (ILA): absent  Pain w/ forced external rotation (FADIR): absent  Stinchfield test: negative  Log Roll: negative  Step-down test: negative  Lizzie:  negative    Other   Sensation: normal          Muscle Strength   Right Lower Extremity   Hip Abduction: 5/5   Hip Adduction: 5/5   Hip Flexion: 5/5   Ankle Dorsiflexion:  5/5   Left Lower Extremity   Hip Abduction: 5/5   Hip Adduction: 5/5   Hip Flexion: 5/5   Ankle Dorsiflexion:  5/5     Radiographic Findings:    Abnormal subchondral lucency/sclerosis is observed involving both femoral heads, consistent with bilateral avascular necrosis.  This appearance is similar to the prior CT examination of 06/20/2017, and both femoral heads retain normal contour, with no significant flattening seen on either side.  Hip joint spaces are normally maintained on both sides as well, without significant narrowing.  Remaining osseous structures are unremarkable, with no evidence of recent or healing fracture or lytic destructive process observed.  SI joints appear unremarkable.     Impression:     Abnormal examination, with conventional radiographic findings strongly suggesting bilateral avascular necrosis of the femoral heads.  However, the appearance of the femoral heads is quite similar to the CT examination of 06/20/2017, with no interval femoral head flattening or narrowing of either  hip joint space observed.    Xrays of the bilateral hips and pelvis were reviewed by me today. These findings were discussed and reviewed with the patient.    Reviewed previous CT abdomen    MRI PELVIS WITHOUT CONTRAST     CLINICAL HISTORY:  Hip pain, chronic, tendon/ligament abnormality suspected, xray done;adductor pathology and assess AVN;  Strain of muscle, fascia and tendon of pelvis, initial encounter     TECHNIQUE:  Multiplanar, multisequence imaging of the pelvis and   hip without the use of contrast     COMPARISON:  06/20/2017     FINDINGS:  Osseous Structures: AVN both femoral heads without evidence for subchondral fracture or flattening.  This is been noted on CT examination of 06/20/2017.No marrow signal abnormality to suggest bone marrow replacement process.  There is evidence for fracture at the level of the RIGHT inferior pubic ramus with adjacent bone marrow edema best identified series 3, image 33, series 7, image 1 series 4, image 20.  Additionally, there is focal hyperintensity identified at the level of the superior pubic ramus/acetabular junction on axial series 3, image 26 coronal image 23.  This is also consistent with occult fracture.     Hip and Sacroiliac joints: No evidence of joint effusion .No gross abnormalities of the acetabular roscoe are shown.  MR arthrogram would be necessary for complete evaluation of the roscoe, if clinically indicated.     Bursae: No trochanteric or iliopsoas bursitis.     Soft Tissues: Muscles and tendons of the pelvis and both hips show no atrophy, edema, mass, tears or other abnormalities.     Misc: No abnormal soft tissue masses apart from large LEFT pelvic cyst, measuring 3.3 x 5.1 cm, likely ovarian in etiology for which pelvic ultrasound could further evaluate.     Impression:     Fracture at the level of the RIGHT inferior pubic ramus and superior pubic ramus-acetabular junction, nondisplaced.     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     LEFT pelvic cyst 3.3 x 5.1 cm likely ovarian in etiology.  Although this may be physiologic, pelvic ultrasound recommended for further evaluation.    X-rays right hip (5/23/2022):    FINDINGS:  Hip two views right comparison is 03/21/2022.     Again seen are changes of bilateral AVN the superior femoral heads.  There is a healing fracture of the right inferior pubic ramus.  Bones show good alignment no complication.        Assessment:       Encounter Diagnoses   Name Primary?    Right hip pain Yes    Closed fracture of ramus of right pubis with routine healing, subsequent encounter           Plan:       MRI right hip and x-ray results reviewed today. We have discussed a variety of treatment options including medications, injections, physical therapy and other alternative treatments. Recommend continued conservative fracture care management.  Recommend continued physical therapy and activity modification.  She should gradually ease into high impact activities such as running jumping.    I made the decision to obtain old records of the patient including previous notes and imaging. I independently reviewed and interpreted lab results today as well as prior imaging.     1. Continue formal physical therapy at [facility] with Bart Castañeda DPT  2. Ice compress to the affected area 2-3x a day for 15-20 minutes as needed for pain management.  3. May continue biking and slow return to high impact exercise as tolerated.  Emphasized the importance of gradual progression.  4. Continue Robaxin 500mg TID PRN  5. RTC in 6 weeks for follow-up.    All of the patient's questions were answered and the patient will contact us if they have any questions or concerns in the interim.      Medical Dictation software was used during the dictation of portions or the entirety of this medical record.  Phonetic or grammatic errors may exist due to the use of this software.  For clarification, refer to the author of the document.

## 2022-06-04 LAB — TESTOST FREE SERPL DL<=1.0 NG/DL-MCNC: 4.2 PG/ML (ref 1.3–9.2)

## 2022-06-06 NOTE — TELEPHONE ENCOUNTER
Attempting to reach out to Cynthia to discuss neurology consult.  No answer, message left for pt to return call to our office.    Next appt 1-18-23  Last appt 1-18-22    Refill request for/Last refilled info;  losartan (COZAAR) 25 MG tablet 90 tablet 1 5/24/2021 11/20/2021    Sig - Route:  Take 1 tablet by mouth daily. - Oral      Refill completed per standing protocol     Thank you, Refill Center Staff

## 2022-06-07 ENCOUNTER — HOSPITAL ENCOUNTER (OUTPATIENT)
Dept: RADIOLOGY | Facility: OTHER | Age: 42
Discharge: HOME | End: 2022-06-07
Attending: OBSTETRICS & GYNECOLOGY
Payer: COMMERCIAL

## 2022-06-07 DIAGNOSIS — N83.202 LEFT OVARIAN CYST: ICD-10-CM

## 2022-06-07 DIAGNOSIS — Z12.31 ENCOUNTER FOR SCREENING MAMMOGRAM FOR MALIGNANT NEOPLASM OF BREAST: ICD-10-CM

## 2022-06-07 PROCEDURE — 76830 US PELVIS COMP WITH TRANSVAG NON-OB (XPD): ICD-10-PCS | Mod: 26,,, | Performed by: RADIOLOGY

## 2022-06-07 PROCEDURE — 77067 SCR MAMMO BI INCL CAD: CPT | Mod: TC

## 2022-06-07 PROCEDURE — 76856 US PELVIS COMP WITH TRANSVAG NON-OB (XPD): ICD-10-PCS | Mod: 26,,, | Performed by: RADIOLOGY

## 2022-06-07 PROCEDURE — 76830 TRANSVAGINAL US NON-OB: CPT | Mod: TC

## 2022-06-07 PROCEDURE — 77063 MAMMO DIGITAL SCREENING BILAT WITH TOMO: ICD-10-PCS | Mod: 26,,, | Performed by: RADIOLOGY

## 2022-06-07 PROCEDURE — 77063 BREAST TOMOSYNTHESIS BI: CPT | Mod: 26,,, | Performed by: RADIOLOGY

## 2022-06-07 PROCEDURE — 76830 TRANSVAGINAL US NON-OB: CPT | Mod: 26,,, | Performed by: RADIOLOGY

## 2022-06-07 PROCEDURE — 77067 MAMMO DIGITAL SCREENING BILAT WITH TOMO: ICD-10-PCS | Mod: 26,,, | Performed by: RADIOLOGY

## 2022-06-07 PROCEDURE — 77063 BREAST TOMOSYNTHESIS BI: CPT | Mod: TC

## 2022-06-07 PROCEDURE — 77067 SCR MAMMO BI INCL CAD: CPT | Mod: 26,,, | Performed by: RADIOLOGY

## 2022-06-07 PROCEDURE — 76856 US EXAM PELVIC COMPLETE: CPT | Mod: 26,,, | Performed by: RADIOLOGY

## 2022-06-09 NOTE — PROGRESS NOTES
Hi Ms Dailey  I just wanted you to know that your mammogram was overall normal.  Thanks,  Jacob Patel MD

## 2022-06-27 ENCOUNTER — PATIENT MESSAGE (OUTPATIENT)
Dept: INTERNAL MEDICINE | Facility: CLINIC | Age: 42
End: 2022-06-27
Payer: COMMERCIAL

## 2022-06-27 DIAGNOSIS — U07.1 COVID: ICD-10-CM

## 2022-06-27 DIAGNOSIS — U07.1 COVID: Primary | ICD-10-CM

## 2022-06-27 RX ORDER — BENZONATATE 200 MG/1
200 CAPSULE ORAL 3 TIMES DAILY PRN
Qty: 30 CAPSULE | Refills: 0 | Status: SHIPPED | OUTPATIENT
Start: 2022-06-27 | End: 2022-07-07

## 2022-06-27 RX ORDER — CLONAZEPAM 0.5 MG/1
0.5 TABLET ORAL DAILY PRN
COMMUNITY
Start: 2022-06-06 | End: 2024-02-20

## 2022-06-27 NOTE — TELEPHONE ENCOUNTER
Pt states she tested positive for COVID last night      Pt states that she is a higher risk for serious illness     Pt would like to know if you recommend an antiviral therapy?      Pt states she is feeling like she has a bad sinus infection and the onset of bronchitis due to the fact that she  cant breath deeply      Pt states that she has a  Nebulizer and would  like to know if  a breathing treatment help?      LOV:  08/24/21

## 2022-07-22 ENCOUNTER — TELEPHONE (OUTPATIENT)
Dept: OBSTETRICS AND GYNECOLOGY | Facility: CLINIC | Age: 42
End: 2022-07-22
Payer: COMMERCIAL

## 2022-07-22 NOTE — TELEPHONE ENCOUNTER
----- Message from Kacy Barney MA sent at 5/18/2022  2:59 PM CDT -----  Needs f/u in August for sexual dysfunction

## 2022-11-03 RX ORDER — POLYETHYLENE GLYCOL 3350 17 G/17G
POWDER, FOR SOLUTION ORAL
Qty: 1428 G | Refills: 5 | Status: SHIPPED | OUTPATIENT
Start: 2022-11-03 | End: 2023-08-17

## 2022-11-14 ENCOUNTER — TELEPHONE (OUTPATIENT)
Dept: SPORTS MEDICINE | Facility: CLINIC | Age: 42
End: 2022-11-14
Payer: COMMERCIAL

## 2022-11-14 NOTE — TELEPHONE ENCOUNTER
LVM to let patient know appointment is scheduled for 22 at 8:00 am.    FW: Appointment Request   Eleni Lewis MA   Sent:  11:29 AM   To: [facility] Staff      Cynthia Dailey   MRN: [id number] : 1980   Pt Home: [phone]     Entered: [phone]        Message       ----- Message -----   From: Cynthia Dailey   Sent: 2022  11:27 AM CST   To: [facility] Clinical Support Staff   Subject: Appointment Request                                Appointment Request From: Cynthia Dailey      With Provider: Hunter Hernandez PA-C [[facility]]      Preferred Date Range: 2022 - 2022      Preferred Times: Any Time      Reason for visit: pain in left glute/hamstring      Comments:   pain in left glute/hamstring

## 2022-11-15 ENCOUNTER — HOSPITAL ENCOUNTER (OUTPATIENT)
Dept: RADIOLOGY | Facility: HOSPITAL | Age: 42
Discharge: HOME | End: 2022-11-15
Attending: PHYSICIAN ASSISTANT
Payer: COMMERCIAL

## 2022-11-15 ENCOUNTER — OFFICE VISIT (OUTPATIENT)
Dept: SPORTS MEDICINE | Facility: CLINIC | Age: 42
End: 2022-11-15
Payer: COMMERCIAL

## 2022-11-15 VITALS
WEIGHT: 123 LBS | BODY MASS INDEX: 21 KG/M2 | SYSTOLIC BLOOD PRESSURE: 116 MMHG | HEIGHT: 64 IN | DIASTOLIC BLOOD PRESSURE: 72 MMHG

## 2022-11-15 DIAGNOSIS — M25.552 LEFT HIP PAIN: Primary | ICD-10-CM

## 2022-11-15 DIAGNOSIS — M25.552 LEFT HIP PAIN: ICD-10-CM

## 2022-11-15 DIAGNOSIS — M76.899 HAMSTRING TENDINITIS AT ORIGIN: ICD-10-CM

## 2022-11-15 PROCEDURE — 1160F PR REVIEW ALL MEDS BY PRESCRIBER/CLIN PHARMACIST DOCUMENTED: ICD-10-PCS | Mod: CPTII,S$GLB,, | Performed by: PHYSICIAN ASSISTANT

## 2022-11-15 PROCEDURE — 73502 X-RAY EXAM HIP UNI 2-3 VIEWS: CPT | Mod: TC,LT

## 2022-11-15 PROCEDURE — 99999 PR PBB SHADOW E&M-EST. PATIENT-LVL IV: CPT | Mod: PBBFAC,,, | Performed by: PHYSICIAN ASSISTANT

## 2022-11-15 PROCEDURE — 3008F PR BODY MASS INDEX (BMI) DOCUMENTED: ICD-10-PCS | Mod: CPTII,S$GLB,, | Performed by: PHYSICIAN ASSISTANT

## 2022-11-15 PROCEDURE — 1160F RVW MEDS BY RX/DR IN RCRD: CPT | Mod: CPTII,S$GLB,, | Performed by: PHYSICIAN ASSISTANT

## 2022-11-15 PROCEDURE — 73502 XR HIP WITH PELVIS WHEN PERFORMED, 2 OR 3 VIEWS LEFT: ICD-10-PCS | Mod: 26,LT,, | Performed by: RADIOLOGY

## 2022-11-15 PROCEDURE — 1159F PR MEDICATION LIST DOCUMENTED IN MEDICAL RECORD: ICD-10-PCS | Mod: CPTII,S$GLB,, | Performed by: PHYSICIAN ASSISTANT

## 2022-11-15 PROCEDURE — 99214 OFFICE O/P EST MOD 30 MIN: CPT | Mod: S$GLB,,, | Performed by: PHYSICIAN ASSISTANT

## 2022-11-15 PROCEDURE — 99214 PR OFFICE/OUTPT VISIT, EST, LEVL IV, 30-39 MIN: ICD-10-PCS | Mod: S$GLB,,, | Performed by: PHYSICIAN ASSISTANT

## 2022-11-15 PROCEDURE — 3078F DIAST BP <80 MM HG: CPT | Mod: CPTII,S$GLB,, | Performed by: PHYSICIAN ASSISTANT

## 2022-11-15 PROCEDURE — 73502 X-RAY EXAM HIP UNI 2-3 VIEWS: CPT | Mod: 26,LT,, | Performed by: RADIOLOGY

## 2022-11-15 PROCEDURE — 1159F MED LIST DOCD IN RCRD: CPT | Mod: CPTII,S$GLB,, | Performed by: PHYSICIAN ASSISTANT

## 2022-11-15 PROCEDURE — 3074F PR MOST RECENT SYSTOLIC BLOOD PRESSURE < 130 MM HG: ICD-10-PCS | Mod: CPTII,S$GLB,, | Performed by: PHYSICIAN ASSISTANT

## 2022-11-15 PROCEDURE — 3074F SYST BP LT 130 MM HG: CPT | Mod: CPTII,S$GLB,, | Performed by: PHYSICIAN ASSISTANT

## 2022-11-15 PROCEDURE — 3008F BODY MASS INDEX DOCD: CPT | Mod: CPTII,S$GLB,, | Performed by: PHYSICIAN ASSISTANT

## 2022-11-15 PROCEDURE — 3078F PR MOST RECENT DIASTOLIC BLOOD PRESSURE < 80 MM HG: ICD-10-PCS | Mod: CPTII,S$GLB,, | Performed by: PHYSICIAN ASSISTANT

## 2022-11-15 PROCEDURE — 99999 PR PBB SHADOW E&M-EST. PATIENT-LVL IV: ICD-10-PCS | Mod: PBBFAC,,, | Performed by: PHYSICIAN ASSISTANT

## 2022-11-15 NOTE — PROGRESS NOTES
Subjective:       Chief Complaint: Cynthia Dailey is a 42 y.o. female who had concerns including Pain of the Left Hip.    Patient is a 42 y.o. female who presents today for evaluation of left glute/hamstring pain. History of right pubic ramus fracture this past spring and chronic bilateral femoral head AVN.  Patient reports that she began experiencing left posterior thigh/glute pain while running on or about November 2nd.  Pain seems to be localized to the proximal hamstring/glutes and will occasionally radiate down the back of the thigh.  Pain is worse with squatting, prolonged sitting, running, and riding her bicycle.  She initially rested from running for several days which did lead to some improvement, however pain seemed to return with strenuous exercise/activity.  She denies feeling or hearing a pop with an injury or any bruising around the time her pain began.  She continues to stay very active and runs 40-45 miles a week and cycles about 70 miles a week.  She has been resting, stretching, and taking over-the-counter medications for pain.      Previous HPI: Cynthia Dailey is a  and a runner (6 days a week, 50-55 miles a week). Rides bike or swim other day of the week. The pain started 3 weeks ago while running, around the end of her 8-mile run. Pain is becoming progressively worse, compensating gait, now pain with walking. Pain is located over (points to) anterior hip, groin. She reports that the pain is a 4 /10 sharp and aching pain today and not responding adequately to conservative measures which have included activity modifications, rest, and oral medication (iburporfen, tylenol). Is affecting ADLs and limiting desired level of activity. Denies numbness, tingling, radiation.  Pain is 9 /10 at its worst    Mechanical symptoms: none  Subjective instability: (+)   Worse with walking, being (hip flexion)  Better with rest.   Nocturnal symptoms: (+)    No previous surgeries or trauma on  hips     Previous saw an Orthopedist in [place], 5 years ago for AVN after abnormal CT results. Treated with observation because she was not running then        Review of Systems   Constitutional: Negative for chills and fever.   HENT: Negative for congestion and sore throat.    Eyes: Negative for discharge and double vision.   Cardiovascular: Negative for chest pain, palpitations and syncope.   Respiratory: Negative for cough and shortness of breath.    Endocrine: Negative for cold intolerance and heat intolerance.   Skin: Negative for dry skin and rash.   Musculoskeletal: Positive for joint pain and muscle weakness. Negative for falls.   Gastrointestinal: Negative for abdominal pain, nausea and vomiting.   Neurological: Negative for focal weakness, numbness and paresthesias.       Pain Related Questions  Over the past 3 days, what was your average pain during activity? (I.e. running, jogging, walking, climbing stairs, getting dressed, ect.): 3  Over the past 3 days, what was your highest pain level?: 8  Over the past 3 days, what was your lowest pain level? : 3    Other  How many nights a week are you awakened by your affected body part?: 4  Was the patient's HEIGHT measured or patient reported?: Patient Reported  Was the patient's WEIGHT measured or patient reported?: Measured      Objective:        General: Cynthia is well-developed, well-nourished, appears stated age, in no acute distress, alert and oriented to time, place and person.     General    Constitutional: She is oriented to person, place, and time. She appears well-developed and well-nourished. No distress.   HENT:   Head: Normocephalic and atraumatic.   Nose: Nose normal.   Eyes: Conjunctivae and EOM are normal. Pupils are equal, round, and reactive to light.   Neck: No JVD present.   Cardiovascular: Normal rate, regular rhythm and normal heart sounds.    Pulmonary/Chest: Effort normal and breath sounds normal. No respiratory distress.   Abdominal:  Soft. Bowel sounds are normal. She exhibits no distension. There is no abdominal tenderness.   Neurological: She is alert and oriented to person, place, and time. She has normal reflexes. Coordination normal.   Psychiatric: She has a normal mood and affect. Her behavior is normal. Judgment and thought content normal.     General Musculoskeletal Exam   Gait: nonantalgic  Pelvic Obliquity: moderate      Right Knee Exam     Inspection   Effusion: absent    Left Knee Exam     Inspection   Effusion: absent    Right Hip Exam     Inspection   Swelling: absent  Bruising: absent  No deformity of hip.  Erythema: absent    Tenderness   The patient is not tender to palpation of the right hip    Range of Motion   Abduction: 45   Adduction:  30   Extension: 20   Flexion: 120   External rotation: 60   Internal rotation: 30     Tests   Pain w/ forced internal rotation (ILA): absent  Pain w/ forced external rotation (FADIR): absent  Lizzie: negative  Circumduction test: negative  Log Roll: negative  Step-down test: negative  Resisted sit-up pain with adductor contraction pain: negative  Straight leg raise:  Negative  Bridge test:  Negative    Other   Sensation: normal      Left Hip Exam     Inspection   Swelling: absent  No deformity of hip.  Erythema: absent  Bruising: absent    Tenderness  Patient is TTP over the ischial tuberosity, adductor insertion, and proximal hamstring    Range of Motion   Abduction: 45   Adduction: 30   Extension: 20   Flexion: 120*   External rotation: 60   Internal rotation: 30*     Tests   Pain w/ forced internal rotation (ILA): present  Pain w/ forced external rotation (FADIR): absent  Stinchfield test: negative  Log Roll: negative  Step-down test: positive  Lizzie:  negative  Bridge: Negative  Straight leg raise: negative  Resisted sit-up pain with adductor contraction pain: negative    Other   Sensation: normal      Muscle Strength   Right Lower Extremity   Hip Abduction: 5/5   Hip Adduction: 5/5    Hip Flexion: 5/5   Ankle Dorsiflexion:  5/5   Left Lower Extremity   Hip Abduction: 5/5   Hip Adduction: 5/5   Hip Flexion: 5/5  Hip extension: 5/5*   Ankle Dorsiflexion:  5/5     Radiographic Findings:    Abnormal subchondral lucency/sclerosis is observed involving both femoral heads, consistent with bilateral avascular necrosis.  This appearance is similar to the prior CT examination of 06/20/2017, and both femoral heads retain normal contour, with no significant flattening seen on either side.  Hip joint spaces are normally maintained on both sides as well, without significant narrowing.  Remaining osseous structures are unremarkable, with no evidence of recent or healing fracture or lytic destructive process observed.  SI joints appear unremarkable.     Impression:     Abnormal examination, with conventional radiographic findings strongly suggesting bilateral avascular necrosis of the femoral heads.  However, the appearance of the femoral heads is quite similar to the CT examination of 06/20/2017, with no interval femoral head flattening or narrowing of either hip joint space observed.    Xrays of the bilateral hips and pelvis were reviewed by me today. These findings were discussed and reviewed with the patient.    Reviewed previous CT abdomen    MRI PELVIS WITHOUT CONTRAST     CLINICAL HISTORY:  Hip pain, chronic, tendon/ligament abnormality suspected, xray done;adductor pathology and assess AVN;  Strain of muscle, fascia and tendon of pelvis, initial encounter     TECHNIQUE:  Multiplanar, multisequence imaging of the pelvis and   hip without the use of contrast     COMPARISON:  06/20/2017     FINDINGS:  Osseous Structures: AVN both femoral heads without evidence for subchondral fracture or flattening.  This is been noted on CT examination of 06/20/2017.No marrow signal abnormality to suggest bone marrow replacement process.  There is evidence for fracture at the level of the RIGHT inferior pubic ramus with  adjacent bone marrow edema best identified series 3, image 33, series 7, image 1 series 4, image 20.  Additionally, there is focal hyperintensity identified at the level of the superior pubic ramus/acetabular junction on axial series 3, image 26 coronal image 23.  This is also consistent with occult fracture.     Hip and Sacroiliac joints: No evidence of joint effusion .No gross abnormalities of the acetabular roscoe are shown.  MR arthrogram would be necessary for complete evaluation of the roscoe, if clinically indicated.     Bursae: No trochanteric or iliopsoas bursitis.     Soft Tissues: Muscles and tendons of the pelvis and both hips show no atrophy, edema, mass, tears or other abnormalities.     Misc: No abnormal soft tissue masses apart from large LEFT pelvic cyst, measuring 3.3 x 5.1 cm, likely ovarian in etiology for which pelvic ultrasound could further evaluate.     Impression:     Fracture at the level of the RIGHT inferior pubic ramus and superior pubic ramus-acetabular junction, nondisplaced.     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     LEFT pelvic cyst 3.3 x 5.1 cm likely ovarian in etiology.  Although this may be physiologic, pelvic ultrasound recommended for further evaluation.    X-rays right hip (5/23/2022):    FINDINGS:  Hip two views right comparison is 03/21/2022.     Again seen are changes of bilateral AVN the superior femoral heads.  There is a healing fracture of the right inferior pubic ramus.  Bones show good alignment no complication.    X-rays left hip (11/15/2022):     FINDINGS:  Hip joints are preserved bony structures are intact.  Old fracture of the inferior pubic ramus on the right can be seen.  Small amount of some mild of soft tissue calcification is seen adjacent to the greater trochanter on the left        Assessment:       Encounter Diagnoses   Name Primary?    Left hip pain Yes    Hamstring  tendinitis at origin             Plan:         I made the decision to obtain old records of the patient including previous notes and imaging. New imaging was ordered today of the extremity or extremities evaluated. I independently reviewed and interpreted the radiographs and/or MRIs today as well as prior imaging, if available.    We discussed at length different conservative management options. These include anti-inflammatories, acetaminophen, rest, ice, heat, formal physical therapy including strengthening and stretching exercises, home exercise programs, dry needling, and injection therapy.  Also discussed biologic injections as an alternative options.    Recommend she rest and modify activities for the next couple of weeks before returning to her normal exercise routine.  Continue dynamic stretching routine and home exercise program for hip/core strengthening.    Advised her to take over-the-counter anti-inflammatories/acetaminophen as needed for pain.    She will reach out to us if she would like to proceed with formal physical therapy, and she would like to follow up as needed.      All of the patient's questions were answered and the patient will contact us if they have any questions or concerns in the interim.      Medical Dictation software was used during the dictation of portions or the entirety of this medical record.  Phonetic or grammatic errors may exist due to the use of this software. For clarification, refer to the author of the document.

## 2022-12-23 NOTE — TELEPHONE ENCOUNTER
Incoming refill request for: Alprazolam  Per PDMP last dispensed on: 11/21/22  Last seen by: Abelardo Gordon M.D. on:6/17/22  Future appointment to see PCP on: no future apt scheduled at this time    PDMP review:    Criteria met. Forwarded to Physician/SAVANNA for signature.   Referral and records faxed to Dr. Keene's office on 1/31/18.     Spoke to Cynthia, update given.  She has Dr. Keene's contact information from prior conversation and will reach out if she has any trouble/concerns.

## 2023-01-05 ENCOUNTER — OFFICE VISIT (OUTPATIENT)
Dept: INTERNAL MEDICINE | Facility: CLINIC | Age: 43
End: 2023-01-05
Payer: COMMERCIAL

## 2023-01-05 VITALS
BODY MASS INDEX: 20.82 KG/M2 | HEART RATE: 78 BPM | SYSTOLIC BLOOD PRESSURE: 112 MMHG | WEIGHT: 121.94 LBS | RESPIRATION RATE: 20 BRPM | TEMPERATURE: 97 F | HEIGHT: 64 IN | DIASTOLIC BLOOD PRESSURE: 70 MMHG | OXYGEN SATURATION: 99 %

## 2023-01-05 DIAGNOSIS — F41.9 ANXIETY: ICD-10-CM

## 2023-01-05 DIAGNOSIS — F98.8 ATTENTION DEFICIT DISORDER, UNSPECIFIED HYPERACTIVITY PRESENCE: ICD-10-CM

## 2023-01-05 DIAGNOSIS — Z00.00 WELL ADULT EXAM: Primary | ICD-10-CM

## 2023-01-05 DIAGNOSIS — S03.00XS DISLOCATION OF TEMPOROMANDIBULAR JOINT, SEQUELA: ICD-10-CM

## 2023-01-05 DIAGNOSIS — K21.9 GASTROESOPHAGEAL REFLUX DISEASE, UNSPECIFIED WHETHER ESOPHAGITIS PRESENT: ICD-10-CM

## 2023-01-05 PROCEDURE — 3074F PR MOST RECENT SYSTOLIC BLOOD PRESSURE < 130 MM HG: ICD-10-PCS | Mod: CPTII,S$GLB,, | Performed by: FAMILY MEDICINE

## 2023-01-05 PROCEDURE — 3078F PR MOST RECENT DIASTOLIC BLOOD PRESSURE < 80 MM HG: ICD-10-PCS | Mod: CPTII,S$GLB,, | Performed by: FAMILY MEDICINE

## 2023-01-05 PROCEDURE — 99396 PREV VISIT EST AGE 40-64: CPT | Mod: S$GLB,,, | Performed by: FAMILY MEDICINE

## 2023-01-05 PROCEDURE — 1160F RVW MEDS BY RX/DR IN RCRD: CPT | Mod: CPTII,S$GLB,, | Performed by: FAMILY MEDICINE

## 2023-01-05 PROCEDURE — 3008F BODY MASS INDEX DOCD: CPT | Mod: CPTII,S$GLB,, | Performed by: FAMILY MEDICINE

## 2023-01-05 PROCEDURE — 99999 PR PBB SHADOW E&M-EST. PATIENT-LVL IV: CPT | Mod: PBBFAC,,, | Performed by: FAMILY MEDICINE

## 2023-01-05 PROCEDURE — 3074F SYST BP LT 130 MM HG: CPT | Mod: CPTII,S$GLB,, | Performed by: FAMILY MEDICINE

## 2023-01-05 PROCEDURE — 3008F PR BODY MASS INDEX (BMI) DOCUMENTED: ICD-10-PCS | Mod: CPTII,S$GLB,, | Performed by: FAMILY MEDICINE

## 2023-01-05 PROCEDURE — 99396 PR PREVENTIVE VISIT,EST,40-64: ICD-10-PCS | Mod: S$GLB,,, | Performed by: FAMILY MEDICINE

## 2023-01-05 PROCEDURE — 3078F DIAST BP <80 MM HG: CPT | Mod: CPTII,S$GLB,, | Performed by: FAMILY MEDICINE

## 2023-01-05 PROCEDURE — 1160F PR REVIEW ALL MEDS BY PRESCRIBER/CLIN PHARMACIST DOCUMENTED: ICD-10-PCS | Mod: CPTII,S$GLB,, | Performed by: FAMILY MEDICINE

## 2023-01-05 PROCEDURE — 1159F PR MEDICATION LIST DOCUMENTED IN MEDICAL RECORD: ICD-10-PCS | Mod: CPTII,S$GLB,, | Performed by: FAMILY MEDICINE

## 2023-01-05 PROCEDURE — 1159F MED LIST DOCD IN RCRD: CPT | Mod: CPTII,S$GLB,, | Performed by: FAMILY MEDICINE

## 2023-01-05 PROCEDURE — 99999 PR PBB SHADOW E&M-EST. PATIENT-LVL IV: ICD-10-PCS | Mod: PBBFAC,,, | Performed by: FAMILY MEDICINE

## 2023-01-05 RX ORDER — PANTOPRAZOLE SODIUM 40 MG/1
40 TABLET, DELAYED RELEASE ORAL DAILY
Qty: 90 TABLET | Refills: 3 | Status: SHIPPED | OUTPATIENT
Start: 2023-01-05 | End: 2023-12-13

## 2023-01-05 NOTE — PROGRESS NOTES
Subjective:       Patient ID: Cynthia Dailey is a 42 y.o. female.    Chief Complaint: Annual Exam    HPI 42-year-old white female presents to clinic today for annual physical exam.  She continues to be followed by Psychiatry for treatment of anxiety, depression, and ADHD which remains stable.  He continues to use Flexeril as needed for treatment of TMJ and tension headaches.  GERD remains well controlled with use of pantoprazole daily.  She has a past surgical history of cholecystectomy, rectal prolapse repair, and mandible fracture repair.  She reports no significant family medical history.  Review of Systems   Constitutional:  Negative for appetite change, chills, fatigue and fever.   HENT:  Negative for nasal congestion, ear pain, hearing loss, postnasal drip, rhinorrhea, sinus pressure/congestion, sore throat and tinnitus.    Eyes:  Negative for redness, itching and visual disturbance.   Respiratory:  Negative for cough, chest tightness and shortness of breath.    Cardiovascular:  Negative for chest pain and palpitations.   Gastrointestinal:  Negative for abdominal pain, constipation, diarrhea, nausea and vomiting.   Genitourinary:  Negative for decreased urine volume, difficulty urinating, dysuria, frequency, hematuria and urgency.   Musculoskeletal:  Negative for back pain, myalgias, neck pain and neck stiffness.   Integumentary:  Negative for rash.   Neurological:  Negative for dizziness, light-headedness and headaches.   Psychiatric/Behavioral: Negative.         Objective:      Physical Exam  Vitals and nursing note reviewed.   Constitutional:       General: She is not in acute distress.     Appearance: She is well-developed. She is not diaphoretic.   HENT:      Head: Normocephalic and atraumatic.      Right Ear: External ear normal.      Left Ear: External ear normal.      Nose: Nose normal.      Mouth/Throat:      Pharynx: No oropharyngeal exudate.   Eyes:      General: No scleral icterus.         Right eye: No discharge.         Left eye: No discharge.      Conjunctiva/sclera: Conjunctivae normal.      Pupils: Pupils are equal, round, and reactive to light.   Neck:      Thyroid: No thyromegaly.      Vascular: No JVD.      Trachea: No tracheal deviation.   Cardiovascular:      Rate and Rhythm: Normal rate and regular rhythm.      Heart sounds: Normal heart sounds. No murmur heard.    No friction rub. No gallop.   Pulmonary:      Effort: Pulmonary effort is normal. No respiratory distress.      Breath sounds: Normal breath sounds. No stridor. No wheezing or rales.   Abdominal:      General: Bowel sounds are normal. There is no distension.      Palpations: Abdomen is soft. There is no mass.      Tenderness: There is no abdominal tenderness. There is no guarding or rebound.   Musculoskeletal:         General: No tenderness. Normal range of motion.      Cervical back: Normal range of motion and neck supple.   Lymphadenopathy:      Cervical: No cervical adenopathy.   Skin:     General: Skin is warm and dry.      Coloration: Skin is not pale.      Findings: No erythema or rash.   Neurological:      Mental Status: She is alert and oriented to person, place, and time.   Psychiatric:         Behavior: Behavior normal.         Thought Content: Thought content normal.         Judgment: Judgment normal.       Assessment:       Problem List Items Addressed This Visit       ADD (attention deficit disorder)    Anxiety    GERD (gastroesophageal reflux disease)    Relevant Medications    pantoprazole (PROTONIX) 40 MG tablet    TMJ (dislocation of temporomandibular joint)     Other Visit Diagnoses       Well adult exam    -  Primary    Relevant Orders    CBC Auto Differential    Comprehensive Metabolic Panel    Lipid Panel    T4, Free    TSH    Urinalysis    Hemoglobin A1C            Plan:         1. CBC, CMP, UA, TSH, free T4, fasting lipids, and hemoglobin A1c.    2. Continue pantoprazole 40 mg daily.  GERD is well  controlled.    3. Continue medications as prescribed by Psychiatry and continue follow-up with psychiatry as scheduled.  Anxiety and ADHD are stable.    4. Continue use of Flexeril as needed for TMJ.  5. Return to clinic as needed or in 1 year for annual physical exam.

## 2023-01-11 ENCOUNTER — LAB VISIT (OUTPATIENT)
Dept: LAB | Facility: HOSPITAL | Age: 43
End: 2023-01-11
Attending: FAMILY MEDICINE
Payer: COMMERCIAL

## 2023-01-11 DIAGNOSIS — Z00.00 WELL ADULT EXAM: ICD-10-CM

## 2023-01-11 LAB
ALBUMIN SERPL BCP-MCNC: 4.4 G/DL (ref 3.5–5.2)
ALP SERPL-CCNC: 62 U/L (ref 55–135)
ALT SERPL W/O P-5'-P-CCNC: 24 U/L (ref 10–44)
ANION GAP SERPL CALC-SCNC: 10 MMOL/L (ref 8–16)
AST SERPL-CCNC: 30 U/L (ref 10–40)
BASOPHILS # BLD AUTO: 0.04 K/UL (ref 0–0.2)
BASOPHILS NFR BLD: 0.9 % (ref 0–1.9)
BILIRUB SERPL-MCNC: 0.7 MG/DL (ref 0.1–1)
BUN SERPL-MCNC: 20 MG/DL (ref 6–20)
CALCIUM SERPL-MCNC: 9.6 MG/DL (ref 8.7–10.5)
CHLORIDE SERPL-SCNC: 106 MMOL/L (ref 95–110)
CHOLEST SERPL-MCNC: 170 MG/DL (ref 120–199)
CHOLEST/HDLC SERPL: 2.2 {RATIO} (ref 2–5)
CO2 SERPL-SCNC: 24 MMOL/L (ref 23–29)
CREAT SERPL-MCNC: 0.7 MG/DL (ref 0.5–1.4)
DIFFERENTIAL METHOD: ABNORMAL
EOSINOPHIL # BLD AUTO: 0.1 K/UL (ref 0–0.5)
EOSINOPHIL NFR BLD: 1.1 % (ref 0–8)
ERYTHROCYTE [DISTWIDTH] IN BLOOD BY AUTOMATED COUNT: 14.1 % (ref 11.5–14.5)
EST. GFR  (NO RACE VARIABLE): >60 ML/MIN/1.73 M^2
ESTIMATED AVG GLUCOSE: 94 MG/DL (ref 68–131)
GLUCOSE SERPL-MCNC: 74 MG/DL (ref 70–110)
HBA1C MFR BLD: 4.9 % (ref 4–5.6)
HCT VFR BLD AUTO: 37.4 % (ref 37–48.5)
HDLC SERPL-MCNC: 77 MG/DL (ref 40–75)
HDLC SERPL: 45.3 % (ref 20–50)
HGB BLD-MCNC: 11.6 G/DL (ref 12–16)
IMM GRANULOCYTES # BLD AUTO: 0.01 K/UL (ref 0–0.04)
IMM GRANULOCYTES NFR BLD AUTO: 0.2 % (ref 0–0.5)
LDLC SERPL CALC-MCNC: 82.4 MG/DL (ref 63–159)
LYMPHOCYTES # BLD AUTO: 1.5 K/UL (ref 1–4.8)
LYMPHOCYTES NFR BLD: 32.2 % (ref 18–48)
MCH RBC QN AUTO: 31.4 PG (ref 27–31)
MCHC RBC AUTO-ENTMCNC: 31 G/DL (ref 32–36)
MCV RBC AUTO: 101 FL (ref 82–98)
MONOCYTES # BLD AUTO: 0.3 K/UL (ref 0.3–1)
MONOCYTES NFR BLD: 5.9 % (ref 4–15)
NEUTROPHILS # BLD AUTO: 2.7 K/UL (ref 1.8–7.7)
NEUTROPHILS NFR BLD: 59.7 % (ref 38–73)
NONHDLC SERPL-MCNC: 93 MG/DL
NRBC BLD-RTO: 0 /100 WBC
PLATELET # BLD AUTO: 274 K/UL (ref 150–450)
PMV BLD AUTO: 10.3 FL (ref 9.2–12.9)
POTASSIUM SERPL-SCNC: 4.5 MMOL/L (ref 3.5–5.1)
PROT SERPL-MCNC: 6.8 G/DL (ref 6–8.4)
RBC # BLD AUTO: 3.69 M/UL (ref 4–5.4)
SODIUM SERPL-SCNC: 140 MMOL/L (ref 136–145)
T4 FREE SERPL-MCNC: 0.92 NG/DL (ref 0.71–1.51)
TRIGL SERPL-MCNC: 53 MG/DL (ref 30–150)
TSH SERPL DL<=0.005 MIU/L-ACNC: 2.79 UIU/ML (ref 0.4–4)
WBC # BLD AUTO: 4.59 K/UL (ref 3.9–12.7)

## 2023-01-11 PROCEDURE — 80053 COMPREHEN METABOLIC PANEL: CPT | Performed by: FAMILY MEDICINE

## 2023-01-11 PROCEDURE — 83036 HEMOGLOBIN GLYCOSYLATED A1C: CPT | Performed by: FAMILY MEDICINE

## 2023-01-11 PROCEDURE — 84443 ASSAY THYROID STIM HORMONE: CPT | Performed by: FAMILY MEDICINE

## 2023-01-11 PROCEDURE — 36415 COLL VENOUS BLD VENIPUNCTURE: CPT | Mod: PN | Performed by: FAMILY MEDICINE

## 2023-01-11 PROCEDURE — 80061 LIPID PANEL: CPT | Performed by: FAMILY MEDICINE

## 2023-01-11 PROCEDURE — 85025 COMPLETE CBC W/AUTO DIFF WBC: CPT | Performed by: FAMILY MEDICINE

## 2023-01-11 PROCEDURE — 84439 ASSAY OF FREE THYROXINE: CPT | Performed by: FAMILY MEDICINE

## 2023-02-10 ENCOUNTER — OFFICE VISIT (OUTPATIENT)
Dept: PRIMARY CARE CLINIC | Facility: CLINIC | Age: 43
End: 2023-02-10
Payer: COMMERCIAL

## 2023-02-10 DIAGNOSIS — M79.671 ACUTE PAIN OF RIGHT FOOT: ICD-10-CM

## 2023-02-10 DIAGNOSIS — M19.041 ARTHRITIS OF FINGER OF BOTH HANDS: Primary | ICD-10-CM

## 2023-02-10 DIAGNOSIS — M19.042 ARTHRITIS OF FINGER OF BOTH HANDS: Primary | ICD-10-CM

## 2023-02-10 DIAGNOSIS — D75.89 MACROCYTOSIS: ICD-10-CM

## 2023-02-10 PROCEDURE — 99214 OFFICE O/P EST MOD 30 MIN: CPT | Mod: 95,,, | Performed by: FAMILY MEDICINE

## 2023-02-10 PROCEDURE — 1160F PR REVIEW ALL MEDS BY PRESCRIBER/CLIN PHARMACIST DOCUMENTED: ICD-10-PCS | Mod: CPTII,95,, | Performed by: FAMILY MEDICINE

## 2023-02-10 PROCEDURE — 1159F PR MEDICATION LIST DOCUMENTED IN MEDICAL RECORD: ICD-10-PCS | Mod: CPTII,95,, | Performed by: FAMILY MEDICINE

## 2023-02-10 PROCEDURE — 3044F PR MOST RECENT HEMOGLOBIN A1C LEVEL <7.0%: ICD-10-PCS | Mod: CPTII,95,, | Performed by: FAMILY MEDICINE

## 2023-02-10 PROCEDURE — 3044F HG A1C LEVEL LT 7.0%: CPT | Mod: CPTII,95,, | Performed by: FAMILY MEDICINE

## 2023-02-10 PROCEDURE — 1160F RVW MEDS BY RX/DR IN RCRD: CPT | Mod: CPTII,95,, | Performed by: FAMILY MEDICINE

## 2023-02-10 PROCEDURE — 99214 PR OFFICE/OUTPT VISIT, EST, LEVL IV, 30-39 MIN: ICD-10-PCS | Mod: 95,,, | Performed by: FAMILY MEDICINE

## 2023-02-10 PROCEDURE — 1159F MED LIST DOCD IN RCRD: CPT | Mod: CPTII,95,, | Performed by: FAMILY MEDICINE

## 2023-02-10 NOTE — PROGRESS NOTES
Subjective:       Patient ID: Cynthia Dailey is a 42 y.o. female.    Chief Complaint: No chief complaint on file.      42-year-old female seen today for evaluation of pain and swelling in her fingers as well as her right foot.  Says her right foot started to become swollen and tender approximately 4 weeks ago.  Still able to run, but hurts to cycle and also has pain at rest.  No known injury, though she has had previous stress fractures.  Has gotten little to no relief with the use of ice and NSAIDs.  Also reports that about a week later the fingers of both hands became swollen and pruritic, then developed erythema, warmth and tenderness.  She does have a history of Raynaud's, but says that this is very different.  Finger pain and swelling has improved slightly, but still has some discoloration to the dorsal surface of the PIP joints of both hands.  No recent illness or injury.  No change in skin care products.  No known family history of rheumatologic/autoimmune disease.    The patient location is:  [place]  The chief complaint leading to consultation is:  Foot and finger pain    Visit type: audiovisual    Face to Face time with patient:  12 minutes  18 minutes of total time spent on the encounter, which includes face to face time and non-face to face time preparing to see the patient (eg, review of tests), Obtaining and/or reviewing separately obtained history, Documenting clinical information in the electronic or other health record, Independently interpreting results (not separately reported) and communicating results to the patient/family/caregiver, or Care coordination (not separately reported).         Each patient to whom he or she provides medical services by telemedicine is:  (1) informed of the relationship between the physician and patient and the respective role of any other health care provider with respect to management of the patient; and (2) notified that he or she may decline to receive  medical services by telemedicine and may withdraw from such care at any time.    Notes:    Review of Systems   Constitutional:  Negative for chills and fever.   Musculoskeletal:  Positive for arthralgias and joint swelling.   Skin:  Positive for color change. Negative for wound.   Allergic/Immunologic: Negative for immunocompromised state.   Psychiatric/Behavioral:  Negative for agitation and confusion.      Objective:      There were no vitals filed for this visit.  Physical Exam  Constitutional:       General: She is not in acute distress.     Appearance: Normal appearance. She is well-developed.   Pulmonary:      Effort: No respiratory distress.   Musculoskeletal:         General: Swelling present.      Comments: Erythema and swelling to PIP joints of both hands.  Dorsomedial aspect of right foot appears to be swollen   Neurological:      Mental Status: She is alert and oriented to person, place, and time.   Psychiatric:         Behavior: Behavior normal.       Lab Results   Component Value Date    WBC 4.59 01/11/2023    HGB 11.6 (L) 01/11/2023    HCT 37.4 01/11/2023     01/11/2023    CHOL 170 01/11/2023    TRIG 53 01/11/2023    HDL 77 (H) 01/11/2023    ALT 24 01/11/2023    AST 30 01/11/2023     01/11/2023    K 4.5 01/11/2023     01/11/2023    CREATININE 0.7 01/11/2023    BUN 20 01/11/2023    CO2 24 01/11/2023    TSH 2.785 01/11/2023    INR 1.1 10/06/2010    HGBA1C 4.9 01/11/2023                    Assessment:       1. Arthritis of finger of both hands    2. Macrocytosis    3. Acute pain of right foot          Plan:       Arthritis of finger of both hands  -     Sedimentation rate; Future; Expected date: 02/10/2023  -     C-Reactive Protein; Future; Expected date: 02/10/2023  -     CANELO Screen w/Reflex; Future; Expected date: 02/10/2023  -     Rheumatoid Factor; Future; Expected date: 02/10/2023  -     Uric Acid; Future; Expected date: 02/10/2023  -     X-Ray Hand 3 View Bilateral; Future;  Expected date: 02/10/2023    Macrocytosis  -     Vitamin B12; Future; Expected date: 02/10/2023    Acute pain of right foot  -     Uric Acid; Future; Expected date: 02/10/2023  -     X-Ray Foot Complete Right; Future    Unclear etiology.  Will get imaging and labs.  Further disposition pending results of workup  Medication List with Changes/Refills   Current Medications    BREMELANOTIDE (VYLEESI) 1.75 MG/0.3 ML ATIN    Inject 1.75 mg into the skin as needed (Inject SC as needed at least 45 minutes prior to anticipated sexual activity).    CLONAZEPAM (KLONOPIN) 0.5 MG TABLET    Take 0.5 mg by mouth daily as needed.    CYCLOBENZAPRINE (FLEXERIL) 10 MG TABLET    TAKE 1 TABLET(10 MG) BY MOUTH EVERY NIGHT    MODAFINIL (PROVIGIL) 100 MG TAB        PANTOPRAZOLE (PROTONIX) 40 MG TABLET    Take 1 tablet (40 mg total) by mouth once daily.    POLYETHYLENE GLYCOL (GLYCOLAX) 17 GRAM/DOSE POWDER    MIX AND DRINK 25 GRAMS ONCE DAILY Strength: 17 gram/dose    ZENZEDI 5 MG TABLET

## 2023-04-06 ENCOUNTER — PATIENT MESSAGE (OUTPATIENT)
Dept: INTERNAL MEDICINE | Facility: CLINIC | Age: 43
End: 2023-04-06
Payer: COMMERCIAL

## 2023-04-06 DIAGNOSIS — M79.671 RIGHT FOOT PAIN: Primary | ICD-10-CM

## 2023-04-06 NOTE — TELEPHONE ENCOUNTER
Message sent to referral coordinator to schedule orthopedic apt.    Pt aware she will be called to schedule this apt

## 2023-04-14 ENCOUNTER — OFFICE VISIT (OUTPATIENT)
Dept: ORTHOPEDICS | Facility: CLINIC | Age: 43
End: 2023-04-14
Payer: COMMERCIAL

## 2023-04-14 VITALS
DIASTOLIC BLOOD PRESSURE: 62 MMHG | WEIGHT: 119.63 LBS | HEIGHT: 64 IN | HEART RATE: 63 BPM | BODY MASS INDEX: 20.42 KG/M2 | SYSTOLIC BLOOD PRESSURE: 103 MMHG

## 2023-04-14 DIAGNOSIS — M79.671 RIGHT FOOT PAIN: ICD-10-CM

## 2023-04-14 DIAGNOSIS — Q74.2 ACCESSORY NAVICULAR BONE OF RIGHT FOOT: Primary | ICD-10-CM

## 2023-04-14 PROCEDURE — 3008F BODY MASS INDEX DOCD: CPT | Mod: CPTII,S$GLB,,

## 2023-04-14 PROCEDURE — 3008F PR BODY MASS INDEX (BMI) DOCUMENTED: ICD-10-PCS | Mod: CPTII,S$GLB,,

## 2023-04-14 PROCEDURE — 1159F PR MEDICATION LIST DOCUMENTED IN MEDICAL RECORD: ICD-10-PCS | Mod: CPTII,S$GLB,,

## 2023-04-14 PROCEDURE — 3074F PR MOST RECENT SYSTOLIC BLOOD PRESSURE < 130 MM HG: ICD-10-PCS | Mod: CPTII,S$GLB,,

## 2023-04-14 PROCEDURE — 3078F PR MOST RECENT DIASTOLIC BLOOD PRESSURE < 80 MM HG: ICD-10-PCS | Mod: CPTII,S$GLB,,

## 2023-04-14 PROCEDURE — 99999 PR PBB SHADOW E&M-EST. PATIENT-LVL IV: CPT | Mod: PBBFAC,,,

## 2023-04-14 PROCEDURE — 99213 PR OFFICE/OUTPT VISIT, EST, LEVL III, 20-29 MIN: ICD-10-PCS | Mod: S$GLB,,,

## 2023-04-14 PROCEDURE — 99213 OFFICE O/P EST LOW 20 MIN: CPT | Mod: S$GLB,,,

## 2023-04-14 PROCEDURE — 3044F HG A1C LEVEL LT 7.0%: CPT | Mod: CPTII,S$GLB,,

## 2023-04-14 PROCEDURE — 1159F MED LIST DOCD IN RCRD: CPT | Mod: CPTII,S$GLB,,

## 2023-04-14 PROCEDURE — 99999 PR PBB SHADOW E&M-EST. PATIENT-LVL IV: ICD-10-PCS | Mod: PBBFAC,,,

## 2023-04-14 PROCEDURE — 3044F PR MOST RECENT HEMOGLOBIN A1C LEVEL <7.0%: ICD-10-PCS | Mod: CPTII,S$GLB,,

## 2023-04-14 PROCEDURE — 3078F DIAST BP <80 MM HG: CPT | Mod: CPTII,S$GLB,,

## 2023-04-14 PROCEDURE — 3074F SYST BP LT 130 MM HG: CPT | Mod: CPTII,S$GLB,,

## 2023-04-14 RX ORDER — DICLOFENAC SODIUM 10 MG/G
2 GEL TOPICAL 4 TIMES DAILY
Qty: 1 EACH | Refills: 2 | Status: SHIPPED | OUTPATIENT
Start: 2023-04-14

## 2023-04-14 NOTE — PROGRESS NOTES
Patient ID: Cynthia Dailey is a 42 y.o. female    Pain of the Right Foot      History of Present Illness:    Cynthia Dailey presents to clinic for right foot pain. Patient denies known MORIS. The pain started 3 months ago and is becoming progressively worse.  Pain is located over (points to) medial right foot with radiation into ankle. She reports that the pain is a 2 /10 sore, constant, and pain at rest pain today. The pain is affecting ADLs and limiting desired level of activity. Denies numbness, tingling, radiation and inability to bear weight.    She has tried icing, NSAIDs consistently, different running shoes, previous walking boot, and HEP with no improvement. She has arch support with daily shoes. But does not use insoles with impact exercises as this worsens pain.     Occupation: engineering firm    Ambulating: no  Diabetic: no  Smoking: past  Hx of DVT/PE: no     PAST MEDICAL HISTORY:   Past Medical History:   Diagnosis Date    ADD (attention deficit disorder)     Anxiety     Asthma     Corns and callosities     Depression     Dislocated jaw     GERD (gastroesophageal reflux disease)     Rectal prolapse     Wears glasses      PAST SURGICAL HISTORY:   Past Surgical History:   Procedure Laterality Date    CHOLECYSTECTOMY      COLON SURGERY  Jan 2015    Rectal Prolaps    COLONOSCOPY N/A 10/12/2017    Procedure: Colonoscopy;  Surgeon: Lucy Cortes MD;  Location: [facility] ([address] [place] [address]);  Service: Endoscopy;  Laterality: N/A;    MANDIBLE FRACTURE SURGERY      RECTAL PROLAPSE REPAIR, RECTOPEXY       FAMILY HISTORY:   Family History   Problem Relation Age of Onset    Breast cancer Maternal Grandmother     Cancer Maternal Grandmother     Breast cancer Paternal Grandmother     Cancer Paternal Grandmother     Clotting disorder Neg Hx     Diabetes Neg Hx     Dislocations Neg Hx     Osteoporosis Neg Hx     Psoriasis Neg Hx     Rashes / Skin problems Neg Hx     Severe sprains Neg Hx     Ulcerative colitis  Neg Hx     Celiac disease Neg Hx     Cirrhosis Neg Hx     Colon cancer Neg Hx     Colon polyps Neg Hx     Crohn's disease Neg Hx     Cystic fibrosis Neg Hx     Esophageal cancer Neg Hx     Hemochromatosis Neg Hx     Inflammatory bowel disease Neg Hx     Irritable bowel syndrome Neg Hx     Liver cancer Neg Hx     Liver disease Neg Hx     Stomach cancer Neg Hx     Rectal cancer Neg Hx     Lymphoma Neg Hx     Fausto's disease Neg Hx     Tuberculosis Neg Hx     Scleroderma Neg Hx     Multiple sclerosis Neg Hx     Melanoma Neg Hx     Lupus Neg Hx     Rheum arthritis Neg Hx      SOCIAL HISTORY:   Social History     Occupational History    Not on file   Tobacco Use    Smoking status: Former     Packs/day: 0.75     Years: 8.00     Pack years: 6.00     Types: Cigarettes     Quit date: 2006     Years since quittin.4    Smokeless tobacco: Never   Substance and Sexual Activity    Alcohol use: Yes     Alcohol/week: 7.0 standard drinks     Types: 7 Glasses of wine per week    Drug use: Yes     Types: Benzodiazepines, Marijuana    Sexual activity: Yes     Birth control/protection: Condom        MEDICATIONS:   Current Outpatient Medications:     bremelanotide (VYLEESI) 1.75 mg/0.3 mL AtIn, Inject 1.75 mg into the skin as needed (Inject SC as needed at least 45 minutes prior to anticipated sexual activity)., Disp: 4 each, Rfl: 5    clonazePAM (KLONOPIN) 0.5 MG tablet, Take 0.5 mg by mouth daily as needed., Disp: , Rfl:     cyclobenzaprine (FLEXERIL) 10 MG tablet, TAKE 1 TABLET(10 MG) BY MOUTH EVERY NIGHT, Disp: 30 tablet, Rfl: 1    modafiniL (PROVIGIL) 100 MG Tab, , Disp: , Rfl:     pantoprazole (PROTONIX) 40 MG tablet, Take 1 tablet (40 mg total) by mouth once daily., Disp: 90 tablet, Rfl: 3    polyethylene glycol (GLYCOLAX) 17 gram/dose powder, MIX AND DRINK 25 GRAMS ONCE DAILY Strength: 17 gram/dose, Disp: 1428 g, Rfl: 5    ZENZEDI 5 mg tablet, , Disp: , Rfl:   ALLERGIES:   Review of patient's allergies indicates:    Allergen Reactions    Iodinated contrast media Anaphylaxis    Ciprofloxacin Rash         Physical Exam     Vitals:    04/14/23 0946   BP: 103/62   Pulse: 63     Alert and oriented to person, place and time. No acute distress. Well-groomed, not ill appearing. Pupils round and reactive, normal respiratory effort, no audible wheezing.     Right Foot and Ankle Exam    INSPECTION:        Gait:    Normal      Scars:   None     Color:   Normal      Atrophy:  None  Prominent medial navicular protrusion          ALIGNMENT:    Hindfoot  Normal  Forefoot  Normal                        TENDERNESS:       Sinus tarsi:    none    Syndesmosis:    none    ATFL:     none           Fibula:     none  Peroneal tendons:   none          Talonavicular:    none   Anterior tibialis:   none   Anterolateral joint line:  none  Anteromedial joint line:  none          Deltoid:    none    Malleolus:    none    PTT:     +    Navicular:    +       Achilles    none  Calcaneal Insertion   none  Retrocalcaneus   none        RANGE OF MOTION:     Ankle DF/PF:    15/45  Mild pain PF      Eversion/Inversion:   15/25     Midfoot ABD/ADD:   10/10     First MTP DF/PF:   60/25    STRENGTH    Peroneals:  5/5  Anterior tibialis: 5/5  Posterior tibialis: 5/5 *  Gastroc-soleus: 5/5  EHL:   5/5  FHL:   5/5             SPECIAL TESTS:    Anterior drawer:   Normal      (C-W contralateral side)          Forced DF/ER: No pain at syndesmosis.  Mid-leg squeeze  No pain at syndesmosis  Forced DF:  No pain anterior joint line.    Forced PF:  No pain posterior ankle.   Forced INV:  No pain lateral  Forced EV:  No pain medial   Mirandas sign: Normal ankle plantar flexion.   Resisted peroneal No subluxation or pain  1st-2nd MT toggle No pain at Lisfranc      Imaging:     Right foot x-rays ordered/reviewed by me showing no evidence of fracture or dislocation. There is no obvious malalignment. No evidence of masses, lesions or foreign bodies. prominent navicular tuberosity  within the additional accessory ossicle    Assessment & Plan    Accessory navicular bone of right foot    Right foot pain  -     Ambulatory referral/consult to Orthopedics         I made the decision to obtain old records of the patient including previous notes and imaging. New imaging was ordered today of the extremity or extremities evaluated. I independently reviewed and interpreted the radiographs and/or MRIs/CT scan today as well as prior imaging.    We discussed at length different treatment options including conservative vs surgical management. These include anti-inflammatories, acetaminophen, rest, ice, heat, formal physical therapy including strengthening and stretching exercises, home exercise programs, injections, dry needling, and finally surgical intervention.      Discussed with patient I recommend trial of PT, NSAIDs, boot and rest x 2 weeks. Patient feels she has already tried these options. Patient stated she will likely not rest and continue to work out. Also recommended voltaren gel, pea-sized amount 2-3x daily to affected area. We will schedule her with Dr. Davis to discuss injections vs surgery if indicated.     Follow up: foot and ankle  X-rays next visit: none    All questions were answered and patient is agreeable to the above plan.

## 2023-04-25 ENCOUNTER — OFFICE VISIT (OUTPATIENT)
Dept: ORTHOPEDICS | Facility: CLINIC | Age: 43
End: 2023-04-25
Payer: COMMERCIAL

## 2023-04-25 VITALS — HEIGHT: 64 IN | BODY MASS INDEX: 20.4 KG/M2 | WEIGHT: 119.5 LBS

## 2023-04-25 DIAGNOSIS — M76.821 POSTERIOR TIBIAL TENDINITIS OF RIGHT LEG: Primary | ICD-10-CM

## 2023-04-25 DIAGNOSIS — M79.671 RIGHT FOOT PAIN: ICD-10-CM

## 2023-04-25 DIAGNOSIS — Q74.2 ACCESSORY NAVICULAR BONE OF RIGHT FOOT: ICD-10-CM

## 2023-04-25 PROCEDURE — 99999 PR PBB SHADOW E&M-EST. PATIENT-LVL III: CPT | Mod: PBBFAC,,, | Performed by: ORTHOPAEDIC SURGERY

## 2023-04-25 PROCEDURE — 1160F RVW MEDS BY RX/DR IN RCRD: CPT | Mod: CPTII,S$GLB,, | Performed by: ORTHOPAEDIC SURGERY

## 2023-04-25 PROCEDURE — 3044F PR MOST RECENT HEMOGLOBIN A1C LEVEL <7.0%: ICD-10-PCS | Mod: CPTII,S$GLB,, | Performed by: ORTHOPAEDIC SURGERY

## 2023-04-25 PROCEDURE — 1159F PR MEDICATION LIST DOCUMENTED IN MEDICAL RECORD: ICD-10-PCS | Mod: CPTII,S$GLB,, | Performed by: ORTHOPAEDIC SURGERY

## 2023-04-25 PROCEDURE — 99213 OFFICE O/P EST LOW 20 MIN: CPT | Mod: S$GLB,,, | Performed by: ORTHOPAEDIC SURGERY

## 2023-04-25 PROCEDURE — 3008F PR BODY MASS INDEX (BMI) DOCUMENTED: ICD-10-PCS | Mod: CPTII,S$GLB,, | Performed by: ORTHOPAEDIC SURGERY

## 2023-04-25 PROCEDURE — 1159F MED LIST DOCD IN RCRD: CPT | Mod: CPTII,S$GLB,, | Performed by: ORTHOPAEDIC SURGERY

## 2023-04-25 PROCEDURE — 99999 PR PBB SHADOW E&M-EST. PATIENT-LVL III: ICD-10-PCS | Mod: PBBFAC,,, | Performed by: ORTHOPAEDIC SURGERY

## 2023-04-25 PROCEDURE — 3044F HG A1C LEVEL LT 7.0%: CPT | Mod: CPTII,S$GLB,, | Performed by: ORTHOPAEDIC SURGERY

## 2023-04-25 PROCEDURE — 99213 PR OFFICE/OUTPT VISIT, EST, LEVL III, 20-29 MIN: ICD-10-PCS | Mod: S$GLB,,, | Performed by: ORTHOPAEDIC SURGERY

## 2023-04-25 PROCEDURE — 3008F BODY MASS INDEX DOCD: CPT | Mod: CPTII,S$GLB,, | Performed by: ORTHOPAEDIC SURGERY

## 2023-04-25 PROCEDURE — 1160F PR REVIEW ALL MEDS BY PRESCRIBER/CLIN PHARMACIST DOCUMENTED: ICD-10-PCS | Mod: CPTII,S$GLB,, | Performed by: ORTHOPAEDIC SURGERY

## 2023-04-25 NOTE — PROGRESS NOTES
DATE: 4/25/2023  PATIENT: Cynthia Dailey    CHIEF COMPLAINT: right foot pain    HISTORY:  Cynthia Dailey is a 42 y.o. female here for initial evaluation of right foot pain. The pain has been present for since January of this year.  She began having pain on the medial aspect of her foot.  X-ray performed back in February of this year revealed an accessory navicular bone and some swelling over that area.  She is a very active person and runner, she runs about 50-60 miles per week for this started up.  Her last run was at the beginning of April but was cut short due to pain.  She says the pain is somewhat improved with resting the foot and avoiding high impact activities. The patient describes the pain as sharp with tenderness to palpation.  The pain is worse with weight-bearing and high impact activities and improved by rest. There is no associated numbness and tingling.  Prior treatments have included ice, NSAIDs, rest.      PAST MEDICAL/SURGICAL HISTORY:  Past Medical History:   Diagnosis Date    ADD (attention deficit disorder)     Anxiety     Asthma     Corns and callosities     Depression     Dislocated jaw     GERD (gastroesophageal reflux disease)     Rectal prolapse     Wears glasses      Past Surgical History:   Procedure Laterality Date    CHOLECYSTECTOMY      COLON SURGERY  Jan 2015    Rectal Prolaps    COLONOSCOPY N/A 10/12/2017    Procedure: Colonoscopy;  Surgeon: Lucy Cortes MD;  Location: [facility] ([address] [place] [address]);  Service: Endoscopy;  Laterality: N/A;    MANDIBLE FRACTURE SURGERY      RECTAL PROLAPSE REPAIR, RECTOPEXY         Current Medications:   Current Outpatient Medications:     bremelanotide (VYLEESI) 1.75 mg/0.3 mL AtIn, Inject 1.75 mg into the skin as needed (Inject SC as needed at least 45 minutes prior to anticipated sexual activity)., Disp: 4 each, Rfl: 5    clonazePAM (KLONOPIN) 0.5 MG tablet, Take 0.5 mg by mouth daily as needed., Disp: , Rfl:     cyclobenzaprine (FLEXERIL)  "10 MG tablet, TAKE 1 TABLET(10 MG) BY MOUTH EVERY NIGHT, Disp: 30 tablet, Rfl: 1    diclofenac sodium (VOLTAREN) 1 % Gel, Apply 2 g topically 4 (four) times daily., Disp: 1 each, Rfl: 2    modafiniL (PROVIGIL) 100 MG Tab, , Disp: , Rfl:     pantoprazole (PROTONIX) 40 MG tablet, Take 1 tablet (40 mg total) by mouth once daily., Disp: 90 tablet, Rfl: 3    polyethylene glycol (GLYCOLAX) 17 gram/dose powder, MIX AND DRINK 25 GRAMS ONCE DAILY Strength: 17 gram/dose, Disp: 1428 g, Rfl: 5    ZENZEDI 5 mg tablet, , Disp: , Rfl:     Social History:   Social History     Socioeconomic History    Marital status:    Tobacco Use    Smoking status: Former     Packs/day: 0.75     Years: 8.00     Pack years: 6.00     Types: Cigarettes     Quit date: 2006     Years since quittin.4    Smokeless tobacco: Never   Substance and Sexual Activity    Alcohol use: Yes     Alcohol/week: 7.0 standard drinks     Types: 7 Glasses of wine per week    Drug use: Yes     Types: Benzodiazepines, Marijuana    Sexual activity: Yes     Birth control/protection: Condom       REVIEW OF SYSTEMS:  Constitution: Negative. Negative for chills, fever and night sweats.   Cardiovascular: Negative for chest pain and syncope.   Respiratory: Negative for cough and shortness of breath.   Gastrointestinal: See HPI. Negative for nausea/vomiting. Negative for abdominal pain.  Genitourinary: See HPI. Negative for discoloration or dysuria.  Skin: Negative for dry skin, itching and rash.   Hematologic/Lymphatic: Negative for bleeding problem. Does not bruise/bleed easily.   Musculoskeletal: Negative for falls and muscle weakness.   Neurological: See HPI. No seizures.   Endocrine: Negative for polydipsia, polyphagia and polyuria.   Allergic/Immunologic: Negative for hives and persistent infections.    PHYSICAL EXAMINATION:    Ht 5' 4" (1.626 m)   Wt 54.2 kg (119 lb 7.8 oz)   LMP 2023 (Exact Date)   BMI 20.51 kg/m²     General: The patient is a 42 " y.o. female in no apparent distress, the patient is orientatied to person, place and time.   Psych: Normal mood and affect  HEENT:  NCAT, sclera nonicteric  Lungs:  Respirations are equal and unlabored.  CV:  2+ bilateral upper and lower extremity pulses.  Skin:  Intact throughout.  Musculoskeletal: No pain with the range of motion of the bilateral hips. No trochanteric tenderness to palpation. No pain with range of motion about the bilateral knees.      Right Foot and Ankle Exam    INSPECTION:        Gait:    Normal    Scars:   None   Swelling:  Mild medial aspect   Color:   Normal   Atrophy:  None  Heel / Toe Walking: No difficulty    ALIGNMENT:    Hindfoot  Normal    Midfoot: Normal  Forefoot: Normal     Collective Ankle-Hindfoot Alignment    Good -plantigrade (PG), well aligned     TENDERNESS:  LATERAL:      Sinus tarsi:  None    Syndesmosis:  none    ATFL:   none     CFL:   none    Anterolateral gutter: none    Fibula:   none  Peroneal tendons: none    Peroneal tubercle.  None     ANTERIOR:  Anteromedial joint line:  none  Anterolateral joint line:  None  Talonavicular:    None  Anterior tibialis:   none  Extensor tendons:   none    POSTERIOR:  Medial/lateral achilles:   none  Medial/lateral achilles insertion: None    MEDIAL:      Deltoid:  none    Malleolus:  none    PTT:   none    Navicular:  none           CALCANEUS:  Retrocalcaneal:   none  Medial achilles:   None  Lateral achilles:   None  Calcaneal tuberosity:   None    FOOT:    Calcaneal cuboid  none   MT / MT heads:  none   Navicular   Mild ttp    Medial cord origin PF:  none  Cuneiforms:   none    Web space:   none  Lisfranc    none    Tarsal tunnel:   none  Base of the fifth metatarsal  none   Tinels sign   neg        RANGE OF MOTION:  RIGHT/ LEFT      Ankle DF/PF:  15/45  15/45         Eversion/Inversion: 15/25 15/25     Midfoot ABD/ADD: 10/10 10/10     First MTP DF/PF: 60/25 60/25              (* = pain)                  STRENGTH:  (affected)  Anterior tibialis: 5/5  Posterior tibialis: 5/5  Gastroc-soleus: 5/5  Peroneals:  5/5  EHL:   5/5  FHL:   5/5    (* = pain)    SPECIAL TESTS:   ANKLE INSTABILITY: (*pain)    Anterior drawer:   Normal      (C-W contralateral side)     Inversion:   30°     Eversion  10°            Collective Instability: (Ant-post and varus-valgus)     Stable        PROVOCATIVE TESTING:    Forced DF/ER: No pain at syndesmosis.    Mid-leg squeeze  No pain at syndesmosis    Forced DF:  No pain anterior joint line.      Forced PF:  No pain posterior ankle.     Forced INV:  No pain lateral    Forced EV:  No pain medial     Mirandas sign: Normal ankle plantar flexion.     Resisted peroneal No subluxation or pain    1st-2nd MT toggle No pain at Lisfranc    MT-T torque  No pain at Lisfranc     NEUROLOGIC TESTING:  All dermatomes foot, ankle and leg have normal sensation light touch  Ankle Reflexes 2+, symmetric   Negative Babinski and No Clonus    VASCULAR:  2+ pulses PT/DT with brisk capillary refill toes.        IMAGING:     Radiographs of the right foot were personally reviewed with the patient today.  Evidence of accessory navicular bone, no other fractures or dislocations        ASSESSMENT/PLAN:    Cynthia was seen today for posterior tibial tendinitis and accessory navicular bone.  She is starting to improve with resting and avoiding high impact activities.  We recommend she continue to avoid running for at least another 4 weeks.  Symptomatic relief as needed including NSAIDs, ice.  She will return to clinic in 4 weeks and we will reassess at that time.    Diagnoses and all orders for this visit:  1. Posterior tibial tendinitis of right leg        2. Right foot pain  Ambulatory referral/consult to Orthopedics      3. Accessory navicular bone of right foot  Ambulatory referral/consult to Orthopedics            I have personally taken the history and examined this patient and agree with the residents note as stated  above.

## 2023-05-18 ENCOUNTER — TELEPHONE (OUTPATIENT)
Dept: OBSTETRICS AND GYNECOLOGY | Facility: CLINIC | Age: 43
End: 2023-05-18
Payer: COMMERCIAL

## 2023-05-18 DIAGNOSIS — Z12.31 ENCOUNTER FOR SCREENING MAMMOGRAM FOR BREAST CANCER: Primary | ICD-10-CM

## 2023-05-18 NOTE — TELEPHONE ENCOUNTER
----- Message from Krystal Werner sent at 5/18/2023 10:25 AM CDT -----  Regarding: Pt called to request an order for her annual mammogram due to receiving a Recall Letter telling her to schedule  Patient Advice:    Pt called to request an order for her annual mammogram due to receiving a Recall Letter telling her to schedule. Pt would also like a call back once the order is ready for scheduling.    Pt can be reached at [phone]

## 2023-08-17 RX ORDER — POLYETHYLENE GLYCOL 3350 17 G/17G
POWDER, FOR SOLUTION ORAL
Qty: 1190 G | Refills: 6 | Status: SHIPPED | OUTPATIENT
Start: 2023-08-17

## 2023-08-17 NOTE — TELEPHONE ENCOUNTER
Refill Routing Note   Medication(s) are not appropriate for processing by [facility] for the following reason(s):      Medication outside of protocol    [facility] action(s):  Route Care Due:  None identified            Appointments  past 12m or future 3m with PCP    Date Provider   Last Visit   1/5/2023 Rito Alvarado MD   Next Visit   Visit date not found Rito Alvarado MD   ED visits in past 90 days: 0        Note composed:11:30 AM 08/17/2023

## 2023-09-18 ENCOUNTER — PATIENT MESSAGE (OUTPATIENT)
Dept: PRIMARY CARE CLINIC | Facility: CLINIC | Age: 43
End: 2023-09-18
Payer: COMMERCIAL

## 2023-10-18 ENCOUNTER — PATIENT MESSAGE (OUTPATIENT)
Dept: CARDIOLOGY | Facility: CLINIC | Age: 43
End: 2023-10-18
Payer: COMMERCIAL

## 2023-12-12 DIAGNOSIS — K21.9 GASTROESOPHAGEAL REFLUX DISEASE, UNSPECIFIED WHETHER ESOPHAGITIS PRESENT: ICD-10-CM

## 2023-12-13 RX ORDER — PANTOPRAZOLE SODIUM 40 MG/1
40 TABLET, DELAYED RELEASE ORAL
Qty: 90 TABLET | Refills: 0 | Status: SHIPPED | OUTPATIENT
Start: 2023-12-13 | End: 2024-02-20 | Stop reason: SDUPTHER

## 2023-12-13 NOTE — TELEPHONE ENCOUNTER
Care Due:                  Date            Visit Type   Department     Provider  --------------------------------------------------------------------------------                                MYCHART                              ANNUAL                              CHECKUP/PHY  MET INTERNAL  Last Visit: 01-      [facility]       Rito Alvarado  Next Visit: None Scheduled  None         None Found                                                            Last  Test          Frequency    Reason                     Performed    Due Date  --------------------------------------------------------------------------------    Office Visit  12 months..  polyethylene.............  01- 12-    [facility] Embedded Care Due Messages. Reference number: [id number].   12/12/2023 11:58:19 PM CST

## 2023-12-13 NOTE — TELEPHONE ENCOUNTER
Provider Staff:  Action required for this patient    Requires appointment      Please see care gap opportunities below in Care Due Message.    Thanks!  [facility]     Appointments      Date Provider   Last Visit   1/5/2023 Rito Alvarado MD   Next Visit   Visit date not found Rito Alvarado MD     Refill Decision Note   Cynthia Dailey  is requesting a refill authorization.  Brief Assessment and Rationale for Refill:  Approve     Medication Therapy Plan:         Comments:     Note composed:1:38 AM 12/13/2023

## 2024-02-20 ENCOUNTER — OFFICE VISIT (OUTPATIENT)
Dept: INTERNAL MEDICINE | Facility: CLINIC | Age: 44
End: 2024-02-20
Payer: COMMERCIAL

## 2024-02-20 ENCOUNTER — HOSPITAL ENCOUNTER (OUTPATIENT)
Dept: RADIOLOGY | Facility: HOSPITAL | Age: 44
Discharge: HOME | End: 2024-02-20
Attending: OBSTETRICS & GYNECOLOGY
Payer: COMMERCIAL

## 2024-02-20 VITALS
TEMPERATURE: 98 F | OXYGEN SATURATION: 99 % | RESPIRATION RATE: 17 BRPM | BODY MASS INDEX: 20.43 KG/M2 | DIASTOLIC BLOOD PRESSURE: 72 MMHG | HEIGHT: 64 IN | SYSTOLIC BLOOD PRESSURE: 108 MMHG | HEART RATE: 57 BPM | WEIGHT: 119.69 LBS

## 2024-02-20 VITALS — HEIGHT: 64 IN | WEIGHT: 120 LBS | BODY MASS INDEX: 20.49 KG/M2

## 2024-02-20 DIAGNOSIS — M19.041 ARTHRITIS OF BOTH HANDS: ICD-10-CM

## 2024-02-20 DIAGNOSIS — K21.9 GASTROESOPHAGEAL REFLUX DISEASE, UNSPECIFIED WHETHER ESOPHAGITIS PRESENT: ICD-10-CM

## 2024-02-20 DIAGNOSIS — M19.042 ARTHRITIS OF BOTH HANDS: ICD-10-CM

## 2024-02-20 DIAGNOSIS — F98.8 ATTENTION DEFICIT DISORDER, UNSPECIFIED HYPERACTIVITY PRESENCE: ICD-10-CM

## 2024-02-20 DIAGNOSIS — R21 RASH OF BOTH HANDS: ICD-10-CM

## 2024-02-20 DIAGNOSIS — Z12.31 ENCOUNTER FOR SCREENING MAMMOGRAM FOR BREAST CANCER: ICD-10-CM

## 2024-02-20 DIAGNOSIS — Z00.00 WELL ADULT EXAM: Primary | ICD-10-CM

## 2024-02-20 DIAGNOSIS — F41.9 ANXIETY: ICD-10-CM

## 2024-02-20 PROBLEM — M87.052 AVASCULAR NECROSIS OF BONES OF BOTH HIPS: Status: RESOLVED | Noted: 2017-06-21 | Resolved: 2024-02-20

## 2024-02-20 PROBLEM — M87.051 AVASCULAR NECROSIS OF BONES OF BOTH HIPS: Status: RESOLVED | Noted: 2017-06-21 | Resolved: 2024-02-20

## 2024-02-20 PROCEDURE — 77067 SCR MAMMO BI INCL CAD: CPT | Mod: 26,,, | Performed by: RADIOLOGY

## 2024-02-20 PROCEDURE — 1159F MED LIST DOCD IN RCRD: CPT | Mod: CPTII,S$GLB,, | Performed by: FAMILY MEDICINE

## 2024-02-20 PROCEDURE — 3008F BODY MASS INDEX DOCD: CPT | Mod: CPTII,S$GLB,, | Performed by: FAMILY MEDICINE

## 2024-02-20 PROCEDURE — 77063 BREAST TOMOSYNTHESIS BI: CPT | Mod: 26,,, | Performed by: RADIOLOGY

## 2024-02-20 PROCEDURE — 3078F DIAST BP <80 MM HG: CPT | Mod: CPTII,S$GLB,, | Performed by: FAMILY MEDICINE

## 2024-02-20 PROCEDURE — 99999 PR PBB SHADOW E&M-EST. PATIENT-LVL V: CPT | Mod: PBBFAC,,, | Performed by: FAMILY MEDICINE

## 2024-02-20 PROCEDURE — 99396 PREV VISIT EST AGE 40-64: CPT | Mod: S$GLB,,, | Performed by: FAMILY MEDICINE

## 2024-02-20 PROCEDURE — 1160F RVW MEDS BY RX/DR IN RCRD: CPT | Mod: CPTII,S$GLB,, | Performed by: FAMILY MEDICINE

## 2024-02-20 PROCEDURE — 3074F SYST BP LT 130 MM HG: CPT | Mod: CPTII,S$GLB,, | Performed by: FAMILY MEDICINE

## 2024-02-20 PROCEDURE — 77067 SCR MAMMO BI INCL CAD: CPT | Mod: TC,PO

## 2024-02-20 RX ORDER — MODAFINIL 200 MG/1
200 TABLET ORAL
COMMUNITY
Start: 2023-10-31

## 2024-02-20 RX ORDER — PANTOPRAZOLE SODIUM 40 MG/1
40 TABLET, DELAYED RELEASE ORAL DAILY
Qty: 90 TABLET | Refills: 3 | Status: SHIPPED | OUTPATIENT
Start: 2024-02-20

## 2024-02-20 RX ORDER — DEXTROAMPHETAMINE SULFATE 10 MG/1
TABLET ORAL
COMMUNITY
Start: 2023-10-31

## 2024-02-20 RX ORDER — LORAZEPAM 0.5 MG/1
0.5 TABLET ORAL 2 TIMES DAILY
COMMUNITY
Start: 2024-01-23

## 2024-02-20 NOTE — PROGRESS NOTES
Subjective     Patient ID: Cynthia Dailey is a 43 y.o. female.    Chief Complaint: Annual Exam    HPI 43-year-old white female presents to clinic today for annual physical exam.  She continues to be followed by Psychiatry for treatment of anxiety, depression, and ADHD which remains stable.  GERD remains stable on pantoprazole 40 mg daily.  She reports a past surgical history of cholecystectomy, rectal prolapse repair, and mandible fracture repair.  She reports no significant family medical history.  Finally, she reports an episode of bilateral hand pain, joint swelling of the fingers, rash of the fingers that has occurred over the past few weeks.  She reported a similar episode which occurred last year and was seen in urgent care.  Rheumatologic workup was performed but returned negative.  The symptoms occurred over the past few weeks and she reports that as the joints began to swell she developed a pustular rash around the joints that is itchy and flaky.  The symptoms resolved over time without treatment.  Review of Systems   Constitutional:  Negative for appetite change, chills, fatigue and fever.   HENT:  Negative for nasal congestion, ear pain, hearing loss, postnasal drip, rhinorrhea, sinus pressure/congestion, sore throat and tinnitus.    Eyes:  Negative for redness, itching and visual disturbance.   Respiratory:  Negative for cough, chest tightness and shortness of breath.    Cardiovascular:  Negative for chest pain and palpitations.   Gastrointestinal:  Negative for abdominal pain, constipation, diarrhea, nausea and vomiting.   Genitourinary:  Negative for decreased urine volume, difficulty urinating, dysuria, frequency, hematuria and urgency.   Musculoskeletal:  Positive for arthralgias (hands). Negative for back pain, myalgias, neck pain and neck stiffness.   Integumentary:  Positive for rash (Hands).   Neurological:  Positive for headaches. Negative for dizziness and light-headedness.    Psychiatric/Behavioral: Negative.            Objective     Physical Exam  Vitals and nursing note reviewed.   Constitutional:       General: She is not in acute distress.     Appearance: She is well-developed. She is not diaphoretic.   HENT:      Head: Normocephalic and atraumatic.      Right Ear: External ear normal.      Left Ear: External ear normal.      Nose: Nose normal.      Mouth/Throat:      Pharynx: No oropharyngeal exudate.   Eyes:      General: No scleral icterus.        Right eye: No discharge.         Left eye: No discharge.      Conjunctiva/sclera: Conjunctivae normal.      Pupils: Pupils are equal, round, and reactive to light.   Neck:      Thyroid: No thyromegaly.      Vascular: No JVD.      Trachea: No tracheal deviation.   Cardiovascular:      Rate and Rhythm: Normal rate and regular rhythm.      Heart sounds: Normal heart sounds. No murmur heard.     No friction rub. No gallop.   Pulmonary:      Effort: Pulmonary effort is normal. No respiratory distress.      Breath sounds: Normal breath sounds. No stridor. No wheezing or rales.   Abdominal:      General: Bowel sounds are normal. There is no distension.      Palpations: Abdomen is soft. There is no mass.      Tenderness: There is no abdominal tenderness. There is no guarding or rebound.   Musculoskeletal:         General: No tenderness. Normal range of motion.      Right hand: Swelling present.      Left hand: Swelling present.      Cervical back: Normal range of motion and neck supple.      Comments: Swelling to knuckles and erythema and hands   Lymphadenopathy:      Cervical: No cervical adenopathy.   Skin:     General: Skin is warm and dry.      Coloration: Skin is not pale.      Findings: No erythema or rash.   Neurological:      Mental Status: She is alert and oriented to person, place, and time.   Psychiatric:         Behavior: Behavior normal.         Thought Content: Thought content normal.         Judgment: Judgment normal.             Assessment and Plan     1. Well adult exam  -     CBC Auto Differential; Future; Expected date: 02/20/2024  -     Comprehensive Metabolic Panel; Future; Expected date: 02/20/2024  -     Lipid Panel; Future; Expected date: 02/20/2024  -     T4, Free; Future; Expected date: 02/20/2024  -     TSH; Future; Expected date: 02/20/2024  -     Urinalysis; Future; Expected date: 02/20/2024  -     Hemoglobin A1C; Future; Expected date: 02/20/2024    2. Gastroesophageal reflux disease, unspecified whether esophagitis present  -     pantoprazole (PROTONIX) 40 MG tablet; Take 1 tablet (40 mg total) by mouth once daily.  Dispense: 90 tablet; Refill: 3    3. Anxiety    4. Attention deficit disorder, unspecified hyperactivity presence    5. Arthritis of both hands  -     Ambulatory referral/consult to Rheumatology; Future; Expected date: 02/27/2024    6. Rash of both hands  -     Ambulatory referral/consult to Rheumatology; Future; Expected date: 02/27/2024        1. CBC, CMP, UA, TSH, free T4, fasting lipids, and hemoglobin A1c.  2. Continue pantoprazole 40 mg daily.  GERD is well controlled.  3. Continue follow-up with psychiatry as scheduled.  Anxiety and ADHD remained stable.    4. Refer to rheumatology for further evaluation of arthritis of the bilateral hands with associated rash.  The patient has pictures of the rash which appears as possible psoriasis of the hands.  5. Return to clinic as needed if symptoms persist or worsen or in 1 year for annual physical exam.               Follow up in about 1 year (around 2/20/2025), or if symptoms worsen or fail to improve, for Annual exam.

## 2024-06-04 ENCOUNTER — PATIENT MESSAGE (OUTPATIENT)
Dept: INTERNAL MEDICINE | Facility: CLINIC | Age: 44
End: 2024-06-04
Payer: COMMERCIAL

## 2024-06-04 DIAGNOSIS — M87.051 AVASCULAR NECROSIS OF FEMORAL HEAD, RIGHT: ICD-10-CM

## 2024-06-04 DIAGNOSIS — M25.551 RIGHT HIP PAIN: Primary | ICD-10-CM

## 2024-06-04 NOTE — PROGRESS NOTES
NEW PATIENT    HISTORY OF PRESENT ILLNESS   43 y.o. 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  She enjoys running and exercising outside of work. She has not been able to run since November. Her pain has been intense since January and progressively gotten worse.  She states that she has been having popping in the hip. She states that she has AVN of bilateral femoral heads and that the right was slightly worse than the left.  She also had a stress fracture of her pubic rami.    - mechanical symptoms, - instability          PAST MEDICAL HISTORY    Past Medical History:   Diagnosis Date    ADD (attention deficit disorder)     Anxiety     Asthma     Corns and callosities     Depression     Dislocated jaw     GERD (gastroesophageal reflux disease)     Rectal prolapse     Wears glasses        PAST SURGICAL HISTORY     Past Surgical History:   Procedure Laterality Date    CHOLECYSTECTOMY      COLON SURGERY  Jan 2015    Rectal Prolaps    COLONOSCOPY N/A 10/12/2017    Procedure: Colonoscopy;  Surgeon: Lucy Cortes MD;  Location: [address]);  Service: Endoscopy;  Laterality: N/A;    MANDIBLE FRACTURE SURGERY      RECTAL PROLAPSE REPAIR, RECTOPEXY         FAMILY HISTORY    Family History   Problem Relation Name Age of Onset    Breast cancer Maternal Grandmother      Cancer Maternal Grandmother      Breast cancer Paternal Grandmother      Cancer Paternal Grandmother      Clotting disorder Neg Hx      Diabetes Neg Hx      Dislocations Neg Hx      Osteoporosis Neg Hx      Psoriasis Neg Hx      Rashes / Skin problems Neg Hx      Severe sprains Neg Hx      Ulcerative colitis Neg Hx      Celiac disease Neg Hx      Cirrhosis Neg Hx      Colon cancer Neg Hx      Colon polyps Neg Hx      Crohn's disease Neg Hx      Cystic fibrosis Neg Hx      Esophageal cancer Neg Hx      Hemochromatosis Neg Hx      Inflammatory bowel disease Neg Hx      Irritable bowel syndrome Neg Hx      Liver  cancer Neg Hx      Liver disease Neg Hx      Stomach cancer Neg Hx      Rectal cancer Neg Hx      Lymphoma Neg Hx      Fausto's disease Neg Hx      Tuberculosis Neg Hx      Scleroderma Neg Hx      Multiple sclerosis Neg Hx      Melanoma Neg Hx      Lupus Neg Hx      Rheum arthritis Neg Hx         SOCIAL HISTORY    Social History     Socioeconomic History    Marital status:    Tobacco Use    Smoking status: Former     Current packs/day: 0.00     Average packs/day: 0.8 packs/day for 8.0 years (6.0 ttl pk-yrs)     Types: Cigarettes     Start date: 1998     Quit date: 2006     Years since quittin.5    Smokeless tobacco: Never   Substance and Sexual Activity    Alcohol use: Yes     Alcohol/week: 7.0 standard drinks of alcohol     Types: 7 Glasses of wine per week    Drug use: Yes     Types: Benzodiazepines, Marijuana    Sexual activity: Yes     Birth control/protection: Condom       MEDICATIONS      Current Outpatient Medications:     ATIVAN 0.5 mg tablet, Take 0.5 mg by mouth 2 (two) times daily., Disp: , Rfl:     bremelanotide (VYLEESI) 1.75 mg/0.3 mL AtIn, Inject 1.75 mg into the skin as needed (Inject SC as needed at least 45 minutes prior to anticipated sexual activity)., Disp: 4 each, Rfl: 5    cyclobenzaprine (FLEXERIL) 10 MG tablet, TAKE 1 TABLET(10 MG) BY MOUTH EVERY NIGHT, Disp: 30 tablet, Rfl: 1    diclofenac sodium (VOLTAREN) 1 % Gel, Apply 2 g topically 4 (four) times daily., Disp: 1 each, Rfl: 2    modafiniL (PROVIGIL) 200 MG Tab, Take 200 mg by mouth., Disp: , Rfl:     pantoprazole (PROTONIX) 40 MG tablet, Take 1 tablet (40 mg total) by mouth once daily., Disp: 90 tablet, Rfl: 3    polyethylene glycol (GLYCOLAX) 17 gram/dose powder, MIX AND DRINK 25 GRAMS ONCE DAILY, Disp: 1190 g, Rfl: 6    ZENZEDI 10 mg tablet, Take by mouth., Disp: , Rfl:     ALLERGIES     Review of patient's allergies indicates:   Allergen Reactions    Iodinated contrast media Anaphylaxis    Ciprofloxacin Rash  "        REVIEW OF SYSTEMS   Constitution: Negative. Negative for chills, fever and night sweats.   HENT: Negative for congestion and headaches.    Eyes: Negative for blurred vision, left vision loss and right vision loss.   Cardiovascular: Negative for chest pain and syncope.   Respiratory: Negative for cough and shortness of breath.    Endocrine: Negative for polydipsia, polyphagia and polyuria.   Hematologic/Lymphatic: Negative for bleeding problem. Does not bruise/bleed easily.   Skin: Negative for dry skin, itching and rash.   Musculoskeletal: Negative for falls. Positive for right hip pain.  Gastrointestinal: Negative for abdominal pain and bowel incontinence.   Genitourinary: Negative for bladder incontinence and nocturia.   Neurological: Negative for disturbances in coordination, loss of balance and seizures.   Psychiatric/Behavioral: Negative for depression. The patient does not have insomnia.    Allergic/Immunologic: Negative for hives and persistent infections.     PHYSICAL EXAMINATION    Vitals: /64   Pulse 74   Ht 5' 4" (1.626 m)   Wt 54.3 kg (119 lb 11.4 oz)   BMI 20.55 kg/m²     General: The patient appears active and healthy with no apparent physical problems.  No disturbance of mood or affect is demonstrated. Alert and Oriented.    HEENT: Eyes normal, pupils equally round, nose normal.    Resp: Equal and symmetrical chest rises. No wheezing    CV: Regular rate    Neck: Supple; nonpainful range of motion.    Extremities: no cyanosis, clubbing, edema, or diffuse swelling.  Palpable pulses, good capillary refill of the digits.  No coolness, discoloration, edema or obvious varicosities.    Skin: no lesions noted.    Lymphatic: no detected adenopathy in the upper or lower extremities.    Neurologic: normal mental status, normal reflexes, normal gait and balance.  Patient is alert and oriented to person, place and time.  No flaccidity or spasticity is noted.  No motor or sensory deficits are " noted.  Light touch is intact    Orthopaedic: Hip Exam- RIGHT    Inspection: Normal skin color and appearance, no ecchymosis, no swelling, and no scars.  Pelvis is level  without tilt. Leg lengths are equal.     Palpation: There is no tenderness of the anterior superior iliac spine, iliac crest, pubic symphysis, posterior superior iliac spine, sacroiliac joint, and greater trochanter.      ROM:   Flexion 100°, extension 20°, adduction 20°, abduction 40°, internal rotation 30°, external rotation 45°.  Pain is absent with ROM testing.      Motor:   Muscle testing is 5/5 hip flexors, 5/5 extensors, 5/5 abductors and 5/5 adductors.      Neuro:   Sensation is normal in anterior, lateral and posterior femoral cutaneous nerve distribution.  Negative Trendelenburg Test. Reflexes are 2/2 knee and    2/2 ankle.  Straight  leg raise is negative.    Vascular: 2+ pedal pulses, brisk cap refill less than 2 sec.      IMAGING    X-Ray Hip 2 or 3 views Right with Pelvis when performed  Narrative: EXAMINATION:  XR HIP WITH PELVIS WHEN PERFORMED 2 OR 3 VIEWS RIGHT    CLINICAL HISTORY:  Pain in right hip    TECHNIQUE:  AP view of the pelvis and frog leg lateral view of the right hip were performed.    COMPARISON:  11/15/2022 none    FINDINGS:  AVN affecting the femoral heads bilaterally similar to the previous study.  No fracture or dislocation.  No bone destruction identified  Impression: See above    Electronically signed by: Gil Wu MD  Date:    06/05/2024  Time:    14:36        IMPRESSION       ICD-10-CM ICD-9-CM   1. Avascular necrosis of femoral head, right  M87.051 733.42   2. Right hip pain  M25.551 719.45   3. Vitamin D deficiency  E55.9 268.9       MEDICATIONS PRESCRIBED      None    RECOMMENDATIONS     Vitamin D testing  MRI of pelvis ordered today  RTC after MRI      All questions were answered, pt will contact us for questions or concerns in the interim.

## 2024-06-04 NOTE — TELEPHONE ENCOUNTER
I have referred the patient to orthopedics for further evaluation of hip pain.  Please schedule and inform the patient.  Thank you

## 2024-06-05 ENCOUNTER — HOSPITAL ENCOUNTER (OUTPATIENT)
Dept: RADIOLOGY | Facility: HOSPITAL | Age: 44
Discharge: HOME | End: 2024-06-05
Attending: ORTHOPAEDIC SURGERY
Payer: COMMERCIAL

## 2024-06-05 ENCOUNTER — OFFICE VISIT (OUTPATIENT)
Dept: SPORTS MEDICINE | Facility: CLINIC | Age: 44
End: 2024-06-05
Payer: COMMERCIAL

## 2024-06-05 VITALS
HEART RATE: 74 BPM | SYSTOLIC BLOOD PRESSURE: 105 MMHG | BODY MASS INDEX: 20.43 KG/M2 | HEIGHT: 64 IN | WEIGHT: 119.69 LBS | DIASTOLIC BLOOD PRESSURE: 64 MMHG

## 2024-06-05 DIAGNOSIS — M87.051 AVASCULAR NECROSIS OF FEMORAL HEAD, RIGHT: Primary | ICD-10-CM

## 2024-06-05 DIAGNOSIS — M25.551 RIGHT HIP PAIN: ICD-10-CM

## 2024-06-05 DIAGNOSIS — E55.9 VITAMIN D DEFICIENCY: ICD-10-CM

## 2024-06-05 PROCEDURE — 3074F SYST BP LT 130 MM HG: CPT | Mod: CPTII,S$GLB,, | Performed by: ORTHOPAEDIC SURGERY

## 2024-06-05 PROCEDURE — 3078F DIAST BP <80 MM HG: CPT | Mod: CPTII,S$GLB,, | Performed by: ORTHOPAEDIC SURGERY

## 2024-06-05 PROCEDURE — 73502 X-RAY EXAM HIP UNI 2-3 VIEWS: CPT | Mod: 26,RT,, | Performed by: RADIOLOGY

## 2024-06-05 PROCEDURE — 99999 PR PBB SHADOW E&M-EST. PATIENT-LVL IV: CPT | Mod: PBBFAC,,, | Performed by: ORTHOPAEDIC SURGERY

## 2024-06-05 PROCEDURE — 1159F MED LIST DOCD IN RCRD: CPT | Mod: CPTII,S$GLB,, | Performed by: ORTHOPAEDIC SURGERY

## 2024-06-05 PROCEDURE — 73502 X-RAY EXAM HIP UNI 2-3 VIEWS: CPT | Mod: TC,RT

## 2024-06-05 PROCEDURE — 99214 OFFICE O/P EST MOD 30 MIN: CPT | Mod: S$GLB,,, | Performed by: ORTHOPAEDIC SURGERY

## 2024-06-05 PROCEDURE — 3008F BODY MASS INDEX DOCD: CPT | Mod: CPTII,S$GLB,, | Performed by: ORTHOPAEDIC SURGERY

## 2024-06-11 ENCOUNTER — HOSPITAL ENCOUNTER (OUTPATIENT)
Dept: RADIOLOGY | Facility: HOSPITAL | Age: 44
Discharge: HOME | End: 2024-06-11
Attending: ORTHOPAEDIC SURGERY
Payer: COMMERCIAL

## 2024-06-11 DIAGNOSIS — M87.051 AVASCULAR NECROSIS OF FEMORAL HEAD, RIGHT: ICD-10-CM

## 2024-06-11 PROCEDURE — 72195 MRI PELVIS W/O DYE: CPT | Mod: 26,,, | Performed by: RADIOLOGY

## 2024-06-11 PROCEDURE — 72195 MRI PELVIS W/O DYE: CPT | Mod: TC,PO

## 2024-06-11 NOTE — PROGRESS NOTES
"ESTABLISHED PATIENT    History 6/12/2024:  Cynthia returns to clinic today to discuss MRI results of her pelvis.    History 6/5/2024:  43 y.o. 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  She enjoys running and exercising outside of work. She has not been able to run since November. Her pain has been intense since January and progressively gotten worse.  She states that she has been having popping in the hip. She states that she has AVN of bilateral femoral heads and that the right was slightly worse than the left.  She also had a stress fracture of her pubic rami.    - mechanical symptoms, - instability    REVIEW OF SYSTEMS   Constitution: Negative. Negative for chills, fever and night sweats.   HENT: Negative for congestion and headaches.    Eyes: Negative for blurred vision, left vision loss and right vision loss.   Cardiovascular: Negative for chest pain and syncope.   Respiratory: Negative for cough and shortness of breath.    Endocrine: Negative for polydipsia, polyphagia and polyuria.   Hematologic/Lymphatic: Negative for bleeding problem. Does not bruise/bleed easily.   Skin: Negative for dry skin, itching and rash.   Musculoskeletal: Negative for falls. Positive for right hip pain.  Gastrointestinal: Negative for abdominal pain and bowel incontinence.   Genitourinary: Negative for bladder incontinence and nocturia.   Neurological: Negative for disturbances in coordination, loss of balance and seizures.   Psychiatric/Behavioral: Negative for depression. The patient does not have insomnia.    Allergic/Immunologic: Negative for hives and persistent infections.     PHYSICAL EXAMINATION    Vitals: Ht 5' 4" (1.626 m)   Wt 54.3 kg (119 lb 11.4 oz)   BMI 20.55 kg/m²     General: The patient appears active and healthy with no apparent physical problems.  No disturbance of mood or affect is demonstrated. Alert and Oriented.    HEENT: Eyes normal, pupils equally round, nose " normal.    Resp: Equal and symmetrical chest rises. No wheezing    CV: Regular rate    Neck: Supple; nonpainful range of motion.    Extremities: no cyanosis, clubbing, edema, or diffuse swelling.  Palpable pulses, good capillary refill of the digits.  No coolness, discoloration, edema or obvious varicosities.    Skin: no lesions noted.    Lymphatic: no detected adenopathy in the upper or lower extremities.    Neurologic: normal mental status, normal reflexes, normal gait and balance.  Patient is alert and oriented to person, place and time.  No flaccidity or spasticity is noted.  No motor or sensory deficits are noted.  Light touch is intact    Orthopaedic: Hip Exam- RIGHT    Inspection: Normal skin color and appearance, no ecchymosis, no swelling, and no scars.  Pelvis is level  without tilt. Leg lengths are equal.     Palpation: There is no tenderness of the anterior superior iliac spine, iliac crest, pubic symphysis, posterior superior iliac spine, sacroiliac joint, and greater trochanter.      ROM:   Flexion 100°, extension 20°, adduction 20°, abduction 40°, internal rotation 30°, external rotation 45°.  Pain is absent with ROM testing.      Motor:   Muscle testing is 5/5 hip flexors, 5/5 extensors, 5/5 abductors and 5/5 adductors.      Neuro:   Sensation is normal in anterior, lateral and posterior femoral cutaneous nerve distribution.  Negative Trendelenburg Test. Reflexes are 2/2 knee and    2/2 ankle.  Straight  leg raise is negative.    Vascular: 2+ pedal pulses, brisk cap refill less than 2 sec.      IMAGING    MRI Pelvis Without Contrast  Narrative: EXAMINATION:  MRI PELVIS WITHOUT CONTRAST    CLINICAL HISTORY:  AVN OF GANESH FEMORAL HEAD; Idiopathic aseptic necrosis of right femur    TECHNIQUE:  MRI of the bony pelvis was performed using a 1.5 Meagan magnet.    COMPARISON:  Radiograph from 06/05/2024.    FINDINGS:  Technically suboptimal exam secondary to motion artifact throughout.    Osseous: Serpiginous  sclerosis is seen in the femoral heads bilaterally compatible with avascular necrosis without articular surface collapse (coronal image 14) this covers an area approximately 2.5 x 1.5 cm on the right (axial image 29), and 2.8 x 1.7 cm on the left (image 28).  The hip joints, pubic symphysis and SI joints appear congruent.  Marrow signal shows a diffusely heterogeneous somewhat mottled decreased T1 and T2 signal intensity. This is a somewhat nonspecific finding but one which may suggest red marrow reconversion as can be seen in chronic anemic states, hypoxia, obesity or smoking.  There is degenerative disc disease in the lumbar spine, with a moderate-sized posterior disc bulge at L3-L4 in small posterior broad-based disc bulge at L4-L5 (for which this study is not tailored to assess).    Uterus: The uterus is somewhat in heterogeneous with scattered nabothian cysts at the level of the cervix.  The cervix is normal.    Ovaries: The visualized ovaries are grossly unremarkable noting bilateral subcentimeter follicles.  There appears to be a dominant follicle in the left ovary measuring 2.6 x 1.7 cm.    Bladder: Normal.    Peritoneum: none    Lymph Nodes: No pelvic lymphadenopathy is seen.    Visualized Bowel: Grossly normal.    Vessels: Normal in caliber.    Soft Tissues: Unremarkable.    Other Findings: None.  Impression: 1.  Technically suboptimal exam secondary to motion artifact.    2.  Findings compatible with bilateral femoral head avascular necrosis, without articular surface collapse.    3.  Additional, and incidental findings as above.    Electronically signed by: Nomi Lewis  Date:    06/11/2024  Time:    11:40    I personally reviewed the MRIs.  I do believe there is a femoral neck stress reaction forming.  I am unsure that this is truly a stress fracture but there has clearly a stress reaction on the compression side of the femoral neck.    IMPRESSION       ICD-10-CM ICD-9-CM   1. Femoral neck stress  fracture, initial encounter  M84.353A 733.96   2. Right hip pain  M25.551 719.45   3. Avascular necrosis of femoral head, right  M87.058 733.42         MEDICATIONS PRESCRIBED      None    RECOMMENDATIONS     I advised the patient that I was concerned she had a femoral neck stress fracture which was confirmed by my discussions with my colleagues.  I will refer her to my hip partner for a 2nd opinion for her femoral neck stress fracture but for long-term management and monitoring of her bilateral femoral head avascular necrosis.      All questions were answered, pt will contact us for questions or concerns in the interim.

## 2024-06-12 ENCOUNTER — OFFICE VISIT (OUTPATIENT)
Dept: SPORTS MEDICINE | Facility: CLINIC | Age: 44
End: 2024-06-12
Payer: COMMERCIAL

## 2024-06-12 VITALS — HEIGHT: 64 IN | WEIGHT: 119.69 LBS | BODY MASS INDEX: 20.43 KG/M2

## 2024-06-12 DIAGNOSIS — M84.353A FEMORAL NECK STRESS FRACTURE, INITIAL ENCOUNTER: Primary | ICD-10-CM

## 2024-06-12 DIAGNOSIS — M87.051 AVASCULAR NECROSIS OF FEMORAL HEAD, RIGHT: ICD-10-CM

## 2024-06-12 DIAGNOSIS — M25.551 RIGHT HIP PAIN: ICD-10-CM

## 2024-06-12 PROCEDURE — 1159F MED LIST DOCD IN RCRD: CPT | Mod: CPTII,S$GLB,, | Performed by: ORTHOPAEDIC SURGERY

## 2024-06-12 PROCEDURE — 3008F BODY MASS INDEX DOCD: CPT | Mod: CPTII,S$GLB,, | Performed by: ORTHOPAEDIC SURGERY

## 2024-06-12 PROCEDURE — 99999 PR PBB SHADOW E&M-EST. PATIENT-LVL III: CPT | Mod: PBBFAC,,, | Performed by: ORTHOPAEDIC SURGERY

## 2024-06-12 PROCEDURE — 99214 OFFICE O/P EST MOD 30 MIN: CPT | Mod: S$GLB,,, | Performed by: ORTHOPAEDIC SURGERY

## 2024-06-13 ENCOUNTER — TELEPHONE (OUTPATIENT)
Dept: SPORTS MEDICINE | Facility: CLINIC | Age: 44
End: 2024-06-13
Payer: COMMERCIAL

## 2024-07-03 RX ORDER — POLYETHYLENE GLYCOL 3350 17 G/17G
POWDER, FOR SOLUTION ORAL
Qty: 1190 G | Refills: 11 | Status: SHIPPED | OUTPATIENT
Start: 2024-07-03

## 2024-07-03 NOTE — TELEPHONE ENCOUNTER
No care due was identified.  Health [facility] Embedded Care Due Messages. Reference number: [id number].   7/03/2024 10:07:32 AM CDT

## 2024-07-30 ENCOUNTER — OFFICE VISIT (OUTPATIENT)
Dept: SPORTS MEDICINE | Facility: CLINIC | Age: 44
End: 2024-07-30
Payer: COMMERCIAL

## 2024-07-30 ENCOUNTER — HOSPITAL ENCOUNTER (OUTPATIENT)
Dept: RADIOLOGY | Facility: HOSPITAL | Age: 44
Discharge: HOME | End: 2024-07-30
Attending: STUDENT IN AN ORGANIZED HEALTH CARE EDUCATION/TRAINING PROGRAM
Payer: COMMERCIAL

## 2024-07-30 VITALS
DIASTOLIC BLOOD PRESSURE: 64 MMHG | HEIGHT: 64 IN | SYSTOLIC BLOOD PRESSURE: 99 MMHG | HEART RATE: 67 BPM | WEIGHT: 121.25 LBS | BODY MASS INDEX: 20.7 KG/M2

## 2024-07-30 DIAGNOSIS — M87.051 AVASCULAR NECROSIS OF FEMORAL HEAD, RIGHT: ICD-10-CM

## 2024-07-30 DIAGNOSIS — M25.551 RIGHT HIP PAIN: ICD-10-CM

## 2024-07-30 DIAGNOSIS — M84.353A FEMORAL NECK STRESS FRACTURE, INITIAL ENCOUNTER: ICD-10-CM

## 2024-07-30 DIAGNOSIS — M25.551 RIGHT HIP PAIN: Primary | ICD-10-CM

## 2024-07-30 PROCEDURE — 3008F BODY MASS INDEX DOCD: CPT | Mod: CPTII,S$GLB,, | Performed by: STUDENT IN AN ORGANIZED HEALTH CARE EDUCATION/TRAINING PROGRAM

## 2024-07-30 PROCEDURE — 73502 X-RAY EXAM HIP UNI 2-3 VIEWS: CPT | Mod: 26,RT,, | Performed by: INTERNAL MEDICINE

## 2024-07-30 PROCEDURE — 99999 PR PBB SHADOW E&M-EST. PATIENT-LVL III: CPT | Mod: PBBFAC,,, | Performed by: STUDENT IN AN ORGANIZED HEALTH CARE EDUCATION/TRAINING PROGRAM

## 2024-07-30 PROCEDURE — 99214 OFFICE O/P EST MOD 30 MIN: CPT | Mod: S$GLB,,, | Performed by: STUDENT IN AN ORGANIZED HEALTH CARE EDUCATION/TRAINING PROGRAM

## 2024-07-30 PROCEDURE — 1159F MED LIST DOCD IN RCRD: CPT | Mod: CPTII,S$GLB,, | Performed by: STUDENT IN AN ORGANIZED HEALTH CARE EDUCATION/TRAINING PROGRAM

## 2024-07-30 PROCEDURE — 3078F DIAST BP <80 MM HG: CPT | Mod: CPTII,S$GLB,, | Performed by: STUDENT IN AN ORGANIZED HEALTH CARE EDUCATION/TRAINING PROGRAM

## 2024-07-30 PROCEDURE — 3074F SYST BP LT 130 MM HG: CPT | Mod: CPTII,S$GLB,, | Performed by: STUDENT IN AN ORGANIZED HEALTH CARE EDUCATION/TRAINING PROGRAM

## 2024-07-30 PROCEDURE — 1160F RVW MEDS BY RX/DR IN RCRD: CPT | Mod: CPTII,S$GLB,, | Performed by: STUDENT IN AN ORGANIZED HEALTH CARE EDUCATION/TRAINING PROGRAM

## 2024-07-30 PROCEDURE — 73502 X-RAY EXAM HIP UNI 2-3 VIEWS: CPT | Mod: TC,RT

## 2024-07-30 NOTE — PROGRESS NOTES
Subjective:          Chief Complaint: Cynthia Dailey is a 44 y.o. female who had concerns including Pain of the Right Hip.    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 She is referred by Dr. Harden.  Her pain began about 6 months ago with no known inciting injury, event, or trauma.  The right hip is significantly painful in the anterior hip and groin.  It began to bother her after running, not as much during running.  At 1 point, it reached a point where she had persistent anterior hip and groin pain for about 1 week consecutively after running at which point she was evaluated where an MRI revealed a femoral neck stress fracture.  She has a history of stress fracture of the bilateral pubic rami fractures about 2 years ago.  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  Prior to the recent onset, she had no hip pain.          Past Medical History:   Diagnosis Date    ADD (attention deficit disorder)     Anxiety     Asthma     Corns and callosities     Depression     Dislocated jaw     GERD (gastroesophageal reflux disease)     Rectal prolapse     Wears glasses        Current Outpatient Medications on File Prior to Visit   Medication Sig Dispense Refill    ATIVAN 0.5 mg tablet Take 0.5 mg by mouth 2 (two) times daily.      bremelanotide (VYLEESI) 1.75 mg/0.3 mL AtIn Inject 1.75 mg into the skin as needed (Inject SC as needed at least 45 minutes prior to anticipated sexual activity). 4 each 5    cyclobenzaprine (FLEXERIL) 10 MG tablet TAKE 1 TABLET(10 MG) BY MOUTH EVERY NIGHT 30 tablet 1    diclofenac sodium (VOLTAREN) 1 % Gel Apply 2 g topically 4 (four) times daily. 1 each 2    modafiniL (PROVIGIL) 200 MG Tab Take 200 mg by mouth.      pantoprazole (PROTONIX) 40 MG tablet Take 1 tablet (40 mg total) by mouth once daily. 90 tablet 3    polyethylene glycol (GLYCOLAX) 17  gram/dose powder MIX AND DRINK 25 GRAMS ONCE DAILY 1190 g 11    ZENZEDI 10 mg tablet Take by mouth.       No current facility-administered medications on file prior to visit.       Past Surgical History:   Procedure Laterality Date    CHOLECYSTECTOMY      COLON SURGERY  2015    Rectal Prolaps    COLONOSCOPY N/A 10/12/2017    Procedure: Colonoscopy;  Surgeon: Lucy Cortes MD;  Location: [address]);  Service: Endoscopy;  Laterality: N/A;    MANDIBLE FRACTURE SURGERY      RECTAL PROLAPSE REPAIR, RECTOPEXY         Family History   Problem Relation Name Age of Onset    Breast cancer Maternal Grandmother      Cancer Maternal Grandmother      Breast cancer Paternal Grandmother      Cancer Paternal Grandmother      Clotting disorder Neg Hx      Diabetes Neg Hx      Dislocations Neg Hx      Osteoporosis Neg Hx      Psoriasis Neg Hx      Rashes / Skin problems Neg Hx      Severe sprains Neg Hx      Ulcerative colitis Neg Hx      Celiac disease Neg Hx      Cirrhosis Neg Hx      Colon cancer Neg Hx      Colon polyps Neg Hx      Crohn's disease Neg Hx      Cystic fibrosis Neg Hx      Esophageal cancer Neg Hx      Hemochromatosis Neg Hx      Inflammatory bowel disease Neg Hx      Irritable bowel syndrome Neg Hx      Liver cancer Neg Hx      Liver disease Neg Hx      Stomach cancer Neg Hx      Rectal cancer Neg Hx      Lymphoma Neg Hx      Fausto's disease Neg Hx      Tuberculosis Neg Hx      Scleroderma Neg Hx      Multiple sclerosis Neg Hx      Melanoma Neg Hx      Lupus Neg Hx      Rheum arthritis Neg Hx         Social History     Socioeconomic History    Marital status:    Tobacco Use    Smoking status: Former     Current packs/day: 0.00     Average packs/day: 0.8 packs/day for 8.0 years (6.0 ttl pk-yrs)     Types: Cigarettes     Start date: 1998     Quit date: 2006     Years since quittin.7    Smokeless tobacco: Never   Substance and Sexual Activity    Alcohol use: Yes      Alcohol/week: 7.0 standard drinks of alcohol     Types: 7 Glasses of wine per week    Drug use: Yes     Types: Benzodiazepines, Marijuana    Sexual activity: Yes     Birth control/protection: Condom     Social Determinants of Health     Financial Resource Strain: Medium Risk (7/25/2024)    Overall Financial Resource Strain (CARDIA)     Difficulty of Paying Living Expenses: Somewhat hard   Food Insecurity: No Food Insecurity (7/25/2024)    Hunger Vital Sign     Worried About Running Out of Food in the Last Year: Never true     Ran Out of Food in the Last Year: Never true   Physical Activity: Sufficiently Active (7/25/2024)    Exercise Vital Sign     Days of Exercise per Week: 7 days     Minutes of Exercise per Session: 120 min   Stress: Stress Concern Present (7/25/2024)    [employer] [place] [employer] - Occupational Stress Questionnaire     Feeling of Stress : To some extent   Housing Stability: Unknown (7/25/2024)    Housing Stability Vital Sign     Unable to Pay for Housing in the Last Year: Patient declined      Review of Systems   Constitutional: Negative.   HENT: Negative.     Eyes: Negative.    Cardiovascular: Negative.    Respiratory: Negative.     Endocrine: Negative.    Hematologic/Lymphatic: Negative.    Skin: Negative.    Musculoskeletal:  Positive for joint pain (right hip). Negative for arthritis, falls, joint swelling, muscle cramps, muscle weakness, myalgias, neck pain and stiffness.   Neurological: Negative.    Psychiatric/Behavioral: Negative.     Allergic/Immunologic: Negative.        Pain Related Questions  Over the past 3 days, what was your average pain during activity? (I.e. running, jogging, walking, climbing stairs, getting dressed, ect.): 2  Over the past 3 days, what was your highest pain level?: 2  Over the past 3 days, what was your lowest pain level? : 1    Other  Was the patient's HEIGHT measured or patient reported?: Patient Reported  Was the patient's WEIGHT measured or  patient reported?: Measured      Objective:        General: Cynthia is well-developed, well-nourished, appears stated age, in no acute distress, alert and oriented to time, place and person.     General    Nursing note and vitals reviewed.  Constitutional: She is oriented to person, place, and time. She appears well-developed and well-nourished. No distress.   HENT:   Head: Normocephalic and atraumatic.   Nose: Nose normal.   Eyes: EOM are normal.   Cardiovascular:  Intact distal pulses.            Pulmonary/Chest: Effort normal. No respiratory distress.   Neurological: She is alert and oriented to person, place, and time.   Psychiatric: She has a normal mood and affect. Her behavior is normal. Judgment and thought content normal.           Right Knee Exam     Inspection   Alignment:  normal  Effusion: absent    Left Knee Exam     Inspection   Alignment:  normal  Effusion: absent    Right Hip Exam     Inspection   Scars: absent  Swelling: absent  Bruising: absent  No deformity of hip.  Quadriceps Atrophy:  Negative  Erythema: absent    Range of Motion   Extension:  0   Flexion:  120   External rotation:  70   Internal rotation:  30     Tests   Pain w/ forced internal rotation (ILA): absent  Pain w/ forced external rotation (FADIR): absent  Lizzie: negative  Circumduction test: negative  Stinchfield test: negative  Log Roll: negative    Other   Sensation: normal  Left Hip Exam     Inspection   Scars: absent  Swelling: absent  No deformity of hip.  Quadriceps Atrophy:  negative  Erythema: absent  Bruising: absent    Range of Motion   Extension:  0   Flexion:  120   External rotation:  70   Internal rotation: 30     Tests   Pain w/ forced internal rotation (ILA): absent  Pain w/ forced external rotation (FADIR): absent  Lizzie: negative  Trendelenburg Test: negative  Circumduction test: negative  Stinchfield test: negative  Log Roll: negative    Other   Sensation: normal          Muscle Strength   Right Lower Extremity    Hip Abduction: 5/5   Hip Adduction: 5/5   Hip Flexion: 5/5   Ankle Dorsiflexion:  5/5   Left Lower Extremity   Hip Abduction: 5/5   Hip Adduction: 5/5   Hip Flexion: 5/5   Ankle Dorsiflexion:  5/5     Vascular Exam     Right Pulses  Dorsalis Pedis:      2+  Posterior Tibial:      2+        Left Pulses  Dorsalis Pedis:      2+  Posterior Tibial:      2+        Capillary Refill  Left Hand: normal capillary refill        Edema  Right Upper Leg: absent  Left Upper Leg: absent      Imaging:   X-rays of the bilateral hips from 07/30/2024 personally viewed by me on that day these include AP pelvis, AP right hip, and bilateral modified Gibson.  There is no evidence of cam or pincer impingement.  Avascular necrosis of the bilateral femoral heads in his visualized.      MRI of the pelvis from 06/11/2024 personally viewed by me 07/30/2024.  Right femoral neck stress reaction versus fracture on the tension side, there was no dark signal on T1 imaging.  Bilateral femoral head avascular necrosis that appears minimally increased in size since prior pelvic MRI in 2022.        Assessment:     Cynthia Dailey is a 44 y.o. female with bilateral femoral head avascular necrosis and right femoral neck stress fracture.  Encounter Diagnoses   Name Primary?    Right hip pain Yes    Femoral neck stress fracture, initial encounter     Avascular necrosis of femoral head, right           Plan:       The diagnosis and treatment options were discussed at length with the patient's and all her questions were answered.  I showed her the x-rays and the MRI and reviewed the findings with her.  She has had the femoral neck stress fracture for a proximally 6-8 weeks and she is currently asymptomatic from this.  Although, she has not been running.  At this point, she can gradually increase activities include cycling and try running over the next several weeks.  She has done physical therapy in the past and will perform a home exercise program she  was given.  I did advise if she has any anterior hip or groin pain, she is to cease running and let our clinic know immediately.  Regarding the avascular necrosis, we discussed observation versus surgical intervention.  Currently, she will be a candidate for core decompression.  The procedure, risks, benefits, and rehabilitation protocol were discussed at length.  The alternative would be continued observation.  Given that her lesions appear relatively stable over the last 2 years, I think we will continue to observe it will be reasonable.      She will return to clinic in 6 weeks for repeat evaluation.

## 2024-09-10 ENCOUNTER — TELEPHONE (OUTPATIENT)
Dept: SPORTS MEDICINE | Facility: CLINIC | Age: 44
End: 2024-09-10
Payer: COMMERCIAL

## 2024-09-17 ENCOUNTER — OFFICE VISIT (OUTPATIENT)
Dept: SPORTS MEDICINE | Facility: CLINIC | Age: 44
End: 2024-09-17
Payer: COMMERCIAL

## 2024-09-17 VITALS
HEART RATE: 71 BPM | BODY MASS INDEX: 20.7 KG/M2 | DIASTOLIC BLOOD PRESSURE: 62 MMHG | HEIGHT: 64 IN | SYSTOLIC BLOOD PRESSURE: 103 MMHG | WEIGHT: 121.25 LBS

## 2024-09-17 DIAGNOSIS — M84.353A FEMORAL NECK STRESS FRACTURE, INITIAL ENCOUNTER: Primary | ICD-10-CM

## 2024-09-17 PROCEDURE — 3074F SYST BP LT 130 MM HG: CPT | Mod: CPTII,S$GLB,, | Performed by: STUDENT IN AN ORGANIZED HEALTH CARE EDUCATION/TRAINING PROGRAM

## 2024-09-17 PROCEDURE — 99999 PR PBB SHADOW E&M-EST. PATIENT-LVL III: CPT | Mod: PBBFAC,,, | Performed by: STUDENT IN AN ORGANIZED HEALTH CARE EDUCATION/TRAINING PROGRAM

## 2024-09-17 PROCEDURE — 3008F BODY MASS INDEX DOCD: CPT | Mod: CPTII,S$GLB,, | Performed by: STUDENT IN AN ORGANIZED HEALTH CARE EDUCATION/TRAINING PROGRAM

## 2024-09-17 PROCEDURE — 3078F DIAST BP <80 MM HG: CPT | Mod: CPTII,S$GLB,, | Performed by: STUDENT IN AN ORGANIZED HEALTH CARE EDUCATION/TRAINING PROGRAM

## 2024-09-17 PROCEDURE — 99213 OFFICE O/P EST LOW 20 MIN: CPT | Mod: S$GLB,,, | Performed by: STUDENT IN AN ORGANIZED HEALTH CARE EDUCATION/TRAINING PROGRAM

## 2024-09-17 NOTE — PROGRESS NOTES
Subjective:          Chief Complaint: Cynthia Dailey is a 44 y.o. female who had concerns including Pain of the Right Hip.    HPI 9/17/24:  Cynthia Dailey is a 44 y.o. female returns for follow up evaluation for her right hip pain.  Since her last visit, she notes that her pain has improved some. She denies any new injury or trauma. She complains of pain over the anterior aspect of the hip after running long distance. She has begun to return to there activity.     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 She is referred by Dr. Harden.  Her pain began about 6 months ago with no known inciting injury, event, or trauma.  The right hip is significantly painful in the anterior hip and groin.  It began to bother her after running, not as much during running.  At 1 point, it reached a point where she had persistent anterior hip and groin pain for about 1 week consecutively after running at which point she was evaluated where an MRI revealed a femoral neck stress fracture.  She has a history of stress fracture of the bilateral pubic rami fractures about 2 years ago.  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  Prior to the recent onset, she had no hip pain.          Past Medical History:   Diagnosis Date    ADD (attention deficit disorder)     Anxiety     Asthma     Corns and callosities     Depression     Dislocated jaw     GERD (gastroesophageal reflux disease)     Rectal prolapse     Wears glasses        Current Outpatient Medications on File Prior to Visit   Medication Sig Dispense Refill    ATIVAN 0.5 mg tablet Take 0.5 mg by mouth 2 (two) times daily.      bremelanotide (VYLEESI) 1.75 mg/0.3 mL AtIn Inject 1.75 mg into the skin as needed (Inject SC as needed at least 45 minutes prior to anticipated sexual activity). 4 each 5    cyclobenzaprine  (FLEXERIL) 10 MG tablet TAKE 1 TABLET(10 MG) BY MOUTH EVERY NIGHT 30 tablet 1    diclofenac sodium (VOLTAREN) 1 % Gel Apply 2 g topically 4 (four) times daily. 1 each 2    modafiniL (PROVIGIL) 200 MG Tab Take 200 mg by mouth.      pantoprazole (PROTONIX) 40 MG tablet Take 1 tablet (40 mg total) by mouth once daily. 90 tablet 3    polyethylene glycol (GLYCOLAX) 17 gram/dose powder MIX AND DRINK 25 GRAMS ONCE DAILY 1190 g 11    ZENZEDI 10 mg tablet Take by mouth.       No current facility-administered medications on file prior to visit.       Past Surgical History:   Procedure Laterality Date    CHOLECYSTECTOMY      COLON SURGERY  Jan 2015    Rectal Prolaps    COLONOSCOPY N/A 10/12/2017    Procedure: Colonoscopy;  Surgeon: Lucy Cortes MD;  Location: [address]);  Service: Endoscopy;  Laterality: N/A;    MANDIBLE FRACTURE SURGERY      RECTAL PROLAPSE REPAIR, RECTOPEXY         Family History   Problem Relation Name Age of Onset    Breast cancer Maternal Grandmother      Cancer Maternal Grandmother      Breast cancer Paternal Grandmother      Cancer Paternal Grandmother      Clotting disorder Neg Hx      Diabetes Neg Hx      Dislocations Neg Hx      Osteoporosis Neg Hx      Psoriasis Neg Hx      Rashes / Skin problems Neg Hx      Severe sprains Neg Hx      Ulcerative colitis Neg Hx      Celiac disease Neg Hx      Cirrhosis Neg Hx      Colon cancer Neg Hx      Colon polyps Neg Hx      Crohn's disease Neg Hx      Cystic fibrosis Neg Hx      Esophageal cancer Neg Hx      Hemochromatosis Neg Hx      Inflammatory bowel disease Neg Hx      Irritable bowel syndrome Neg Hx      Liver cancer Neg Hx      Liver disease Neg Hx      Stomach cancer Neg Hx      Rectal cancer Neg Hx      Lymphoma Neg Hx      Fausto's disease Neg Hx      Tuberculosis Neg Hx      Scleroderma Neg Hx      Multiple sclerosis Neg Hx      Melanoma Neg Hx      Lupus Neg Hx      Rheum arthritis Neg Hx         Social History     Socioeconomic History     Marital status:    Tobacco Use    Smoking status: Former     Current packs/day: 0.00     Average packs/day: 0.8 packs/day for 8.0 years (6.0 ttl pk-yrs)     Types: Cigarettes     Start date: 1998     Quit date: 2006     Years since quittin.8    Smokeless tobacco: Never   Substance and Sexual Activity    Alcohol use: Yes     Alcohol/week: 7.0 standard drinks of alcohol     Types: 7 Glasses of wine per week    Drug use: Yes     Types: Benzodiazepines, Marijuana    Sexual activity: Yes     Birth control/protection: Condom     Social Determinants of Health     Financial Resource Strain: Medium Risk (2024)    Overall Financial Resource Strain (CARDIA)     Difficulty of Paying Living Expenses: Somewhat hard   Food Insecurity: No Food Insecurity (2024)    Hunger Vital Sign     Worried About Running Out of Food in the Last Year: Never true     Ran Out of Food in the Last Year: Never true   Physical Activity: Sufficiently Active (2024)    Exercise Vital Sign     Days of Exercise per Week: 7 days     Minutes of Exercise per Session: 120 min   Stress: Stress Concern Present (2024)    [employer] [place] [employer] - Occupational Stress Questionnaire     Feeling of Stress : To some extent   Housing Stability: Unknown (2024)    Housing Stability Vital Sign     Unable to Pay for Housing in the Last Year: Patient declined      Review of Systems   Constitutional: Negative.   HENT: Negative.     Eyes: Negative.    Cardiovascular: Negative.    Respiratory: Negative.     Endocrine: Negative.    Hematologic/Lymphatic: Negative.    Skin: Negative.    Musculoskeletal:  Positive for joint pain (right hip). Negative for arthritis, falls, joint swelling, muscle cramps, muscle weakness, myalgias, neck pain and stiffness.   Neurological: Negative.    Psychiatric/Behavioral: Negative.     Allergic/Immunologic: Negative.        Pain Related Questions  Over the past 3 days, what was  your average pain during activity? (I.e. running, jogging, walking, climbing stairs, getting dressed, ect.): 1  Over the past 3 days, what was your highest pain level?: 2  Over the past 3 days, what was your lowest pain level? : 1    Other  How many nights a week are you awakened by your affected body part?: 0  Was the patient's HEIGHT measured or patient reported?: Measured  Was the patient's WEIGHT measured or patient reported?: Measured      Objective:        General: Cynthia is well-developed, well-nourished, appears stated age, in no acute distress, alert and oriented to time, place and person.     General    Nursing note and vitals reviewed.  Constitutional: She is oriented to person, place, and time. She appears well-developed and well-nourished. No distress.   HENT:   Head: Normocephalic and atraumatic.   Nose: Nose normal.   Eyes: EOM are normal.   Cardiovascular:  Intact distal pulses.            Pulmonary/Chest: Effort normal. No respiratory distress.   Neurological: She is alert and oriented to person, place, and time.   Psychiatric: She has a normal mood and affect. Her behavior is normal. Judgment and thought content normal.           Right Knee Exam     Inspection   Alignment:  normal  Effusion: absent    Left Knee Exam     Inspection   Alignment:  normal  Effusion: absent    Right Hip Exam     Inspection   Scars: absent  Swelling: absent  Bruising: absent  No deformity of hip.  Quadriceps Atrophy:  Negative  Erythema: absent    Range of Motion   Extension:  0   Flexion:  120   External rotation:  70   Internal rotation:  30     Tests   Pain w/ forced internal rotation (ILA): absent  Pain w/ forced external rotation (FADIR): absent  Lizzie: negative  Circumduction test: negative  Stinchfield test: negative  Log Roll: negative    Other   Sensation: normal  Left Hip Exam     Inspection   Scars: absent  Swelling: absent  No deformity of hip.  Quadriceps Atrophy:  negative  Erythema: absent  Bruising:  absent    Range of Motion   Extension:  0   Flexion:  120   External rotation:  70   Internal rotation: 30     Tests   Pain w/ forced internal rotation (ILA): absent  Pain w/ forced external rotation (FADIR): absent  Lizize: negative  Trendelenburg Test: negative  Circumduction test: negative  Stinchfield test: negative  Log Roll: negative    Other   Sensation: normal          Muscle Strength   Right Lower Extremity   Hip Abduction: 5/5   Hip Adduction: 5/5   Hip Flexion: 5/5   Ankle Dorsiflexion:  5/5   Left Lower Extremity   Hip Abduction: 5/5   Hip Adduction: 5/5   Hip Flexion: 5/5   Ankle Dorsiflexion:  5/5     Vascular Exam     Right Pulses  Dorsalis Pedis:      2+  Posterior Tibial:      2+        Left Pulses  Dorsalis Pedis:      2+  Posterior Tibial:      2+        Capillary Refill  Left Hand: normal capillary refill        Edema  Right Upper Leg: absent  Left Upper Leg: absent      Imaging:   X-rays of the bilateral hips from 07/30/2024 personally viewed by me on that day these include AP pelvis, AP right hip, and bilateral modified Gibson.  There is no evidence of cam or pincer impingement.  Avascular necrosis of the bilateral femoral heads in his visualized.      MRI of the pelvis from 06/11/2024 personally viewed by me 07/30/2024.  Right femoral neck stress reaction versus fracture on the tension side, there was no dark signal on T1 imaging.  Bilateral femoral head avascular necrosis that appears minimally increased in size since prior pelvic MRI in 2022.        Assessment:     Cynthia Dailey is a 44 y.o. female with bilateral femoral head avascular necrosis and right femoral neck stress fracture.  No diagnosis found.         Plan:       We will continue to observe her.  Return to clinic in 2-3 months with x-rays.    She will return to clinic in 6 weeks for repeat evaluation.

## 2024-09-19 ENCOUNTER — PATIENT MESSAGE (OUTPATIENT)
Dept: PRIMARY CARE CLINIC | Facility: CLINIC | Age: 44
End: 2024-09-19
Payer: COMMERCIAL

## 2024-10-31 ENCOUNTER — PATIENT MESSAGE (OUTPATIENT)
Dept: INTERNAL MEDICINE | Facility: CLINIC | Age: 44
End: 2024-10-31
Payer: COMMERCIAL

## 2024-10-31 ENCOUNTER — HOSPITAL ENCOUNTER (EMERGENCY)
Facility: HOSPITAL | Age: 44
Discharge: HOME | End: 2024-10-31
Attending: STUDENT IN AN ORGANIZED HEALTH CARE EDUCATION/TRAINING PROGRAM
Payer: COMMERCIAL

## 2024-10-31 VITALS
HEART RATE: 66 BPM | HEIGHT: 64 IN | DIASTOLIC BLOOD PRESSURE: 66 MMHG | TEMPERATURE: 97 F | SYSTOLIC BLOOD PRESSURE: 124 MMHG | WEIGHT: 125 LBS | OXYGEN SATURATION: 100 % | RESPIRATION RATE: 16 BRPM | BODY MASS INDEX: 21.34 KG/M2

## 2024-10-31 DIAGNOSIS — M47.812 CERVICAL SPONDYLOSIS: ICD-10-CM
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
## 2024-10-31 DIAGNOSIS — S80.02XA CONTUSION OF LEFT KNEE, INITIAL ENCOUNTER: ICD-10-CM

## 2024-10-31 DIAGNOSIS — S46.811A TRAPEZIUS MUSCLE STRAIN, RIGHT, INITIAL ENCOUNTER: ICD-10-CM

## 2024-10-31 DIAGNOSIS — Z23 NEED FOR TD VACCINE: ICD-10-CM

## 2024-10-31 DIAGNOSIS — G44.319 ACUTE POST-TRAUMATIC HEADACHE, NOT INTRACTABLE: ICD-10-CM

## 2024-10-31 DIAGNOSIS — M43.12 ANTEROLISTHESIS OF CERVICAL SPINE: ICD-10-CM

## 2024-10-31 DIAGNOSIS — S60.222A CONTUSION OF LEFT HAND, INITIAL ENCOUNTER: ICD-10-CM

## 2024-10-31 DIAGNOSIS — M54.2 ACUTE NECK PAIN: ICD-10-CM

## 2024-10-31 LAB
B-HCG UR QL: NEGATIVE
CTP QC/QA: YES

## 2024-10-31 PROCEDURE — 99285 EMERGENCY DEPT VISIT HI MDM: CPT | Mod: 25

## 2024-10-31 PROCEDURE — 63600175 PHARM REV CODE 636 W HCPCS: Performed by: PHYSICIAN ASSISTANT

## 2024-10-31 PROCEDURE — 25000003 PHARM REV CODE 250: Performed by: PHYSICIAN ASSISTANT

## 2024-10-31 PROCEDURE — 90715 TDAP VACCINE 7 YRS/> IM: CPT | Performed by: PHYSICIAN ASSISTANT

## 2024-10-31 PROCEDURE — 90471 IMMUNIZATION ADMIN: CPT | Performed by: PHYSICIAN ASSISTANT

## 2024-10-31 PROCEDURE — 81025 URINE PREGNANCY TEST: CPT | Performed by: PHYSICIAN ASSISTANT

## 2024-10-31 PROCEDURE — 96372 THER/PROPH/DIAG INJ SC/IM: CPT | Performed by: PHYSICIAN ASSISTANT

## 2024-10-31 RX ORDER — ORPHENADRINE CITRATE 30 MG/ML
30 INJECTION INTRAMUSCULAR; INTRAVENOUS
Status: COMPLETED | OUTPATIENT
Start: 2024-10-31 | End: 2024-10-31

## 2024-10-31 RX ORDER — OXYCODONE AND ACETAMINOPHEN 10; 325 MG/1; MG/1
1 TABLET ORAL
Status: COMPLETED | OUTPATIENT
Start: 2024-10-31 | End: 2024-10-31

## 2024-10-31 RX ORDER — IBUPROFEN 800 MG/1
800 TABLET ORAL EVERY 6 HOURS PRN
Qty: 20 TABLET | Refills: 0 | Status: SHIPPED | OUTPATIENT
Start: 2024-10-31

## 2024-10-31 RX ORDER — LIDOCAINE 50 MG/G
1 PATCH TOPICAL DAILY
Qty: 14 PATCH | Refills: 0 | Status: SHIPPED | OUTPATIENT
Start: 2024-10-31

## 2024-10-31 RX ORDER — METHOCARBAMOL 500 MG/1
500 TABLET, FILM COATED ORAL 4 TIMES DAILY
Qty: 40 TABLET | Refills: 0 | Status: SHIPPED | OUTPATIENT
Start: 2024-10-31 | End: 2024-11-10

## 2024-10-31 RX ORDER — METHOCARBAMOL 500 MG/1
500 TABLET, FILM COATED ORAL
Status: COMPLETED | OUTPATIENT
Start: 2024-10-31 | End: 2024-10-31

## 2024-10-31 RX ORDER — LIDOCAINE 50 MG/G
1 PATCH TOPICAL
Status: DISCONTINUED | OUTPATIENT
Start: 2024-10-31 | End: 2024-10-31 | Stop reason: HOSPADM

## 2024-10-31 RX ORDER — OXYCODONE HYDROCHLORIDE 5 MG/1
5 TABLET ORAL EVERY 8 HOURS PRN
Qty: 8 TABLET | Refills: 0 | Status: SHIPPED | OUTPATIENT
Start: 2024-10-31

## 2024-10-31 RX ORDER — B-COMPLEX WITH VITAMIN C
1 TABLET ORAL DAILY
COMMUNITY

## 2024-10-31 RX ADMIN — ORPHENADRINE CITRATE 30 MG: 30 INJECTION, SOLUTION INTRAMUSCULAR; INTRAVENOUS at 05:10

## 2024-10-31 RX ADMIN — METHOCARBAMOL 500 MG: 500 TABLET ORAL at 03:10

## 2024-10-31 RX ADMIN — TETANUS TOXOID, REDUCED DIPHTHERIA TOXOID AND ACELLULAR PERTUSSIS VACCINE, ADSORBED 0.5 ML: 5; 2.5; 8; 8; 2.5 SUSPENSION INTRAMUSCULAR at 03:10

## 2024-10-31 RX ADMIN — LIDOCAINE 1 PATCH: 50 PATCH CUTANEOUS at 03:10

## 2024-10-31 RX ADMIN — OXYCODONE AND ACETAMINOPHEN 1 TABLET: 10; 325 TABLET ORAL at 03:10
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
## 2024-10-31 NOTE — DISCHARGE INSTRUCTIONS
No acute fracture or dislocations noted on imaging today.  Your CT of your head is negative for acute skull fracture or brain bleed  Soft tissue swelling noted on imaging of your hand  Chronic changes noted on CT of your cervical spine including cervical spondylosis of C6-C7 and anterolisthesis C4-C5. You may follow up with spine clinic if you continue to have neck pain   I recommend that you take ibuprofen as prescribed for pain. Make sure to take with meals to prevent gastritis/stomach ulcers   You were also prescribed a muscle relaxer for neck pain. This medication is sedating. Do not operate heavy machinery, drive or drink alcohol while taking this. You make take this with ibuprofen  You were given a short course of oxycodone.  Do not take this with Ativan/benzodiazepine. This medication has a addictive and sedating properties.  Caution while taking this medication.  Do not drive or drink alcohol while taking this medication  Lidocaine patches prescribed for topical pain relief. You may use this with above medications   Practice stretches as discussed   Follow up with you spine clinic or your primary care if your pain does not improve   Strict ED precautions given to return immediately for new, worsening, or concerning symptoms

## 2024-10-31 NOTE — ED NOTES
LOC: The patient is awake and alert; oriented x 3 and speaking appropriately.  APPEARANCE: Patient resting comfortably, patient is clean and well groomed  SKIN: warm and dry, normal skin turgor & moist mucus membranes, skin intact, no breakdown noted.Abrasions on left knee , left hand  and left shoulder.   MUSCULOSKELETAL: Patient moving all extremities well, no obvious swelling or deformities noted  RESPIRATORY: Airway is open and patent,  respirations are spontaneous, normal effort and rate  CARDIAC: Patient has a normal rate, no peripheral edema noted, capillary refill < 3 seconds; No complaints of chest pain   ABDOMEN: Soft and non tender to palpation, no distention noted.
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
## 2024-11-22 DIAGNOSIS — M54.2 CERVICAL PAIN (NECK): Primary | ICD-10-CM

## 2024-11-25 ENCOUNTER — HOSPITAL ENCOUNTER (OUTPATIENT)
Dept: RADIOLOGY | Facility: HOSPITAL | Age: 44
Discharge: HOME | End: 2024-11-25
Attending: ORTHOPAEDIC SURGERY
Payer: COMMERCIAL

## 2024-11-25 ENCOUNTER — OFFICE VISIT (OUTPATIENT)
Dept: ORTHOPEDICS | Facility: CLINIC | Age: 44
End: 2024-11-25
Payer: COMMERCIAL

## 2024-11-25 VITALS — BODY MASS INDEX: 21.1 KG/M2 | WEIGHT: 123.56 LBS | HEIGHT: 64 IN

## 2024-11-25 DIAGNOSIS — M54.12 CERVICAL RADICULOPATHY: ICD-10-CM

## 2024-11-25 DIAGNOSIS — M47.812 CERVICAL SPONDYLOSIS: Primary | ICD-10-CM

## 2024-11-25 DIAGNOSIS — M54.2 CERVICAL PAIN (NECK): ICD-10-CM

## 2024-11-25 DIAGNOSIS — M50.30 DDD (DEGENERATIVE DISC DISEASE), CERVICAL: ICD-10-CM

## 2024-11-25 PROCEDURE — 72050 X-RAY EXAM NECK SPINE 4/5VWS: CPT | Mod: 26,,, | Performed by: RADIOLOGY

## 2024-11-25 PROCEDURE — 99999 PR PBB SHADOW E&M-EST. PATIENT-LVL III: CPT | Mod: PBBFAC,,, | Performed by: ORTHOPAEDIC SURGERY

## 2024-11-25 PROCEDURE — 72050 X-RAY EXAM NECK SPINE 4/5VWS: CPT | Mod: TC

## 2024-11-25 RX ORDER — GABAPENTIN 300 MG/1
300 CAPSULE ORAL 3 TIMES DAILY
Qty: 60 CAPSULE | Refills: 1 | Status: SHIPPED | OUTPATIENT
Start: 2024-11-25

## 2024-11-25 NOTE — PROGRESS NOTES
DATE: 11/26/2024  PATIENT: Cynthia Dailey    Attending Physician: Vijay Waller M.D.    CHIEF COMPLAINT: neck pain x 4 weeks     HISTORY:  Cynthia Dailey is a 44 y.o. female w/ a hx of Raynauds, dislocated jaw, and hip AVN presents for initial evaluation of neck and right arm pain (Neck - 10, Arm - 0), associated headaches and tingling/shoulder numbness. The pain has been present for 4 weeks ago. The patient describes the pain as sharp and sometimes popping. The pain is worse with movement and improved by heat at night helps a little. There is right shoulder associated numbness and tingling. There is bilateral subjective weakness in the hands. Prior treatments have included tylenol and ibuprofen, but no cervical spine surgery, Pt or JAMES, .     The Patient endorses some myelopathic symptoms such as changes in  strength and  dropping things more often than usual. Denies perineal paresthesias, bowel/bladder dysfunction.    The patient does not smoke, have DM or endorse IVDU. The patient is not on any blood thinners and does not take chronic narcotics. She works as a .    PAST MEDICAL/SURGICAL HISTORY:  Past Medical History:   Diagnosis Date    ADD (attention deficit disorder)     Anxiety     Asthma     Avascular necrosis of hip     Corns and callosities     Depression     Dislocated jaw     Dislocation of mandible     GERD (gastroesophageal reflux disease)     Jaw fracture     Raynaud disease     Rectal prolapse     Wears glasses      Past Surgical History:   Procedure Laterality Date    CHOLECYSTECTOMY      COLON SURGERY  Jan 2015    Rectal Prolaps    COLONOSCOPY N/A 10/12/2017    Procedure: Colonoscopy;  Surgeon: Lucy Cortes MD;  Location: [address]);  Service: Endoscopy;  Laterality: N/A;    MANDIBLE FRACTURE SURGERY      RECTAL PROLAPSE REPAIR, RECTOPEXY         Current Medications:   Current Outpatient Medications:     ATIVAN 0.5 mg tablet, Take 0.5 mg by mouth 2  (two) times daily., Disp: , Rfl:     B-complex with vitamin C (Z-BEC OR EQUIV) tablet, Take 1 tablet by mouth once daily., Disp: , Rfl:     bremelanotide (VYLEESI) 1.75 mg/0.3 mL AtIn, Inject 1.75 mg into the skin as needed (Inject SC as needed at least 45 minutes prior to anticipated sexual activity)., Disp: 4 each, Rfl: 5    diclofenac sodium (VOLTAREN) 1 % Gel, Apply 2 g topically 4 (four) times daily., Disp: 1 each, Rfl: 2    ibuprofen (ADVIL,MOTRIN) 800 MG tablet, Take 1 tablet (800 mg total) by mouth every 6 (six) hours as needed for Pain., Disp: 20 tablet, Rfl: 0    LIDOcaine (LIDODERM) 5 %, Place 1 patch onto the skin once daily. Remove & Discard patch within 12 hours or as directed by MD, Disp: 14 patch, Rfl: 0    modafiniL (PROVIGIL) 200 MG Tab, Take 200 mg by mouth., Disp: , Rfl:     oxyCODONE (ROXICODONE) 5 MG immediate release tablet, Take 1 tablet (5 mg total) by mouth every 8 (eight) hours as needed for Pain., Disp: 8 tablet, Rfl: 0    pantoprazole (PROTONIX) 40 MG tablet, Take 1 tablet (40 mg total) by mouth once daily., Disp: 90 tablet, Rfl: 3    polyethylene glycol (GLYCOLAX) 17 gram/dose powder, MIX AND DRINK 25 GRAMS ONCE DAILY, Disp: 1190 g, Rfl: 11    ZENZEDI 10 mg tablet, Take by mouth., Disp: , Rfl:     gabapentin (NEURONTIN) 300 MG capsule, Take 1 capsule (300 mg total) by mouth 3 (three) times daily., Disp: 60 capsule, Rfl: 1    Social History:   Social History     Socioeconomic History    Marital status:    Tobacco Use    Smoking status: Former     Current packs/day: 0.00     Average packs/day: 0.8 packs/day for 8.0 years (6.0 ttl pk-yrs)     Types: Cigarettes     Start date: 1998     Quit date: 2006     Years since quittin.0    Smokeless tobacco: Never   Substance and Sexual Activity    Alcohol use: Yes     Alcohol/week: 7.0 standard drinks of alcohol     Types: 7 Glasses of wine per week    Drug use: Yes     Types: Benzodiazepines, Marijuana    Sexual activity:  "Yes     Birth control/protection: Condom     Social Drivers of Health     Financial Resource Strain: Medium Risk (7/25/2024)    Overall Financial Resource Strain (CARDIA)     Difficulty of Paying Living Expenses: Somewhat hard   Food Insecurity: No Food Insecurity (7/25/2024)    Hunger Vital Sign     Worried About Running Out of Food in the Last Year: Never true     Ran Out of Food in the Last Year: Never true   Physical Activity: Sufficiently Active (7/25/2024)    Exercise Vital Sign     Days of Exercise per Week: 7 days     Minutes of Exercise per Session: 120 min   Stress: Stress Concern Present (7/25/2024)    [employer] - Occupational Stress Questionnaire     Feeling of Stress : To some extent   Housing Stability: Unknown (7/25/2024)    Housing Stability Vital Sign     Unable to Pay for Housing in the Last Year: Patient declined       REVIEW OF SYSTEMS:  Constitution: Negative. Negative for chills, fever and night sweats.   Cardiovascular: Negative for chest pain and syncope.   Respiratory: Negative for cough and shortness of breath.   Gastrointestinal: See HPI. Negative for nausea/vomiting. Negative for abdominal pain.  Genitourinary: See HPI. Negative for discoloration or dysuria.  Skin: Negative for dry skin, itching and rash.   Hematologic/Lymphatic: No for bleeding/clotting disorders.   Musculoskeletal: Negative for falls and muscle weakness.   Neurological: See HPI. No history of seizures. No history of cranial surgery or shunts.  Endocrine: Negative for polydipsia, polyphagia and polyuria.   Allergic/Immunologic: Negative for hives and persistent infections.  Psychiatric/Behavioral: Negative for depression and insomnia.         EXAM:  Ht 5' 4" (1.626 m)   Wt 56.1 kg (123 lb 9.1 oz)   LMP 10/10/2024   BMI 21.21 kg/m²     General: The patient is a WDWN 44 y.o. female in no apparent distress, the patient is orientatied to person, place and time.  Psych: Normal mood and " affect  HEENT: Vision grossly intact, hearing intact to the spoken word.  Lungs: Respirations unlabored.  Gait: Normal station and gait, no difficulty with toe or heel walk.   Skin: Cervical skin negative for rashes, lesions, hairy patches and surgical scars.  Range of motion: Cervical range of motion is acceptable. There is bilateral C3-4 area tenderness to palpation.  Spinal Balance: Global saggital and coronal spinal balance acceptable, no significant for scoliosis and kyphosis.  Musculoskeletal: No pain with the range of motion of the bilateral shoulders and elbows. Normal bulk and contour of the bilateral hands.  Vascular: Bilateral hands warm and well perfused, Radial pulses 2+ bilaterally.  Neurological: Normal strength and tone in all major motor groups in the bilateral upper and lower extremities. Normal sensation to light touch in the C5-T1 and L2-S1 dermatomes bilaterally.  Deep tendon reflexes symmetric 2+ in the bilateral upper and lower extremities.  Negative Inverted Radial Reflex and Melgar's bilaterally. Negative Babinski bilaterally.     IMAGING:   Today I independently reviewed the following images and my interpretations are as follows:    AP, Lat and Flex/Ex  upright C-spine films demonstrate Cervical spondylosis with DDD worst C5-7     CT scan also shows cervical spondylosis and degeneration disc disease C5-7 with grade 1 anterolisthesis C3-4 and degeneration of the right C3-4 facet joint.     Body mass index is 21.21 kg/m².  Hemoglobin A1C   Date Value Ref Range Status   01/11/2023 4.9 4.0 - 5.6 % Final     Comment:     ADA Screening Guidelines:  5.7-6.4%  Consistent with prediabetes  >or=6.5%  Consistent with diabetes    High levels of fetal hemoglobin interfere with the HbA1C  assay. Heterozygous hemoglobin variants (HbS, HgC, etc)do  not significantly interfere with this assay.   However, presence of multiple variants may affect accuracy.     02/04/2020 5.0 4.0 - 5.6 % Final     Comment:      ADA Screening Guidelines:  5.7-6.4%  Consistent with prediabetes  >or=6.5%  Consistent with diabetes  High levels of fetal hemoglobin interfere with the HbA1C  assay. Heterozygous hemoglobin variants (HbS, HgC, etc)do  not significantly interfere with this assay.   However, presence of multiple variants may affect accuracy.         ASSESSMENT/PLAN:  Cervical spondylosis  -     Ambulatory referral/consult to Physical/Occupational Therapy; Future; Expected date: 12/02/2024  -     gabapentin (NEURONTIN) 300 MG capsule; Take 1 capsule (300 mg total) by mouth 3 (three) times daily.  Dispense: 60 capsule; Refill: 1    DDD (degenerative disc disease), cervical    Cervical radiculopathy      Follow up if symptoms worsen or fail to improve.    Patient has cervical spondylosis. I discussed the natural history of their diagnoses as well as surgical and nonsurgical treatment options. I educated the patient on the importance of core/back strengthening, correct posture, bending/lifting ergonomics, and low-impact aerobic exercises (walking, elliptical, and aquatherapy). I prescribed gabapentin. Continue medications. I will refer the patient to PT for neck ROM. Patient will follow up PRN. Next step is a cervical MRI.    I have personally examined the patient and agree with the above plan.    Vijay Waller MD  Orthopaedic Spine Surgeon  Department of Orthopaedic Surgery  [phone]

## 2024-12-19 ENCOUNTER — OFFICE VISIT (OUTPATIENT)
Dept: SPORTS MEDICINE | Facility: CLINIC | Age: 44
End: 2024-12-19
Payer: COMMERCIAL

## 2024-12-19 ENCOUNTER — HOSPITAL ENCOUNTER (OUTPATIENT)
Dept: RADIOLOGY | Facility: HOSPITAL | Age: 44
Discharge: HOME | End: 2024-12-19
Attending: STUDENT IN AN ORGANIZED HEALTH CARE EDUCATION/TRAINING PROGRAM
Payer: COMMERCIAL

## 2024-12-19 VITALS
WEIGHT: 124.88 LBS | SYSTOLIC BLOOD PRESSURE: 117 MMHG | HEIGHT: 64 IN | HEART RATE: 76 BPM | BODY MASS INDEX: 21.32 KG/M2 | DIASTOLIC BLOOD PRESSURE: 71 MMHG

## 2024-12-19 DIAGNOSIS — M87.051 AVASCULAR NECROSIS OF FEMORAL HEAD, RIGHT: ICD-10-CM

## 2024-12-19 DIAGNOSIS — M84.353A FEMORAL NECK STRESS FRACTURE, INITIAL ENCOUNTER: Primary | ICD-10-CM

## 2024-12-19 DIAGNOSIS — M25.551 RIGHT HIP PAIN: ICD-10-CM

## 2024-12-19 PROCEDURE — 73502 X-RAY EXAM HIP UNI 2-3 VIEWS: CPT | Mod: TC,RT

## 2024-12-19 PROCEDURE — 3008F BODY MASS INDEX DOCD: CPT | Mod: CPTII,S$GLB,, | Performed by: STUDENT IN AN ORGANIZED HEALTH CARE EDUCATION/TRAINING PROGRAM

## 2024-12-19 PROCEDURE — 1159F MED LIST DOCD IN RCRD: CPT | Mod: CPTII,S$GLB,, | Performed by: STUDENT IN AN ORGANIZED HEALTH CARE EDUCATION/TRAINING PROGRAM

## 2024-12-19 PROCEDURE — 73502 X-RAY EXAM HIP UNI 2-3 VIEWS: CPT | Mod: 26,RT,, | Performed by: INTERNAL MEDICINE

## 2024-12-19 PROCEDURE — 99213 OFFICE O/P EST LOW 20 MIN: CPT | Mod: S$GLB,,, | Performed by: STUDENT IN AN ORGANIZED HEALTH CARE EDUCATION/TRAINING PROGRAM

## 2024-12-19 PROCEDURE — 3078F DIAST BP <80 MM HG: CPT | Mod: CPTII,S$GLB,, | Performed by: STUDENT IN AN ORGANIZED HEALTH CARE EDUCATION/TRAINING PROGRAM

## 2024-12-19 PROCEDURE — 1160F RVW MEDS BY RX/DR IN RCRD: CPT | Mod: CPTII,S$GLB,, | Performed by: STUDENT IN AN ORGANIZED HEALTH CARE EDUCATION/TRAINING PROGRAM

## 2024-12-19 PROCEDURE — 99999 PR PBB SHADOW E&M-EST. PATIENT-LVL IV: CPT | Mod: PBBFAC,,, | Performed by: STUDENT IN AN ORGANIZED HEALTH CARE EDUCATION/TRAINING PROGRAM

## 2024-12-19 PROCEDURE — 3074F SYST BP LT 130 MM HG: CPT | Mod: CPTII,S$GLB,, | Performed by: STUDENT IN AN ORGANIZED HEALTH CARE EDUCATION/TRAINING PROGRAM
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
## 2025-01-16 ENCOUNTER — PATIENT MESSAGE (OUTPATIENT)
Dept: INTERNAL MEDICINE | Facility: CLINIC | Age: 45
End: 2025-01-16
Payer: COMMERCIAL

## 2025-01-16 DIAGNOSIS — R21 RASH OF BOTH HANDS: ICD-10-CM

## 2025-01-16 DIAGNOSIS — M19.042 ARTHRITIS OF BOTH HANDS: Primary | ICD-10-CM

## 2025-01-16 DIAGNOSIS — M19.041 ARTHRITIS OF BOTH HANDS: Primary | ICD-10-CM

## 2025-01-16 NOTE — TELEPHONE ENCOUNTER
Looks like Rheumatology referral entered 2/24  Don't see ever booked.    Requesting another referral.

## 2025-02-13 DIAGNOSIS — K21.9 GASTROESOPHAGEAL REFLUX DISEASE, UNSPECIFIED WHETHER ESOPHAGITIS PRESENT: ICD-10-CM

## 2025-02-14 RX ORDER — PANTOPRAZOLE SODIUM 40 MG/1
40 TABLET, DELAYED RELEASE ORAL
Qty: 90 TABLET | Refills: 3 | Status: SHIPPED | OUTPATIENT
Start: 2025-02-14

## 2025-02-14 NOTE — TELEPHONE ENCOUNTER
No care due was identified.  [facility] Embedded Care Due Messages. Reference number: [id number].   2/13/2025 11:31:27 PM CST

## 2025-02-26 DIAGNOSIS — Z12.31 OTHER SCREENING MAMMOGRAM: ICD-10-CM

## 2025-03-15 ENCOUNTER — HOSPITAL ENCOUNTER (OUTPATIENT)
Dept: RADIOLOGY | Facility: OTHER | Age: 45
Discharge: HOME | End: 2025-03-15
Attending: FAMILY MEDICINE
Payer: COMMERCIAL

## 2025-03-15 DIAGNOSIS — Z12.31 OTHER SCREENING MAMMOGRAM: ICD-10-CM

## 2025-03-15 PROCEDURE — 77067 SCR MAMMO BI INCL CAD: CPT | Mod: 26,,, | Performed by: RADIOLOGY

## 2025-03-15 PROCEDURE — 77067 SCR MAMMO BI INCL CAD: CPT | Mod: TC

## 2025-03-15 PROCEDURE — 77063 BREAST TOMOSYNTHESIS BI: CPT | Mod: 26,,, | Performed by: RADIOLOGY

## 2025-03-18 ENCOUNTER — RESULTS FOLLOW-UP (OUTPATIENT)
Dept: INTERNAL MEDICINE | Facility: CLINIC | Age: 45
End: 2025-03-18

## 2025-03-20 ENCOUNTER — HOSPITAL ENCOUNTER (OUTPATIENT)
Dept: RADIOLOGY | Facility: HOSPITAL | Age: 45
Discharge: HOME | End: 2025-03-20
Attending: STUDENT IN AN ORGANIZED HEALTH CARE EDUCATION/TRAINING PROGRAM
Payer: COMMERCIAL

## 2025-03-20 ENCOUNTER — OFFICE VISIT (OUTPATIENT)
Dept: SPORTS MEDICINE | Facility: CLINIC | Age: 45
End: 2025-03-20
Payer: COMMERCIAL

## 2025-03-20 VITALS
HEART RATE: 67 BPM | DIASTOLIC BLOOD PRESSURE: 67 MMHG | WEIGHT: 123.81 LBS | HEIGHT: 64 IN | BODY MASS INDEX: 21.14 KG/M2 | SYSTOLIC BLOOD PRESSURE: 102 MMHG

## 2025-03-20 DIAGNOSIS — M25.552 HIP PAIN, BILATERAL: ICD-10-CM

## 2025-03-20 DIAGNOSIS — M76.892 HAMSTRING TENDINITIS OF LEFT THIGH: ICD-10-CM

## 2025-03-20 DIAGNOSIS — M25.551 HIP PAIN, BILATERAL: ICD-10-CM

## 2025-03-20 PROCEDURE — 99999 PR PBB SHADOW E&M-EST. PATIENT-LVL IV: CPT | Mod: PBBFAC,,, | Performed by: STUDENT IN AN ORGANIZED HEALTH CARE EDUCATION/TRAINING PROGRAM

## 2025-03-20 PROCEDURE — 1160F RVW MEDS BY RX/DR IN RCRD: CPT | Mod: CPTII,S$GLB,, | Performed by: STUDENT IN AN ORGANIZED HEALTH CARE EDUCATION/TRAINING PROGRAM

## 2025-03-20 PROCEDURE — 73502 X-RAY EXAM HIP UNI 2-3 VIEWS: CPT | Mod: 26,RT,, | Performed by: RADIOLOGY

## 2025-03-20 PROCEDURE — 99214 OFFICE O/P EST MOD 30 MIN: CPT | Mod: S$GLB,,, | Performed by: STUDENT IN AN ORGANIZED HEALTH CARE EDUCATION/TRAINING PROGRAM

## 2025-03-20 PROCEDURE — 73502 X-RAY EXAM HIP UNI 2-3 VIEWS: CPT | Mod: TC,RT

## 2025-03-20 PROCEDURE — 3008F BODY MASS INDEX DOCD: CPT | Mod: CPTII,S$GLB,, | Performed by: STUDENT IN AN ORGANIZED HEALTH CARE EDUCATION/TRAINING PROGRAM

## 2025-03-20 PROCEDURE — 3074F SYST BP LT 130 MM HG: CPT | Mod: CPTII,S$GLB,, | Performed by: STUDENT IN AN ORGANIZED HEALTH CARE EDUCATION/TRAINING PROGRAM

## 2025-03-20 PROCEDURE — 1159F MED LIST DOCD IN RCRD: CPT | Mod: CPTII,S$GLB,, | Performed by: STUDENT IN AN ORGANIZED HEALTH CARE EDUCATION/TRAINING PROGRAM

## 2025-03-20 PROCEDURE — 3078F DIAST BP <80 MM HG: CPT | Mod: CPTII,S$GLB,, | Performed by: STUDENT IN AN ORGANIZED HEALTH CARE EDUCATION/TRAINING PROGRAM
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
## 2025-03-25 ENCOUNTER — OFFICE VISIT (OUTPATIENT)
Dept: INTERNAL MEDICINE | Facility: CLINIC | Age: 45
End: 2025-03-25
Payer: COMMERCIAL

## 2025-03-25 ENCOUNTER — TELEPHONE (OUTPATIENT)
Dept: INTERNAL MEDICINE | Facility: CLINIC | Age: 45
End: 2025-03-25

## 2025-03-25 ENCOUNTER — LAB VISIT (OUTPATIENT)
Dept: LAB | Facility: HOSPITAL | Age: 45
End: 2025-03-25
Attending: FAMILY MEDICINE
Payer: COMMERCIAL

## 2025-03-25 VITALS
SYSTOLIC BLOOD PRESSURE: 98 MMHG | WEIGHT: 121.25 LBS | HEIGHT: 64 IN | RESPIRATION RATE: 18 BRPM | HEART RATE: 77 BPM | BODY MASS INDEX: 20.7 KG/M2 | OXYGEN SATURATION: 99 % | DIASTOLIC BLOOD PRESSURE: 72 MMHG | TEMPERATURE: 97 F

## 2025-03-25 DIAGNOSIS — K21.9 GASTROESOPHAGEAL REFLUX DISEASE, UNSPECIFIED WHETHER ESOPHAGITIS PRESENT: ICD-10-CM

## 2025-03-25 DIAGNOSIS — Z00.00 WELL ADULT EXAM: Primary | ICD-10-CM

## 2025-03-25 DIAGNOSIS — M87.051 AVASCULAR NECROSIS OF FEMORAL HEAD, RIGHT: ICD-10-CM

## 2025-03-25 DIAGNOSIS — Z00.00 WELL ADULT EXAM: ICD-10-CM

## 2025-03-25 DIAGNOSIS — G89.29 CHRONIC TMJ PAIN: ICD-10-CM

## 2025-03-25 DIAGNOSIS — G89.29 CHRONIC ELBOW PAIN, RIGHT: ICD-10-CM

## 2025-03-25 DIAGNOSIS — G44.229 CHRONIC TENSION-TYPE HEADACHE, NOT INTRACTABLE: ICD-10-CM

## 2025-03-25 DIAGNOSIS — F41.9 ANXIETY: ICD-10-CM

## 2025-03-25 DIAGNOSIS — F98.8 ATTENTION DEFICIT DISORDER, UNSPECIFIED TYPE: ICD-10-CM

## 2025-03-25 DIAGNOSIS — M26.629 CHRONIC TMJ PAIN: ICD-10-CM

## 2025-03-25 DIAGNOSIS — M25.521 CHRONIC ELBOW PAIN, RIGHT: ICD-10-CM

## 2025-03-25 LAB
ABSOLUTE EOSINOPHIL (OHS): 0.03 K/UL
ABSOLUTE MONOCYTE (OHS): 0.37 K/UL (ref 0.3–1)
ABSOLUTE NEUTROPHIL COUNT (OHS): 3.88 K/UL (ref 1.8–7.7)
ALBUMIN SERPL BCP-MCNC: 4.4 G/DL (ref 3.5–5.2)
ALP SERPL-CCNC: 63 UNIT/L (ref 40–150)
ALT SERPL W/O P-5'-P-CCNC: 23 UNIT/L (ref 10–44)
ANION GAP (OHS): 11 MMOL/L (ref 8–16)
AST SERPL-CCNC: 26 UNIT/L (ref 11–45)
BASOPHILS # BLD AUTO: 0.04 K/UL
BASOPHILS NFR BLD AUTO: 0.7 %
BILIRUB SERPL-MCNC: 0.5 MG/DL (ref 0.1–1)
BILIRUB UR QL STRIP.AUTO: NEGATIVE
BUN SERPL-MCNC: 23 MG/DL (ref 6–20)
CALCIUM SERPL-MCNC: 9.4 MG/DL (ref 8.7–10.5)
CHLORIDE SERPL-SCNC: 107 MMOL/L (ref 95–110)
CHOLEST SERPL-MCNC: 166 MG/DL (ref 120–199)
CHOLEST/HDLC SERPL: 2.4 {RATIO} (ref 2–5)
CLARITY UR: CLEAR
CO2 SERPL-SCNC: 23 MMOL/L (ref 23–29)
COLOR UR AUTO: YELLOW
CREAT SERPL-MCNC: 0.8 MG/DL (ref 0.5–1.4)
EAG (OHS): 100 MG/DL (ref 68–131)
ERYTHROCYTE [DISTWIDTH] IN BLOOD BY AUTOMATED COUNT: 13.1 % (ref 11.5–14.5)
GFR SERPLBLD CREATININE-BSD FMLA CKD-EPI: >60 ML/MIN/1.73/M2
GLUCOSE SERPL-MCNC: 74 MG/DL (ref 70–110)
GLUCOSE UR QL STRIP: NEGATIVE
HBA1C MFR BLD: 5.1 % (ref 4–5.6)
HCT VFR BLD AUTO: 44.3 % (ref 37–48.5)
HDLC SERPL-MCNC: 68 MG/DL (ref 40–75)
HDLC SERPL: 41 % (ref 20–50)
HGB BLD-MCNC: 13.8 GM/DL (ref 12–16)
HGB UR QL STRIP: NEGATIVE
IMM GRANULOCYTES # BLD AUTO: 0.01 K/UL (ref 0–0.04)
IMM GRANULOCYTES NFR BLD AUTO: 0.2 % (ref 0–0.5)
KETONES UR QL STRIP: NEGATIVE
LDLC SERPL CALC-MCNC: 87.4 MG/DL (ref 63–159)
LEUKOCYTE ESTERASE UR QL STRIP: NEGATIVE
LYMPHOCYTES # BLD AUTO: 1.79 K/UL (ref 1–4.8)
MCH RBC QN AUTO: 31.9 PG (ref 27–50)
MCHC RBC AUTO-ENTMCNC: 31.2 G/DL (ref 32–36)
MCV RBC AUTO: 103 FL (ref 82–98)
NITRITE UR QL STRIP: NEGATIVE
NONHDLC SERPL-MCNC: 98 MG/DL
NUCLEATED RBC (/100WBC) (OHS): 0 /100 WBC
PH UR STRIP: 7 [PH]
PLATELET # BLD AUTO: 264 K/UL (ref 150–450)
PMV BLD AUTO: 10.5 FL (ref 9.2–12.9)
POTASSIUM SERPL-SCNC: 4.5 MMOL/L (ref 3.5–5.1)
PROT SERPL-MCNC: 7.1 GM/DL (ref 6–8.4)
PROT UR QL STRIP: ABNORMAL
RBC # BLD AUTO: 4.32 M/UL (ref 4–5.4)
RELATIVE EOSINOPHIL (OHS): 0.5 %
RELATIVE LYMPHOCYTE (OHS): 29.2 % (ref 18–48)
RELATIVE MONOCYTE (OHS): 6 % (ref 4–15)
RELATIVE NEUTROPHIL (OHS): 63.4 % (ref 38–73)
SODIUM SERPL-SCNC: 141 MMOL/L (ref 136–145)
SP GR UR STRIP: >=1.03
T4 FREE SERPL-MCNC: 0.93 NG/DL (ref 0.71–1.51)
TRIGL SERPL-MCNC: 53 MG/DL (ref 30–150)
TSH SERPL-ACNC: 1.46 UIU/ML (ref 0.4–4)
UROBILINOGEN UR STRIP-ACNC: NEGATIVE EU/DL
WBC # BLD AUTO: 6.12 K/UL (ref 3.9–12.7)

## 2025-03-25 PROCEDURE — 82465 ASSAY BLD/SERUM CHOLESTEROL: CPT

## 2025-03-25 PROCEDURE — 84443 ASSAY THYROID STIM HORMONE: CPT

## 2025-03-25 PROCEDURE — 36415 COLL VENOUS BLD VENIPUNCTURE: CPT | Mod: PO

## 2025-03-25 PROCEDURE — 81003 URINALYSIS AUTO W/O SCOPE: CPT

## 2025-03-25 PROCEDURE — 82040 ASSAY OF SERUM ALBUMIN: CPT

## 2025-03-25 PROCEDURE — 85025 COMPLETE CBC W/AUTO DIFF WBC: CPT

## 2025-03-25 PROCEDURE — 84439 ASSAY OF FREE THYROXINE: CPT

## 2025-03-25 PROCEDURE — 99999 PR PBB SHADOW E&M-EST. PATIENT-LVL V: CPT | Mod: PBBFAC,,, | Performed by: FAMILY MEDICINE

## 2025-03-25 PROCEDURE — 83036 HEMOGLOBIN GLYCOSYLATED A1C: CPT
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
## 2025-03-26 ENCOUNTER — RESULTS FOLLOW-UP (OUTPATIENT)
Dept: INTERNAL MEDICINE | Facility: CLINIC | Age: 45
End: 2025-03-26
Payer: COMMERCIAL

## 2025-03-26 ENCOUNTER — TELEPHONE (OUTPATIENT)
Dept: INTERNAL MEDICINE | Facility: CLINIC | Age: 45
End: 2025-03-26
Payer: COMMERCIAL

## 2025-03-26 NOTE — TELEPHONE ENCOUNTER
LOV 3-25-25    Pt is requesting exercise he can do to strengthen her elbow.    Please advise thanks

## 2025-03-26 NOTE — TELEPHONE ENCOUNTER
I have forwarded the patient exercises for her elbow and have printed the exercises.  The patient can find the exercises attached to her after visit summary from yesterday's visit.  Please inform the patient.  Thank you.

## 2025-04-03 ENCOUNTER — HOSPITAL ENCOUNTER (OUTPATIENT)
Dept: RADIOLOGY | Facility: HOSPITAL | Age: 45
Discharge: HOME | End: 2025-04-03
Attending: INTERNAL MEDICINE
Payer: COMMERCIAL

## 2025-04-03 ENCOUNTER — OFFICE VISIT (OUTPATIENT)
Dept: RHEUMATOLOGY | Facility: CLINIC | Age: 45
End: 2025-04-03
Payer: COMMERCIAL

## 2025-04-03 VITALS
SYSTOLIC BLOOD PRESSURE: 103 MMHG | BODY MASS INDEX: 20.89 KG/M2 | WEIGHT: 121.69 LBS | HEART RATE: 75 BPM | DIASTOLIC BLOOD PRESSURE: 66 MMHG

## 2025-04-03 DIAGNOSIS — I73.00 RAYNAUD PHENOMENON WITHOUT GANGRENE: ICD-10-CM

## 2025-04-03 DIAGNOSIS — I73.00 RAYNAUD PHENOMENON WITHOUT GANGRENE: Primary | ICD-10-CM

## 2025-04-03 DIAGNOSIS — M87.051 AVASCULAR NECROSIS OF FEMORAL HEAD, RIGHT: ICD-10-CM

## 2025-04-03 DIAGNOSIS — M19.042 ARTHRITIS OF BOTH HANDS: ICD-10-CM

## 2025-04-03 DIAGNOSIS — M26.629 CHRONIC TMJ PAIN: ICD-10-CM

## 2025-04-03 DIAGNOSIS — G44.229 CHRONIC TENSION-TYPE HEADACHE, NOT INTRACTABLE: ICD-10-CM

## 2025-04-03 DIAGNOSIS — R21 RASH OF BOTH HANDS: ICD-10-CM

## 2025-04-03 DIAGNOSIS — M19.041 ARTHRITIS OF BOTH HANDS: ICD-10-CM

## 2025-04-03 DIAGNOSIS — G89.29 CHRONIC TMJ PAIN: ICD-10-CM

## 2025-04-03 PROCEDURE — 71046 X-RAY EXAM CHEST 2 VIEWS: CPT | Mod: TC

## 2025-04-03 PROCEDURE — 3078F DIAST BP <80 MM HG: CPT | Mod: CPTII,S$GLB,, | Performed by: INTERNAL MEDICINE

## 2025-04-03 PROCEDURE — 1159F MED LIST DOCD IN RCRD: CPT | Mod: CPTII,S$GLB,, | Performed by: INTERNAL MEDICINE

## 2025-04-03 PROCEDURE — 3008F BODY MASS INDEX DOCD: CPT | Mod: CPTII,S$GLB,, | Performed by: INTERNAL MEDICINE

## 2025-04-03 PROCEDURE — 3074F SYST BP LT 130 MM HG: CPT | Mod: CPTII,S$GLB,, | Performed by: INTERNAL MEDICINE

## 2025-04-03 PROCEDURE — 1160F RVW MEDS BY RX/DR IN RCRD: CPT | Mod: CPTII,S$GLB,, | Performed by: INTERNAL MEDICINE

## 2025-04-03 PROCEDURE — 3044F HG A1C LEVEL LT 7.0%: CPT | Mod: CPTII,S$GLB,, | Performed by: INTERNAL MEDICINE

## 2025-04-03 PROCEDURE — 99999 PR PBB SHADOW E&M-EST. PATIENT-LVL III: CPT | Mod: PBBFAC,,, | Performed by: INTERNAL MEDICINE

## 2025-04-03 PROCEDURE — 99204 OFFICE O/P NEW MOD 45 MIN: CPT | Mod: S$GLB,,, | Performed by: INTERNAL MEDICINE

## 2025-04-03 PROCEDURE — 71046 X-RAY EXAM CHEST 2 VIEWS: CPT | Mod: 26,,, | Performed by: RADIOLOGY

## 2025-04-03 RX ORDER — CYCLOBENZAPRINE HCL 10 MG
10 TABLET ORAL 2 TIMES DAILY PRN
COMMUNITY
Start: 2025-01-16

## 2025-04-06 ENCOUNTER — PATIENT MESSAGE (OUTPATIENT)
Dept: RHEUMATOLOGY | Facility: CLINIC | Age: 45
End: 2025-04-06
Payer: COMMERCIAL
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
## 2025-04-08 ENCOUNTER — RESULTS FOLLOW-UP (OUTPATIENT)
Dept: RHEUMATOLOGY | Facility: CLINIC | Age: 45
End: 2025-04-08

## 2025-05-15 ENCOUNTER — OFFICE VISIT (OUTPATIENT)
Dept: SPORTS MEDICINE | Facility: CLINIC | Age: 45
End: 2025-05-15
Payer: COMMERCIAL

## 2025-05-15 ENCOUNTER — HOSPITAL ENCOUNTER (OUTPATIENT)
Dept: RADIOLOGY | Facility: HOSPITAL | Age: 45
Discharge: HOME | End: 2025-05-15
Attending: STUDENT IN AN ORGANIZED HEALTH CARE EDUCATION/TRAINING PROGRAM
Payer: COMMERCIAL

## 2025-05-15 VITALS
SYSTOLIC BLOOD PRESSURE: 113 MMHG | HEIGHT: 64 IN | BODY MASS INDEX: 21.17 KG/M2 | DIASTOLIC BLOOD PRESSURE: 70 MMHG | WEIGHT: 124 LBS | HEART RATE: 85 BPM

## 2025-05-15 DIAGNOSIS — M87.051 AVASCULAR NECROSIS OF FEMORAL HEAD, RIGHT: ICD-10-CM

## 2025-05-15 DIAGNOSIS — M87.051 AVASCULAR NECROSIS OF FEMORAL HEAD, RIGHT: Primary | ICD-10-CM

## 2025-05-15 PROCEDURE — 1159F MED LIST DOCD IN RCRD: CPT | Mod: CPTII,S$GLB,, | Performed by: STUDENT IN AN ORGANIZED HEALTH CARE EDUCATION/TRAINING PROGRAM

## 2025-05-15 PROCEDURE — 99999 PR PBB SHADOW E&M-EST. PATIENT-LVL III: CPT | Mod: PBBFAC,,, | Performed by: STUDENT IN AN ORGANIZED HEALTH CARE EDUCATION/TRAINING PROGRAM

## 2025-05-15 PROCEDURE — 3078F DIAST BP <80 MM HG: CPT | Mod: CPTII,S$GLB,, | Performed by: STUDENT IN AN ORGANIZED HEALTH CARE EDUCATION/TRAINING PROGRAM

## 2025-05-15 PROCEDURE — 3044F HG A1C LEVEL LT 7.0%: CPT | Mod: CPTII,S$GLB,, | Performed by: STUDENT IN AN ORGANIZED HEALTH CARE EDUCATION/TRAINING PROGRAM

## 2025-05-15 PROCEDURE — 99213 OFFICE O/P EST LOW 20 MIN: CPT | Mod: S$GLB,,, | Performed by: STUDENT IN AN ORGANIZED HEALTH CARE EDUCATION/TRAINING PROGRAM

## 2025-05-15 PROCEDURE — 3074F SYST BP LT 130 MM HG: CPT | Mod: CPTII,S$GLB,, | Performed by: STUDENT IN AN ORGANIZED HEALTH CARE EDUCATION/TRAINING PROGRAM

## 2025-05-15 PROCEDURE — 1160F RVW MEDS BY RX/DR IN RCRD: CPT | Mod: CPTII,S$GLB,, | Performed by: STUDENT IN AN ORGANIZED HEALTH CARE EDUCATION/TRAINING PROGRAM

## 2025-05-15 PROCEDURE — 3008F BODY MASS INDEX DOCD: CPT | Mod: CPTII,S$GLB,, | Performed by: STUDENT IN AN ORGANIZED HEALTH CARE EDUCATION/TRAINING PROGRAM
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
## 2025-07-02 RX ORDER — POLYETHYLENE GLYCOL 3350 17 G/17G
POWDER, FOR SOLUTION ORAL
Qty: 1190 G | Refills: 11 | Status: SHIPPED | OUTPATIENT
Start: 2025-07-02

## 2025-07-02 NOTE — TELEPHONE ENCOUNTER
No care due was identified.  [facility] Embedded Care Due Messages. Reference number: [id number].   7/02/2025 4:54:49 AM CDT

## 2025-08-25 ENCOUNTER — OFFICE VISIT (OUTPATIENT)
Dept: SPORTS MEDICINE | Facility: CLINIC | Age: 45
End: 2025-08-25
Payer: COMMERCIAL

## 2025-08-25 ENCOUNTER — PATIENT MESSAGE (OUTPATIENT)
Dept: SPORTS MEDICINE | Facility: CLINIC | Age: 45
End: 2025-08-25

## 2025-08-25 ENCOUNTER — HOSPITAL ENCOUNTER (OUTPATIENT)
Dept: RADIOLOGY | Facility: HOSPITAL | Age: 45
Discharge: HOME | End: 2025-08-25
Attending: STUDENT IN AN ORGANIZED HEALTH CARE EDUCATION/TRAINING PROGRAM
Payer: COMMERCIAL

## 2025-08-25 VITALS
WEIGHT: 112.44 LBS | DIASTOLIC BLOOD PRESSURE: 76 MMHG | HEART RATE: 61 BPM | SYSTOLIC BLOOD PRESSURE: 111 MMHG | BODY MASS INDEX: 19.3 KG/M2

## 2025-08-25 DIAGNOSIS — M25.551 BILATERAL HIP PAIN: Primary | ICD-10-CM

## 2025-08-25 DIAGNOSIS — M25.552 BILATERAL HIP PAIN: ICD-10-CM

## 2025-08-25 DIAGNOSIS — R20.0 NUMBNESS AND TINGLING IN RIGHT HAND: ICD-10-CM

## 2025-08-25 DIAGNOSIS — M87.051 AVASCULAR NECROSIS OF FEMORAL HEAD, RIGHT: ICD-10-CM

## 2025-08-25 DIAGNOSIS — M25.552 BILATERAL HIP PAIN: Primary | ICD-10-CM

## 2025-08-25 DIAGNOSIS — R20.2 NUMBNESS AND TINGLING IN RIGHT HAND: ICD-10-CM

## 2025-08-25 DIAGNOSIS — M25.551 BILATERAL HIP PAIN: ICD-10-CM

## 2025-08-25 PROCEDURE — 73521 X-RAY EXAM HIPS BI 2 VIEWS: CPT | Mod: TC

## 2025-08-25 PROCEDURE — 99999 PR PBB SHADOW E&M-EST. PATIENT-LVL III: CPT | Mod: PBBFAC,,, | Performed by: STUDENT IN AN ORGANIZED HEALTH CARE EDUCATION/TRAINING PROGRAM

## 2025-08-25 PROCEDURE — 3078F DIAST BP <80 MM HG: CPT | Mod: CPTII,S$GLB,, | Performed by: STUDENT IN AN ORGANIZED HEALTH CARE EDUCATION/TRAINING PROGRAM

## 2025-08-25 PROCEDURE — 1159F MED LIST DOCD IN RCRD: CPT | Mod: CPTII,S$GLB,, | Performed by: STUDENT IN AN ORGANIZED HEALTH CARE EDUCATION/TRAINING PROGRAM

## 2025-08-25 PROCEDURE — 73521 X-RAY EXAM HIPS BI 2 VIEWS: CPT | Mod: 26,,, | Performed by: RADIOLOGY

## 2025-08-25 PROCEDURE — 1160F RVW MEDS BY RX/DR IN RCRD: CPT | Mod: CPTII,S$GLB,, | Performed by: STUDENT IN AN ORGANIZED HEALTH CARE EDUCATION/TRAINING PROGRAM

## 2025-08-25 PROCEDURE — 99213 OFFICE O/P EST LOW 20 MIN: CPT | Mod: S$GLB,,, | Performed by: STUDENT IN AN ORGANIZED HEALTH CARE EDUCATION/TRAINING PROGRAM

## 2025-08-25 PROCEDURE — 3044F HG A1C LEVEL LT 7.0%: CPT | Mod: CPTII,S$GLB,, | Performed by: STUDENT IN AN ORGANIZED HEALTH CARE EDUCATION/TRAINING PROGRAM

## 2025-08-25 PROCEDURE — 3008F BODY MASS INDEX DOCD: CPT | Mod: CPTII,S$GLB,, | Performed by: STUDENT IN AN ORGANIZED HEALTH CARE EDUCATION/TRAINING PROGRAM

## 2025-08-25 PROCEDURE — 3074F SYST BP LT 130 MM HG: CPT | Mod: CPTII,S$GLB,, | Performed by: STUDENT IN AN ORGANIZED HEALTH CARE EDUCATION/TRAINING PROGRAM
